# Patient Record
Sex: MALE | Race: OTHER | HISPANIC OR LATINO
[De-identification: names, ages, dates, MRNs, and addresses within clinical notes are randomized per-mention and may not be internally consistent; named-entity substitution may affect disease eponyms.]

---

## 2019-12-05 ENCOUNTER — TRANSCRIPTION ENCOUNTER (OUTPATIENT)
Age: 59
End: 2019-12-05

## 2019-12-16 DIAGNOSIS — Z84.1 FAMILY HISTORY OF DISORDERS OF KIDNEY AND URETER: ICD-10-CM

## 2019-12-16 DIAGNOSIS — Z82.49 FAMILY HISTORY OF ISCHEMIC HEART DISEASE AND OTHER DISEASES OF THE CIRCULATORY SYSTEM: ICD-10-CM

## 2019-12-16 DIAGNOSIS — Z83.3 FAMILY HISTORY OF DIABETES MELLITUS: ICD-10-CM

## 2019-12-24 ENCOUNTER — APPOINTMENT (OUTPATIENT)
Dept: GASTROENTEROLOGY | Facility: CLINIC | Age: 59
End: 2019-12-24
Payer: COMMERCIAL

## 2019-12-24 VITALS
HEIGHT: 65 IN | SYSTOLIC BLOOD PRESSURE: 148 MMHG | BODY MASS INDEX: 31.49 KG/M2 | WEIGHT: 189 LBS | HEART RATE: 71 BPM | DIASTOLIC BLOOD PRESSURE: 76 MMHG

## 2019-12-24 DIAGNOSIS — D50.9 IRON DEFICIENCY ANEMIA, UNSPECIFIED: ICD-10-CM

## 2019-12-24 DIAGNOSIS — I10 ESSENTIAL (PRIMARY) HYPERTENSION: ICD-10-CM

## 2019-12-24 DIAGNOSIS — Z79.4 TYPE 2 DIABETES MELLITUS W/OUT COMPLICATIONS: ICD-10-CM

## 2019-12-24 DIAGNOSIS — E11.9 TYPE 2 DIABETES MELLITUS W/OUT COMPLICATIONS: ICD-10-CM

## 2019-12-24 DIAGNOSIS — R14.3 FLATULENCE: ICD-10-CM

## 2019-12-24 DIAGNOSIS — E03.9 HYPOTHYROIDISM, UNSPECIFIED: ICD-10-CM

## 2019-12-24 DIAGNOSIS — E78.00 PURE HYPERCHOLESTEROLEMIA, UNSPECIFIED: ICD-10-CM

## 2019-12-24 PROCEDURE — 82274 ASSAY TEST FOR BLOOD FECAL: CPT | Mod: QW

## 2019-12-24 PROCEDURE — 99214 OFFICE O/P EST MOD 30 MIN: CPT

## 2019-12-24 NOTE — ASSESSMENT
[FreeTextEntry1] : 1.  Increased gas, bloating, belching and flatulence-rule out secondary to gastroparesis, GERD, gastritis, upper GI neoplasm.\par 2.  Upper endoscopy June 5, 2015 revealed intestinal metaplasia, absence of parietal cells suggesting autoimmune/atrophic chronic gastritis, focal intestinal metaplasia distal esophagus.\par 3.  Insulin-dependent diabetes mellitus with prior history of gastroparesis.\par 4.  Hypertension.\par 5.  Hypercholesterolemia.\par 6.  Hypothyroidism.\par 7.  History of iron deficiency anemia-etiology unclear.\par 8.  Status post abdominal surgery for obstructing food.\par 9.  History of colon polyp-colonoscopy June 5, 2015 revealed a tubular adenoma and internal hemorrhoids.\par \par Plan:\par 1.  The patient was advised to schedule an upper endoscopy.  The procedure, material risks, benefits and alternatives were discussed with the patient at length.  Brochure given.\par 2.  Nuclear gastric emptying study.\par 3.  A colonoscopy will be recommended for 2020, but if the patient would like to have the sooner this could be arranged.\par 4.  Investigation of the small bowel may be necessary as well.

## 2019-12-24 NOTE — REASON FOR VISIT
[Follow-Up: _____] : a [unfilled] follow-up visit [FreeTextEntry1] : Pt c/o gas and bloating x 6 months.  He tried Gas X, but only worked for a couple of days. Pt denies other GI sxs.

## 2019-12-24 NOTE — PHYSICAL EXAM
[General Appearance - Alert] : alert [General Appearance - In No Acute Distress] : in no acute distress [General Appearance - Well Nourished] : well nourished [General Appearance - Well Developed] : well developed [General Appearance - Well-Appearing] : healthy appearing [Extraocular Movements] : extraocular movements were intact [Sclera] : the sclera and conjunctiva were normal [PERRL With Normal Accommodation] : pupils were equal in size, round, and reactive to light [Strabismus] : no strabismus was seen [Outer Ear] : the ears and nose were normal in appearance [Both Tympanic Membranes Were Examined] : both tympanic membranes were normal [Examination Of The Oral Cavity] : the lips and gums were normal [Oropharynx] : the oropharynx was normal [Nasal Cavity] : the nasal mucosa and septum were normal [Neck Appearance] : the appearance of the neck was normal [Jugular Venous Distention Increased] : there was no jugular-venous distention [Neck Cervical Mass (___cm)] : no neck mass was observed [Thyroid Nodule] : there were no palpable thyroid nodules [Thyroid Diffuse Enlargement] : the thyroid was not enlarged [Auscultation Breath Sounds / Voice Sounds] : lungs were clear to auscultation bilaterally [Lungs Percussion] : the lungs were normal to percussion [Heart Rate And Rhythm] : heart rate was normal and rhythm regular [Heart Sounds] : normal S1 and S2 [Heart Sounds Gallop] : no gallops [Murmurs] : no murmurs [Heart Sounds Pericardial Friction Rub] : no pericardial rub [Arterial Pulses Carotid] : carotid pulses were normal with no bruits [Abdominal Aorta] : the abdominal aorta was normal [Full Pulse] : the pedal pulses are present [Veins - Varicosity Changes] : there were no varicosital changes [Edema] : there was no peripheral edema [Bowel Sounds] : normal bowel sounds [Abdomen Soft] : soft [Abdomen Tenderness] : non-tender [Abdomen Mass (___ Cm)] : no abdominal mass palpated [Normal Sphincter Tone] : normal sphincter tone [No Rectal Mass] : no rectal mass [FreeTextEntry1] : Stool brown, negative Hemoccult, negative iFOBT [Occult Blood Positive] : stool was negative for occult blood [Penis Abnormality] : the penis was normal [Testes Swelling] : the testicles were not swollen [Scrotum] : the scrotum was normal [Testes Mass (___cm)] : there were no testicular masses [Prostate Enlargement] : the prostate was not enlarged [Cervical Lymph Nodes Enlarged Posterior Bilaterally] : posterior cervical [Supraclavicular Lymph Nodes Enlarged Bilaterally] : supraclavicular [Cervical Lymph Nodes Enlarged Anterior Bilaterally] : anterior cervical [Axillary Lymph Nodes Enlarged Bilaterally] : axillary [Inguinal Lymph Nodes Enlarged Bilaterally] : inguinal [Femoral Lymph Nodes Enlarged Bilaterally] : femoral [No Spinal Tenderness] : no spinal tenderness [No CVA Tenderness] : no ~M costovertebral angle tenderness [Abnormal Walk] : normal gait [Nail Clubbing] : no clubbing  or cyanosis of the fingernails [Musculoskeletal - Swelling] : no joint swelling seen [Involuntary Movements] : no involuntary movements were seen [Motor Tone] : muscle strength and tone were normal [Skin Color & Pigmentation] : normal skin color and pigmentation [Skin Turgor] : normal skin turgor [] : no rash [Skin Lesions] : no skin lesions [Oriented To Time, Place, And Person] : oriented to person, place, and time [No Focal Deficits] : no focal deficits [Affect] : the affect was normal [Mood] : the mood was normal [Impaired Insight] : insight and judgment were intact

## 2019-12-24 NOTE — HISTORY OF PRESENT ILLNESS
[FreeTextEntry1] : For approximately 6 months, Redd has been complaining of excessive gas, bloating, burping and flatulence.  He takes Gas-X, and this helps him temporarily.  According to him, his diabetes is under control, and his last hemoglobin A1c = 6.8.  His endocrinologist is Dr. Ceja.  He has not been complaining of heartburn, nausea or vomiting.  Occasionally, he has diarrhea, but his bowel habits have not significantly changed, and he denies severe abdominal pain or blood in the stool.  He has not changed his medication or diet.  A year ago, he received iron infusions because of iron deficiency anemia.  According to him, his hemoglobin level is now okay.  In 2014, he had abdominal surgery because a piece of broccoli got stuck in his small intestine.

## 2019-12-24 NOTE — CONSULT LETTER
[Dear  ___] : Dear  [unfilled], [Consult Letter:] : I had the pleasure of evaluating your patient, [unfilled]. [( Thank you for referring [unfilled] for consultation for _____ )] : Thank you for referring [unfilled] for consultation for [unfilled] [Please see my note below.] : Please see my note below. [Consult Closing:] : Thank you very much for allowing me to participate in the care of this patient.  If you have any questions, please do not hesitate to contact me. [Sincerely,] : Sincerely, [FreeTextEntry3] : Hemant Vaughn MD [DrAnna  ___] : Dr. PACHECO

## 2019-12-31 ENCOUNTER — APPOINTMENT (OUTPATIENT)
Dept: GASTROENTEROLOGY | Facility: CLINIC | Age: 59
End: 2019-12-31

## 2020-01-07 ENCOUNTER — APPOINTMENT (OUTPATIENT)
Dept: GASTROENTEROLOGY | Facility: CLINIC | Age: 60
End: 2020-01-07

## 2020-02-26 ENCOUNTER — APPOINTMENT (OUTPATIENT)
Dept: GASTROENTEROLOGY | Facility: CLINIC | Age: 60
End: 2020-02-26
Payer: COMMERCIAL

## 2020-02-26 VITALS
SYSTOLIC BLOOD PRESSURE: 134 MMHG | HEART RATE: 79 BPM | DIASTOLIC BLOOD PRESSURE: 71 MMHG | BODY MASS INDEX: 31.65 KG/M2 | HEIGHT: 65 IN | WEIGHT: 190 LBS

## 2020-02-26 DIAGNOSIS — R14.2 ERUCTATION: ICD-10-CM

## 2020-02-26 PROCEDURE — 99214 OFFICE O/P EST MOD 30 MIN: CPT

## 2020-02-26 NOTE — ASSESSMENT
[FreeTextEntry1] : 1.  Nausea, vomiting, bloating, belching and flatulence.  Differential diagnosis includes gastroparesis, gastric outlet obstruction, intermittent partial bowel obstruction, gastric neoplasm.\par 2.  Autoimmune/atrophic gastritis with intestinal metaplasia seen on EGD in June 2015.\par 3.  Tubular adenoma and hemorrhoids on colonoscopy in June 2015- rule out metachronous lesion.\par 4.  Insulin-dependent diabetes mellitus with prior history of gastroparesis.\par 5.  Hypertension.\par 6.  Hypercholesterolemia.\par 7.  Hypothyroidism.\par 8.  History of iron deficiency anemia-etiology unclear.\par 9.  Status post abdominal surgery for obstructing food.\par \par Plan:\par - Patient was advised to undergo endoscopy and colonoscopy- procedure, risks, benefits, and alternatives were explained. Patient agreeable. Brochure given. Miralax prep.  Patient was advised to speak with prescribing MD about insulin dosing before the procedure.  If patient vomits with the preparation, only EGD will be performed.\par - Patient was advised to remain on liquid diet when symptomatic.\par - NM Gastric emptying study, possible CT enterography pending results of above.

## 2020-02-26 NOTE — PHYSICAL EXAM
[General Appearance - Alert] : alert [General Appearance - In No Acute Distress] : in no acute distress [Sclera] : the sclera and conjunctiva were normal [General Appearance - Well Nourished] : well nourished [PERRL With Normal Accommodation] : pupils were equal in size, round, and reactive to light [Extraocular Movements] : extraocular movements were intact [Outer Ear] : the ears and nose were normal in appearance [Hearing Threshold Finger Rub Not [place]] : hearing was normal [Both Tympanic Membranes Were Examined] : both tympanic membranes were normal [Neck Appearance] : the appearance of the neck was normal [Neck Cervical Mass (___cm)] : no neck mass was observed [Heart Rate And Rhythm] : heart rate was normal and rhythm regular [Auscultation Breath Sounds / Voice Sounds] : lungs were clear to auscultation bilaterally [Edema] : there was no peripheral edema [Heart Sounds] : normal S1 and S2 [Bowel Sounds] : normal bowel sounds [Abdomen Soft] : soft [Abdomen Tenderness] : non-tender [] : no hepato-splenomegaly [Abdomen Hernia] : no hernia was discovered [Abdomen Mass (___ Cm)] : no abdominal mass palpated [FreeTextEntry1] : umbilical scar [Supraclavicular Lymph Nodes Enlarged Bilaterally] : supraclavicular [Cervical Lymph Nodes Enlarged Anterior Bilaterally] : anterior cervical [No CVA Tenderness] : no ~M costovertebral angle tenderness [Abnormal Walk] : normal gait [Skin Color & Pigmentation] : normal skin color and pigmentation [Skin Turgor] : normal skin turgor [No Focal Deficits] : no focal deficits [Oriented To Time, Place, And Person] : oriented to person, place, and time [Impaired Insight] : insight and judgment were intact

## 2020-02-26 NOTE — HISTORY OF PRESENT ILLNESS
[Heartburn] : denies heartburn [Vomiting] : stable vomiting [Nausea] : stable nausea [Diarrhea] : denies diarrhea [Constipation] : denies constipation [Yellow Skin Or Eyes (Jaundice)] : denies jaundice [Rectal Pain] : denies rectal pain [Abdominal Pain] : stable abdominal pain [Abdominal Swelling] : abdominal swelling stable [de-identified] : Redd presents to the office today for follow up with complaints of bloating, nausea, vomiting, and diarrhea.  He was last seen by Dr. Vaughn in December 2019 for similar symptoms.\par \par For almost the past year, he has intermittent episodes of bloating and abdominal distention followed by nausea with emesis of food and nonbloody diarrhea.  The symptoms can last for about 24 hours.  It has occurred intermittently every 3-4 months.  He feels well between episodes.  He denies any dysphagia, significant abdominal pain, change in bowel habits otherwise, or weight loss.\par \par PMH: He has type 1 DM for 30 years which is well controlled.  He has been diagnosed with gastroparesis in the past.  He had intestinal surgery in 2014 for a blockage caused by broccoli.  His last EGD in June 2015 revealed granulation tissue at the pylorus.  Biopsies revealed atrophic gastritis with intestinal metaplasia and granulation tissue in the bulb.  On his colonoscopy in June 2015, a small tubular adenoma was removed from the ascending colon. [_________] : Performed [unfilled] [de-identified] : atrophic gastritis with IM, granulation tissue [de-identified] : tubular adenoma

## 2020-03-02 ENCOUNTER — LABORATORY RESULT (OUTPATIENT)
Age: 60
End: 2020-03-02

## 2020-03-03 ENCOUNTER — APPOINTMENT (OUTPATIENT)
Dept: GASTROENTEROLOGY | Facility: CLINIC | Age: 60
End: 2020-03-03
Payer: COMMERCIAL

## 2020-03-03 PROCEDURE — 43239 EGD BIOPSY SINGLE/MULTIPLE: CPT | Mod: 59

## 2020-03-03 PROCEDURE — 45380 COLONOSCOPY AND BIOPSY: CPT | Mod: 33

## 2020-07-29 ENCOUNTER — EMERGENCY (EMERGENCY)
Facility: HOSPITAL | Age: 60
LOS: 1 days | Discharge: HOME | End: 2020-07-29
Attending: EMERGENCY MEDICINE | Admitting: EMERGENCY MEDICINE
Payer: COMMERCIAL

## 2020-07-29 ENCOUNTER — APPOINTMENT (OUTPATIENT)
Dept: GASTROENTEROLOGY | Facility: CLINIC | Age: 60
End: 2020-07-29
Payer: COMMERCIAL

## 2020-07-29 VITALS
SYSTOLIC BLOOD PRESSURE: 152 MMHG | TEMPERATURE: 98 F | RESPIRATION RATE: 16 BRPM | OXYGEN SATURATION: 100 % | DIASTOLIC BLOOD PRESSURE: 55 MMHG | HEART RATE: 66 BPM

## 2020-07-29 VITALS
HEIGHT: 65 IN | HEART RATE: 66 BPM | SYSTOLIC BLOOD PRESSURE: 132 MMHG | TEMPERATURE: 97.7 F | BODY MASS INDEX: 31.65 KG/M2 | WEIGHT: 190 LBS | DIASTOLIC BLOOD PRESSURE: 71 MMHG

## 2020-07-29 VITALS
RESPIRATION RATE: 18 BRPM | DIASTOLIC BLOOD PRESSURE: 80 MMHG | HEART RATE: 65 BPM | SYSTOLIC BLOOD PRESSURE: 160 MMHG | OXYGEN SATURATION: 100 %

## 2020-07-29 DIAGNOSIS — K31.9 DISEASE OF STOMACH AND DUODENUM, UNSPECIFIED: Chronic | ICD-10-CM

## 2020-07-29 DIAGNOSIS — R11.2 NAUSEA WITH VOMITING, UNSPECIFIED: ICD-10-CM

## 2020-07-29 LAB
ALBUMIN SERPL ELPH-MCNC: 3.8 G/DL — SIGNIFICANT CHANGE UP (ref 3.3–5)
ALBUMIN SERPL ELPH-MCNC: 4.3 G/DL — SIGNIFICANT CHANGE UP (ref 3.3–5)
ALP SERPL-CCNC: 86 U/L — SIGNIFICANT CHANGE UP (ref 40–120)
ALP SERPL-CCNC: 87 U/L — SIGNIFICANT CHANGE UP (ref 40–120)
ALT FLD-CCNC: 28 U/L — SIGNIFICANT CHANGE UP (ref 4–41)
ALT FLD-CCNC: 30 U/L — SIGNIFICANT CHANGE UP (ref 4–41)
ANION GAP SERPL CALC-SCNC: 14 MMO/L — SIGNIFICANT CHANGE UP (ref 7–14)
ANION GAP SERPL CALC-SCNC: 18 MMO/L — HIGH (ref 7–14)
AST SERPL-CCNC: 16 U/L — SIGNIFICANT CHANGE UP (ref 4–40)
AST SERPL-CCNC: 27 U/L — SIGNIFICANT CHANGE UP (ref 4–40)
BASOPHILS # BLD AUTO: 0.03 K/UL — SIGNIFICANT CHANGE UP (ref 0–0.2)
BASOPHILS NFR BLD AUTO: 0.3 % — SIGNIFICANT CHANGE UP (ref 0–2)
BILIRUB SERPL-MCNC: 0.5 MG/DL — SIGNIFICANT CHANGE UP (ref 0.2–1.2)
BILIRUB SERPL-MCNC: 0.6 MG/DL — SIGNIFICANT CHANGE UP (ref 0.2–1.2)
BUN SERPL-MCNC: 25 MG/DL — HIGH (ref 7–23)
BUN SERPL-MCNC: 26 MG/DL — HIGH (ref 7–23)
CALCIUM SERPL-MCNC: 9 MG/DL — SIGNIFICANT CHANGE UP (ref 8.4–10.5)
CALCIUM SERPL-MCNC: 9.2 MG/DL — SIGNIFICANT CHANGE UP (ref 8.4–10.5)
CHLORIDE SERPL-SCNC: 101 MMOL/L — SIGNIFICANT CHANGE UP (ref 98–107)
CHLORIDE SERPL-SCNC: 104 MMOL/L — SIGNIFICANT CHANGE UP (ref 98–107)
CO2 SERPL-SCNC: 14 MMOL/L — LOW (ref 22–31)
CO2 SERPL-SCNC: 22 MMOL/L — SIGNIFICANT CHANGE UP (ref 22–31)
CREAT SERPL-MCNC: 1 MG/DL — SIGNIFICANT CHANGE UP (ref 0.5–1.3)
CREAT SERPL-MCNC: 1.08 MG/DL — SIGNIFICANT CHANGE UP (ref 0.5–1.3)
EOSINOPHIL # BLD AUTO: 0.12 K/UL — SIGNIFICANT CHANGE UP (ref 0–0.5)
EOSINOPHIL NFR BLD AUTO: 1.2 % — SIGNIFICANT CHANGE UP (ref 0–6)
GLUCOSE SERPL-MCNC: 172 MG/DL — HIGH (ref 70–99)
GLUCOSE SERPL-MCNC: 207 MG/DL — HIGH (ref 70–99)
HCT VFR BLD CALC: 46.6 % — SIGNIFICANT CHANGE UP (ref 39–50)
HGB BLD-MCNC: 14.5 G/DL — SIGNIFICANT CHANGE UP (ref 13–17)
IMM GRANULOCYTES NFR BLD AUTO: 0.9 % — SIGNIFICANT CHANGE UP (ref 0–1.5)
LIDOCAIN IGE QN: SIGNIFICANT CHANGE UP U/L (ref 7–60)
LYMPHOCYTES # BLD AUTO: 1.78 K/UL — SIGNIFICANT CHANGE UP (ref 1–3.3)
LYMPHOCYTES # BLD AUTO: 17.9 % — SIGNIFICANT CHANGE UP (ref 13–44)
MCHC RBC-ENTMCNC: 26.9 PG — LOW (ref 27–34)
MCHC RBC-ENTMCNC: 31.1 % — LOW (ref 32–36)
MCV RBC AUTO: 86.3 FL — SIGNIFICANT CHANGE UP (ref 80–100)
MONOCYTES # BLD AUTO: 1.12 K/UL — HIGH (ref 0–0.9)
MONOCYTES NFR BLD AUTO: 11.3 % — SIGNIFICANT CHANGE UP (ref 2–14)
NEUTROPHILS # BLD AUTO: 6.81 K/UL — SIGNIFICANT CHANGE UP (ref 1.8–7.4)
NEUTROPHILS NFR BLD AUTO: 68.4 % — SIGNIFICANT CHANGE UP (ref 43–77)
NRBC # FLD: 0 K/UL — SIGNIFICANT CHANGE UP (ref 0–0)
PLATELET # BLD AUTO: 277 K/UL — SIGNIFICANT CHANGE UP (ref 150–400)
PMV BLD: 10.3 FL — SIGNIFICANT CHANGE UP (ref 7–13)
POTASSIUM SERPL-MCNC: 3.7 MMOL/L — SIGNIFICANT CHANGE UP (ref 3.5–5.3)
POTASSIUM SERPL-MCNC: 5.1 MMOL/L — SIGNIFICANT CHANGE UP (ref 3.5–5.3)
POTASSIUM SERPL-SCNC: 3.7 MMOL/L — SIGNIFICANT CHANGE UP (ref 3.5–5.3)
POTASSIUM SERPL-SCNC: 5.1 MMOL/L — SIGNIFICANT CHANGE UP (ref 3.5–5.3)
PROT SERPL-MCNC: 7.1 G/DL — SIGNIFICANT CHANGE UP (ref 6–8.3)
PROT SERPL-MCNC: 7.2 G/DL — SIGNIFICANT CHANGE UP (ref 6–8.3)
RBC # BLD: 5.4 M/UL — SIGNIFICANT CHANGE UP (ref 4.2–5.8)
RBC # FLD: 13.7 % — SIGNIFICANT CHANGE UP (ref 10.3–14.5)
SODIUM SERPL-SCNC: 136 MMOL/L — SIGNIFICANT CHANGE UP (ref 135–145)
SODIUM SERPL-SCNC: 137 MMOL/L — SIGNIFICANT CHANGE UP (ref 135–145)
WBC # BLD: 9.95 K/UL — SIGNIFICANT CHANGE UP (ref 3.8–10.5)
WBC # FLD AUTO: 9.95 K/UL — SIGNIFICANT CHANGE UP (ref 3.8–10.5)

## 2020-07-29 PROCEDURE — 74177 CT ABD & PELVIS W/CONTRAST: CPT | Mod: 26

## 2020-07-29 PROCEDURE — 99284 EMERGENCY DEPT VISIT MOD MDM: CPT

## 2020-07-29 PROCEDURE — 99214 OFFICE O/P EST MOD 30 MIN: CPT

## 2020-07-29 RX ORDER — METOCLOPRAMIDE HCL 10 MG
10 TABLET ORAL ONCE
Refills: 0 | Status: COMPLETED | OUTPATIENT
Start: 2020-07-29 | End: 2020-07-29

## 2020-07-29 RX ORDER — SODIUM CHLORIDE 9 MG/ML
1000 INJECTION INTRAMUSCULAR; INTRAVENOUS; SUBCUTANEOUS ONCE
Refills: 0 | Status: COMPLETED | OUTPATIENT
Start: 2020-07-29 | End: 2020-07-29

## 2020-07-29 RX ORDER — ONDANSETRON 8 MG/1
4 TABLET, FILM COATED ORAL ONCE
Refills: 0 | Status: COMPLETED | OUTPATIENT
Start: 2020-07-29 | End: 2020-07-29

## 2020-07-29 RX ORDER — MORPHINE SULFATE 50 MG/1
4 CAPSULE, EXTENDED RELEASE ORAL ONCE
Refills: 0 | Status: DISCONTINUED | OUTPATIENT
Start: 2020-07-29 | End: 2020-07-29

## 2020-07-29 RX ADMIN — SODIUM CHLORIDE 1000 MILLILITER(S): 9 INJECTION INTRAMUSCULAR; INTRAVENOUS; SUBCUTANEOUS at 17:00

## 2020-07-29 RX ADMIN — ONDANSETRON 4 MILLIGRAM(S): 8 TABLET, FILM COATED ORAL at 19:20

## 2020-07-29 RX ADMIN — Medication 10 MILLIGRAM(S): at 16:00

## 2020-07-29 RX ADMIN — MORPHINE SULFATE 4 MILLIGRAM(S): 50 CAPSULE, EXTENDED RELEASE ORAL at 17:00

## 2020-07-29 NOTE — ED PROVIDER NOTE - PROGRESS NOTE DETAILS
Carter: Pt feeling much better. Understands need to wait for CT scan for more definitive diagnostics. Carter: Pt signed out to PA as awaiting CT scan and lab results for DC planning Pt feeling better. Abd pain has resolved. Abd soft non tender. Pt tolerating PO. Labs unremarkable. Imaging negative. Advised to follow up with his GI doctor within the next 1-2 days. If any worsening symptoms return to Emergency Department immediately. Pt verbalizes agreement and understanding. VSS.

## 2020-07-29 NOTE — ED ADULT NURSE NOTE - OBJECTIVE STATEMENT
pt with pmh of DM reports 3 days of NVD unable to keep PO intake down. saw his PMD who sent to GI. GI sent to ED for CT to r/o SBO. abd soft with cramping, denies chest pain or SOB. denies blood stool. not currently vomiting

## 2020-07-29 NOTE — REASON FOR VISIT
[Follow-Up: _____] : a [unfilled] follow-up visit [FreeTextEntry1] : Stomach cramps, vomiting, and diarrhea

## 2020-07-29 NOTE — ED PROVIDER NOTE - OBJECTIVE STATEMENT
59 y/o M hx IDDM and HTN with 3 days of abdominal pain as well as non-bloody vomiting, and diarrhea. Was seen by GI today and sent in for concern of obstruction. Denies f/c, CP, SOB, syncope, dysuria, hematuria, difficulty ambulating.     PSH: ?foreign body removal from stomach 59 y/o M hx IDDM and HTN with 3 days of abdominal pain as well as non-bloody vomiting, and diarrhea. Was seen by GI today and sent in for concern of obstruction. Denies f/c, CP, SOB, syncope, dysuria, hematuria, difficulty ambulating.     PSH: ?foreign body removal from stomach    Attendinyo male presents with vomiting and nausea.  also with epigastric pain.  no fever.

## 2020-07-29 NOTE — ED PROVIDER NOTE - NS ED ROS FT
Constitutional: (-) fever (+) vomiting  Eyes/ENT: (-) vision changes, (-) hearing changes  Cardiovascular: (-) chest pain, (-) wheezing  Respiratory: (-) cough, (-) shortness of breath  Gastrointestinal: (+) vomiting, (+) diarrhea, (+) abdominal pain  : (-) dysuria   Musculoskeletal: (-) back pain  Integumentary: (-) rash, (-) edema  Neurological: (-)loc  Allergic/Immunologic: (-) pruritus  Endocrine: No history of thyroid disease
0

## 2020-07-29 NOTE — HISTORY OF PRESENT ILLNESS
[Nausea] : stable nausea [Vomiting] : stable vomiting [Heartburn] : denies heartburn [Constipation] : denies constipation [Diarrhea] : denies diarrhea [Yellow Skin Or Eyes (Jaundice)] : denies jaundice [Abdominal Swelling] : abdominal swelling stable [Rectal Pain] : denies rectal pain [Abdominal Pain] : stable abdominal pain [_________] : Performed [unfilled] [de-identified] : atrophic gastritis with IM, ulcerated hyperplastic polyp [de-identified] : tubular adenoma [de-identified] : Redd presents to the office today for follow up with complaints of bloating, nausea, vomiting, and diarrhea.  He was last seen in the office in March 2020 when he underwent an EGD and colonoscopy.\par \par For the past year, he has intermittent episodes of bloating and abdominal distention followed by nausea with emesis of food and nonbloody diarrhea.  The symptoms seem to be occurring with increasing frequency.  The current episode started with bloating last week.  Two days ago, he started having nausea with persistent vomiting.  His voice has become hoarse from vomiting and he saw specs of blood with the emesis.  He has not been able to keep fluids down.  He went to the [facility] ER 2 days ago and reports that he was discharged on Pepcid and Maalox.  He started having diarrhea today.  He is concerned that his blood sugars may drop if he cannot keep anything down.  On his EGD in March 2020, an ulcerated hyperplastic polyp was seen in the antrum.  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  On his colonoscopy, 2 small tubular adenomas were removed and biopsies were negative for microscopic colitis.\par \par PMH: He has type 1 DM for 30 years which is well controlled.  He has been diagnosed with gastroparesis in the past.  He had intestinal surgery in 2014 for a blockage caused by broccoli.  An EGD in June 2015 revealed atrophic gastritis with intestinal metaplasia and granulation tissue in the bulb.  On his colonoscopy in June 2015, a small tubular adenoma was removed.

## 2020-07-29 NOTE — ED PROVIDER NOTE - PHYSICAL EXAMINATION
Vitals: I have reviewed the patients vital signs  General: well appearing, well nourished, no acute distress  HEENT: atraumatic, normocephalic, airway patent, EOMI and appropriate tracking  Neck: no JVD, no tracheal deviation, no goiter  Chest/Lungs: no trauma, symmetric chest rise, speaking in complete sentences  Heart: Regular rate, regular rhythm, skin well perfused  Abdomen: Obese, Soft, distended, tender in mid epigastrum and right side. No rebound or involuntary guarding. No bruising. No easily palpable masses.  Neuro: A+Ox3,ambulating without difficulty, non dysarthric speech  Eyes: PERRL, EOMI, no conjunctival injection  MSK: all limbs at baseline strength, no wasting or atrophy,   Skin: no bleeding, no cyanosis, no jaundice, no new emergent lesions

## 2020-07-29 NOTE — ED PROVIDER NOTE - NSFOLLOWUPINSTRUCTIONS_ED_ALL_ED_FT
Follow up with your primary care physician in 48-72 hours- bring copies of your results.  Return to the ER for worsening or persistent symptoms, including but not limited to worsening/persistent pain, shortness of breath, fevers, vomiting, lightheadedness, passing out and/or ANY NEW OR CONCERNING SYMPTOMS. If you have issues obtaining follow up, please call: [phone] to obtain a doctor or specialist who takes your insurance in your area.  You may call [phone] to make an appointment with the internal medicine clinic.

## 2020-07-29 NOTE — ASSESSMENT
[FreeTextEntry1] : 1.  Nausea, vomiting, bloating, belching and flatulence.  Likely secondary to gastroparesis.  Intermittent gastric outlet obstruction from hyperplastic antral polyp also possible.\par 2.  Autoimmune/atrophic gastritis with intestinal metaplasia seen on EGD in March 2020 and June 2015.\par 3.  Tubular adenoma and hemorrhoids on colonoscopy in March 2020 and June 2015.\par 4.  Status post abdominal surgery for obstructing food.\par 5.  Insulin-dependent diabetes mellitus with prior history of gastroparesis.\par 6.  Hypertension.\par 7.  Hypercholesterolemia.\par 8.  Hypothyroidism.\par 9.  History of iron deficiency anemia-etiology unclear.\par \par Plan:\par - Prior EGD reviewed.\par - Patient was advised to begin metoclopramide 10 mg ID and pantoprazole 20 mg daily.\par - Clear liquid diet until symptoms improve.\par - Check NM Gastric emptying study in 1-2 months.\par - If gastric emptying study negative, would consider repeat EGD for gastric polypectomy.\par - Patient was advised to go to ER if he is not able to tolerate liquids.  Given concern for hypoglycemia and inability to tolerate PO, patient reported that he will ask his daughter to take him to the [facility] ER after leaving the office.

## 2020-07-29 NOTE — PHYSICAL EXAM
[General Appearance - In No Acute Distress] : in no acute distress [General Appearance - Alert] : alert [General Appearance - Well Nourished] : well nourished [PERRL With Normal Accommodation] : pupils were equal in size, round, and reactive to light [Sclera] : the sclera and conjunctiva were normal [Outer Ear] : the ears and nose were normal in appearance [Hearing Threshold Finger Rub Not [place]] : hearing was normal [Extraocular Movements] : extraocular movements were intact [Both Tympanic Membranes Were Examined] : both tympanic membranes were normal [Neck Appearance] : the appearance of the neck was normal [Neck Cervical Mass (___cm)] : no neck mass was observed [Auscultation Breath Sounds / Voice Sounds] : lungs were clear to auscultation bilaterally [Edema] : there was no peripheral edema [Heart Sounds] : normal S1 and S2 [Heart Rate And Rhythm] : heart rate was normal and rhythm regular [Abdomen Soft] : soft [Bowel Sounds] : normal bowel sounds [Abdomen Tenderness] : non-tender [Abdomen Mass (___ Cm)] : no abdominal mass palpated [] : no hepato-splenomegaly [Abdomen Hernia] : no hernia was discovered [Cervical Lymph Nodes Enlarged Anterior Bilaterally] : anterior cervical [FreeTextEntry1] : umbilical scar [Abnormal Walk] : normal gait [Supraclavicular Lymph Nodes Enlarged Bilaterally] : supraclavicular [No CVA Tenderness] : no ~M costovertebral angle tenderness [No Focal Deficits] : no focal deficits [Skin Turgor] : normal skin turgor [Skin Color & Pigmentation] : normal skin color and pigmentation [Oriented To Time, Place, And Person] : oriented to person, place, and time [Impaired Insight] : insight and judgment were intact

## 2020-07-29 NOTE — ED PROVIDER NOTE - HIV OFFER
Patient ambulates to room D from triage with a chief complaint of left Great toe joint pain on-going for several weeks. Pt states another resident had a foot x-ray \"just like I had\"/states \"they missed the fracture and I think that's why I still have pain\". Patient is in no distress. Stable with ambulation.
Opt out

## 2020-07-29 NOTE — ED PROVIDER NOTE - CLINICAL SUMMARY MEDICAL DECISION MAKING FREE TEXT BOX
Abdominal pain, bloating, vomiting.  will get CT to evaluate for bowel obstruction.  treat symptomatically and reassess.

## 2020-07-29 NOTE — ED PROVIDER NOTE - PATIENT PORTAL LINK FT
You can access the FollowMyHealth Patient Portal offered by [facility] by registering at the following website: [email or web address]. By joining [employer]’s FollowMyHealth portal, you will also be able to view your health information using other applications (apps) compatible with our system.

## 2020-07-29 NOTE — REVIEW OF SYSTEMS
[Abdominal Pain] : abdominal pain [Vomiting] : vomiting [Diarrhea] : diarrhea [Negative] : Heme/Lymph [As Noted in HPI] : as noted in HPI

## 2020-08-06 PROBLEM — I10 ESSENTIAL (PRIMARY) HYPERTENSION: Chronic | Status: ACTIVE | Noted: 2020-07-29

## 2020-08-17 ENCOUNTER — APPOINTMENT (OUTPATIENT)
Dept: NUCLEAR MEDICINE | Facility: HOSPITAL | Age: 60
End: 2020-08-17
Payer: COMMERCIAL

## 2020-08-17 ENCOUNTER — RESULT REVIEW (OUTPATIENT)
Age: 60
End: 2020-08-17

## 2020-08-17 ENCOUNTER — OUTPATIENT (OUTPATIENT)
Dept: OUTPATIENT SERVICES | Facility: HOSPITAL | Age: 60
LOS: 1 days | End: 2020-08-17

## 2020-08-17 DIAGNOSIS — K31.9 DISEASE OF STOMACH AND DUODENUM, UNSPECIFIED: Chronic | ICD-10-CM

## 2020-08-17 DIAGNOSIS — R11.2 NAUSEA WITH VOMITING, UNSPECIFIED: ICD-10-CM

## 2020-08-17 PROCEDURE — 78264 GASTRIC EMPTYING IMG STUDY: CPT | Mod: 26

## 2020-12-08 ENCOUNTER — APPOINTMENT (OUTPATIENT)
Dept: GASTROENTEROLOGY | Facility: CLINIC | Age: 60
End: 2020-12-08
Payer: COMMERCIAL

## 2020-12-08 VITALS
BODY MASS INDEX: 29.16 KG/M2 | WEIGHT: 175 LBS | HEART RATE: 60 BPM | TEMPERATURE: 97.5 F | DIASTOLIC BLOOD PRESSURE: 65 MMHG | SYSTOLIC BLOOD PRESSURE: 149 MMHG | HEIGHT: 65 IN

## 2020-12-08 DIAGNOSIS — K63.89 OTHER SPECIFIED DISEASES OF INTESTINE: ICD-10-CM

## 2020-12-08 PROCEDURE — 99214 OFFICE O/P EST MOD 30 MIN: CPT

## 2020-12-08 PROCEDURE — 99072 ADDL SUPL MATRL&STAF TM PHE: CPT

## 2020-12-08 NOTE — ASSESSMENT
[FreeTextEntry1] : 1.  Episodic nausea, bloating, diarrhea.  CT Abd/Pel in July 2020 unremarkable.  NM Gastric Emptying Study in August 2020 normal.  Differential includes SIBO, diabetic enteropathy, functional GI disorder, intermittent gastric outlet obstruction from hyperplastic antral polyp.\par 2.  Autoimmune/atrophic gastritis with intestinal metaplasia seen on EGD in March 2020 and June 2015.\par 3.  Tubular adenoma and hemorrhoids on colonoscopy in March 2020 and June 2015.\par 4.  Status post abdominal surgery (small bowel) for obstructing food.\par 5.  Insulin-dependent diabetes mellitus with prior history of gastroparesis.\par 6.  Hypertension.\par 7.  Hypercholesterolemia.\par 8.  Hypothyroidism.\par 9.  History of iron deficiency anemia-etiology unclear.\par \par Plan:\par - CT images and NM Gastric emptying study reviewed.\par - Trial of Xifaxin for possible SIBO.\par - Decrease pantoprazole to 20 mg every other day.\par - Consider repeat EGD in the hospital setting late next Spring or summer for surveillance and removal of gastric polyp.

## 2020-12-08 NOTE — PHYSICAL EXAM
[General Appearance - Alert] : alert [General Appearance - In No Acute Distress] : in no acute distress [General Appearance - Well Nourished] : well nourished [Sclera] : the sclera and conjunctiva were normal [PERRL With Normal Accommodation] : pupils were equal in size, round, and reactive to light [Extraocular Movements] : extraocular movements were intact [Outer Ear] : the ears and nose were normal in appearance [Hearing Threshold Finger Rub Not [place]] : hearing was normal [Both Tympanic Membranes Were Examined] : both tympanic membranes were normal [Neck Appearance] : the appearance of the neck was normal [Neck Cervical Mass (___cm)] : no neck mass was observed [Auscultation Breath Sounds / Voice Sounds] : lungs were clear to auscultation bilaterally [Heart Rate And Rhythm] : heart rate was normal and rhythm regular [Heart Sounds] : normal S1 and S2 [Edema] : there was no peripheral edema [Bowel Sounds] : normal bowel sounds [Abdomen Soft] : soft [Abdomen Tenderness] : non-tender [] : no hepato-splenomegaly [Abdomen Mass (___ Cm)] : no abdominal mass palpated [Abdomen Hernia] : no hernia was discovered [Cervical Lymph Nodes Enlarged Anterior Bilaterally] : anterior cervical [Supraclavicular Lymph Nodes Enlarged Bilaterally] : supraclavicular [No CVA Tenderness] : no ~M costovertebral angle tenderness [Abnormal Walk] : normal gait [Skin Color & Pigmentation] : normal skin color and pigmentation [Skin Turgor] : normal skin turgor [No Focal Deficits] : no focal deficits [Oriented To Time, Place, And Person] : oriented to person, place, and time [Impaired Insight] : insight and judgment were intact [FreeTextEntry1] : umbilical scar

## 2020-12-08 NOTE — HISTORY OF PRESENT ILLNESS
[Heartburn] : denies heartburn [Nausea] : stable nausea [Vomiting] : stable vomiting [Diarrhea] : denies diarrhea [Constipation] : denies constipation [Yellow Skin Or Eyes (Jaundice)] : denies jaundice [Abdominal Pain] : stable abdominal pain [Abdominal Swelling] : abdominal swelling stable [Rectal Pain] : denies rectal pain [Test: ___] : [unfilled] [_________] : Performed [unfilled] [de-identified] : Redd presents to the office today for follow up with complaints of bloating and diarrhea.  He was last seen in the office in July 2020 for similar symptoms.\par \par For the past year, he has intermittent episodes of bloating and abdominal distention followed by nausea and nonbloody diarrhea.  Since his last visit, symptoms have occurred about twice a month.  He usually wakes up feeling bloated, takes Gas-X and starts to feel better by around 1 PM.  He currently feels well.  On the day of his last visit he went to the [facility] ER where he underwent a CT A/P which showed no acute pathology.  He then underwent a NM Gastric Emptying study in August 2020 which was normal.\par \par PMH: He has type 1 DM for 30 years which is well controlled.  He has been diagnosed with gastroparesis in the past.  He had intestinal surgery in 2014 for a blockage caused by broccoli.  An EGD in June 2015 revealed atrophic gastritis with intestinal metaplasia and granulation tissue in the bulb.  On his colonoscopy in June 2015, a small tubular adenoma was removed.  On his EGD in March 2020, an ulcerated hyperplastic polyp was seen in the antrum.  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  On his colonoscopy, 2 small tubular adenomas were removed and biopsies were negative for microscopic colitis. [de-identified] : no acute pathology [de-identified] : atrophic gastritis with IM, ulcerated hyperplastic polyp [de-identified] : tubular adenoma [de-identified] : normal

## 2020-12-31 PROBLEM — K63.89 SMALL INTESTINAL BACTERIAL OVERGROWTH: Status: ACTIVE | Noted: 2020-12-08

## 2021-02-22 ENCOUNTER — EMERGENCY (EMERGENCY)
Facility: HOSPITAL | Age: 61
LOS: 1 days | Discharge: HOME | End: 2021-02-22
Attending: EMERGENCY MEDICINE
Payer: COMMERCIAL

## 2021-02-22 ENCOUNTER — TRANSCRIPTION ENCOUNTER (OUTPATIENT)
Age: 61
End: 2021-02-22

## 2021-02-22 VITALS
OXYGEN SATURATION: 99 % | SYSTOLIC BLOOD PRESSURE: 183 MMHG | TEMPERATURE: 98 F | WEIGHT: 179.9 LBS | DIASTOLIC BLOOD PRESSURE: 78 MMHG | HEART RATE: 76 BPM | HEIGHT: 65 IN | RESPIRATION RATE: 16 BRPM

## 2021-02-22 DIAGNOSIS — K31.9 DISEASE OF STOMACH AND DUODENUM, UNSPECIFIED: Chronic | ICD-10-CM

## 2021-02-22 LAB
ALBUMIN SERPL ELPH-MCNC: 3.8 G/DL — SIGNIFICANT CHANGE UP (ref 3.3–5.2)
ALBUMIN SERPL ELPH-MCNC: 3.9 G/DL — SIGNIFICANT CHANGE UP (ref 3.3–5.2)
ALP SERPL-CCNC: 84 U/L — SIGNIFICANT CHANGE UP (ref 40–120)
ALP SERPL-CCNC: 88 U/L — SIGNIFICANT CHANGE UP (ref 40–120)
ALT FLD-CCNC: 23 U/L — SIGNIFICANT CHANGE UP
ALT FLD-CCNC: 23 U/L — SIGNIFICANT CHANGE UP
ANION GAP SERPL CALC-SCNC: 11 MMOL/L — SIGNIFICANT CHANGE UP (ref 5–17)
ANION GAP SERPL CALC-SCNC: 9 MMOL/L — SIGNIFICANT CHANGE UP (ref 5–17)
APTT BLD: 29.7 SEC — SIGNIFICANT CHANGE UP (ref 27.5–35.5)
AST SERPL-CCNC: 21 U/L — SIGNIFICANT CHANGE UP
AST SERPL-CCNC: 23 U/L — SIGNIFICANT CHANGE UP
BASOPHILS # BLD AUTO: 0.04 K/UL — SIGNIFICANT CHANGE UP (ref 0–0.2)
BASOPHILS NFR BLD AUTO: 0.5 % — SIGNIFICANT CHANGE UP (ref 0–2)
BILIRUB SERPL-MCNC: 0.2 MG/DL — LOW (ref 0.4–2)
BILIRUB SERPL-MCNC: <0.2 MG/DL — LOW (ref 0.4–2)
BUN SERPL-MCNC: 13 MG/DL — SIGNIFICANT CHANGE UP (ref 8–20)
BUN SERPL-MCNC: 14 MG/DL — SIGNIFICANT CHANGE UP (ref 8–20)
CALCIUM SERPL-MCNC: 9.1 MG/DL — SIGNIFICANT CHANGE UP (ref 8.6–10.2)
CALCIUM SERPL-MCNC: 9.1 MG/DL — SIGNIFICANT CHANGE UP (ref 8.6–10.2)
CHLORIDE SERPL-SCNC: 103 MMOL/L — SIGNIFICANT CHANGE UP (ref 98–107)
CHLORIDE SERPL-SCNC: 103 MMOL/L — SIGNIFICANT CHANGE UP (ref 98–107)
CO2 SERPL-SCNC: 26 MMOL/L — SIGNIFICANT CHANGE UP (ref 22–29)
CO2 SERPL-SCNC: 27 MMOL/L — SIGNIFICANT CHANGE UP (ref 22–29)
CREAT SERPL-MCNC: 0.89 MG/DL — SIGNIFICANT CHANGE UP (ref 0.5–1.3)
CREAT SERPL-MCNC: 1.11 MG/DL — SIGNIFICANT CHANGE UP (ref 0.5–1.3)
EOSINOPHIL # BLD AUTO: 0.11 K/UL — SIGNIFICANT CHANGE UP (ref 0–0.5)
EOSINOPHIL NFR BLD AUTO: 1.3 % — SIGNIFICANT CHANGE UP (ref 0–6)
GLUCOSE BLDC GLUCOMTR-MCNC: 100 MG/DL — HIGH (ref 70–99)
GLUCOSE SERPL-MCNC: 109 MG/DL — HIGH (ref 70–99)
GLUCOSE SERPL-MCNC: 111 MG/DL — HIGH (ref 70–99)
HCT VFR BLD CALC: 44.5 % — SIGNIFICANT CHANGE UP (ref 39–50)
HGB BLD-MCNC: 14.4 G/DL — SIGNIFICANT CHANGE UP (ref 13–17)
IMM GRANULOCYTES NFR BLD AUTO: 0.2 % — SIGNIFICANT CHANGE UP (ref 0–1.5)
INR BLD: 0.98 RATIO — SIGNIFICANT CHANGE UP (ref 0.88–1.16)
LYMPHOCYTES # BLD AUTO: 1.76 K/UL — SIGNIFICANT CHANGE UP (ref 1–3.3)
LYMPHOCYTES # BLD AUTO: 21.6 % — SIGNIFICANT CHANGE UP (ref 13–44)
MCHC RBC-ENTMCNC: 27.1 PG — SIGNIFICANT CHANGE UP (ref 27–34)
MCHC RBC-ENTMCNC: 32.4 GM/DL — SIGNIFICANT CHANGE UP (ref 32–36)
MCV RBC AUTO: 83.6 FL — SIGNIFICANT CHANGE UP (ref 80–100)
MONOCYTES # BLD AUTO: 0.91 K/UL — HIGH (ref 0–0.9)
MONOCYTES NFR BLD AUTO: 11.2 % — SIGNIFICANT CHANGE UP (ref 2–14)
NEUTROPHILS # BLD AUTO: 5.32 K/UL — SIGNIFICANT CHANGE UP (ref 1.8–7.4)
NEUTROPHILS NFR BLD AUTO: 65.2 % — SIGNIFICANT CHANGE UP (ref 43–77)
NT-PROBNP SERPL-SCNC: 52 PG/ML — SIGNIFICANT CHANGE UP (ref 0–300)
PLATELET # BLD AUTO: 263 K/UL — SIGNIFICANT CHANGE UP (ref 150–400)
POTASSIUM SERPL-MCNC: 3.6 MMOL/L — SIGNIFICANT CHANGE UP (ref 3.5–5.3)
POTASSIUM SERPL-MCNC: 4.1 MMOL/L — SIGNIFICANT CHANGE UP (ref 3.5–5.3)
POTASSIUM SERPL-SCNC: 3.6 MMOL/L — SIGNIFICANT CHANGE UP (ref 3.5–5.3)
POTASSIUM SERPL-SCNC: 4.1 MMOL/L — SIGNIFICANT CHANGE UP (ref 3.5–5.3)
PROT SERPL-MCNC: 6.8 G/DL — SIGNIFICANT CHANGE UP (ref 6.6–8.7)
PROT SERPL-MCNC: 7.2 G/DL — SIGNIFICANT CHANGE UP (ref 6.6–8.7)
PROTHROM AB SERPL-ACNC: 11.4 SEC — SIGNIFICANT CHANGE UP (ref 10.6–13.6)
RBC # BLD: 5.32 M/UL — SIGNIFICANT CHANGE UP (ref 4.2–5.8)
RBC # FLD: 13.7 % — SIGNIFICANT CHANGE UP (ref 10.3–14.5)
SODIUM SERPL-SCNC: 139 MMOL/L — SIGNIFICANT CHANGE UP (ref 135–145)
SODIUM SERPL-SCNC: 139 MMOL/L — SIGNIFICANT CHANGE UP (ref 135–145)
TROPONIN T SERPL-MCNC: <0.01 NG/ML — SIGNIFICANT CHANGE UP (ref 0–0.06)
TROPONIN T SERPL-MCNC: <0.01 NG/ML — SIGNIFICANT CHANGE UP (ref 0–0.06)
WBC # BLD: 8.16 K/UL — SIGNIFICANT CHANGE UP (ref 3.8–10.5)
WBC # FLD AUTO: 8.16 K/UL — SIGNIFICANT CHANGE UP (ref 3.8–10.5)

## 2021-02-22 PROCEDURE — 93010 ELECTROCARDIOGRAM REPORT: CPT

## 2021-02-22 PROCEDURE — 71046 X-RAY EXAM CHEST 2 VIEWS: CPT | Mod: 26

## 2021-02-22 PROCEDURE — 73030 X-RAY EXAM OF SHOULDER: CPT | Mod: 26,LT

## 2021-02-22 PROCEDURE — 99220: CPT

## 2021-02-22 RX ORDER — HYDROCHLOROTHIAZIDE 25 MG
25 TABLET ORAL DAILY
Refills: 0 | Status: DISCONTINUED | OUTPATIENT
Start: 2021-02-22 | End: 2021-02-27

## 2021-02-22 RX ORDER — DEXTROSE 50 % IN WATER 50 %
25 SYRINGE (ML) INTRAVENOUS ONCE
Refills: 0 | Status: DISCONTINUED | OUTPATIENT
Start: 2021-02-22 | End: 2021-02-27

## 2021-02-22 RX ORDER — ASPIRIN/CALCIUM CARB/MAGNESIUM 324 MG
81 TABLET ORAL DAILY
Refills: 0 | Status: DISCONTINUED | OUTPATIENT
Start: 2021-02-22 | End: 2021-02-27

## 2021-02-22 RX ORDER — INSULIN LISPRO 100/ML
10 VIAL (ML) SUBCUTANEOUS
Refills: 0 | Status: DISCONTINUED | OUTPATIENT
Start: 2021-02-22 | End: 2021-02-27

## 2021-02-22 RX ORDER — ASPIRIN/CALCIUM CARB/MAGNESIUM 324 MG
243 TABLET ORAL DAILY
Refills: 0 | Status: DISCONTINUED | OUTPATIENT
Start: 2021-02-22 | End: 2021-02-27

## 2021-02-22 RX ORDER — INSULIN GLARGINE 100 [IU]/ML
20 INJECTION, SOLUTION SUBCUTANEOUS AT BEDTIME
Refills: 0 | Status: DISCONTINUED | OUTPATIENT
Start: 2021-02-22 | End: 2021-02-27

## 2021-02-22 RX ORDER — GLUCAGON INJECTION, SOLUTION 0.5 MG/.1ML
1 INJECTION, SOLUTION SUBCUTANEOUS ONCE
Refills: 0 | Status: DISCONTINUED | OUTPATIENT
Start: 2021-02-22 | End: 2021-02-27

## 2021-02-22 RX ORDER — SIMVASTATIN 20 MG/1
40 TABLET, FILM COATED ORAL AT BEDTIME
Refills: 0 | Status: DISCONTINUED | OUTPATIENT
Start: 2021-02-22 | End: 2021-02-27

## 2021-02-22 RX ORDER — SODIUM CHLORIDE 9 MG/ML
1000 INJECTION, SOLUTION INTRAVENOUS
Refills: 0 | Status: DISCONTINUED | OUTPATIENT
Start: 2021-02-22 | End: 2021-02-27

## 2021-02-22 RX ORDER — KETOROLAC TROMETHAMINE 30 MG/ML
30 SYRINGE (ML) INJECTION ONCE
Refills: 0 | Status: DISCONTINUED | OUTPATIENT
Start: 2021-02-22 | End: 2021-02-22

## 2021-02-22 RX ORDER — ACETAMINOPHEN 500 MG
975 TABLET ORAL ONCE
Refills: 0 | Status: COMPLETED | OUTPATIENT
Start: 2021-02-22 | End: 2021-02-22

## 2021-02-22 RX ORDER — OXYCODONE HYDROCHLORIDE 5 MG/1
5 TABLET ORAL ONCE
Refills: 0 | Status: DISCONTINUED | OUTPATIENT
Start: 2021-02-22 | End: 2021-02-22

## 2021-02-22 RX ORDER — DEXTROSE 50 % IN WATER 50 %
15 SYRINGE (ML) INTRAVENOUS ONCE
Refills: 0 | Status: DISCONTINUED | OUTPATIENT
Start: 2021-02-22 | End: 2021-02-27

## 2021-02-22 RX ORDER — DEXTROSE 50 % IN WATER 50 %
12.5 SYRINGE (ML) INTRAVENOUS ONCE
Refills: 0 | Status: DISCONTINUED | OUTPATIENT
Start: 2021-02-22 | End: 2021-02-27

## 2021-02-22 RX ORDER — LEVOTHYROXINE SODIUM 125 MCG
25 TABLET ORAL DAILY
Refills: 0 | Status: DISCONTINUED | OUTPATIENT
Start: 2021-02-22 | End: 2021-02-27

## 2021-02-22 RX ORDER — LOSARTAN POTASSIUM 100 MG/1
100 TABLET, FILM COATED ORAL DAILY
Refills: 0 | Status: DISCONTINUED | OUTPATIENT
Start: 2021-02-22 | End: 2021-02-27

## 2021-02-22 RX ADMIN — Medication 975 MILLIGRAM(S): at 18:53

## 2021-02-22 RX ADMIN — OXYCODONE HYDROCHLORIDE 5 MILLIGRAM(S): 5 TABLET ORAL at 19:42

## 2021-02-22 RX ADMIN — Medication 30 MILLIGRAM(S): at 19:43

## 2021-02-22 RX ADMIN — Medication 243 MILLIGRAM(S): at 21:05

## 2021-02-22 NOTE — ED PROVIDER NOTE - ATTENDING CONTRIBUTION TO CARE
seen with resident: well appearing adult male with syncopal episode while eating; on exam, NAD, well appearing; normal conjunctiva; normal heart and lung sounds; abd soft nt nd; no pedal edema; labs ecg and imaging reviewed; plan for obs overnight for echo, tele, serial enzymes

## 2021-02-22 NOTE — ED ADULT NURSE REASSESSMENT NOTE - NS ED NURSE REASSESS COMMENT FT1
Received pt from Danya TYSON. PT c/o pain to left shoulder radiating down left arm. PT to be medicated per orders. CTM

## 2021-02-22 NOTE — ED ADULT TRIAGE NOTE - CHIEF COMPLAINT QUOTE
Pt was eating at restaurant with friend when he states the next thing he knows, he was on the floor waking up with left arm/left shoulder pain. Pt states no history of syncope and takes high BP meds.

## 2021-02-22 NOTE — ED ADULT NURSE NOTE - CAS TRG GENERAL NORM CIRC DET
Strong peripheral pulses Non-Graft Cartilage Fenestration Text: The cartilage was fenestrated with a 2mm punch biopsy to help facilitate healing.

## 2021-02-22 NOTE — ED PROVIDER NOTE - PROGRESS NOTE DETAILS
Reviewed all results with pt as well as plans for observation admission. Pt is comfortable with plan for obs stay. Questions answered. - Moreno Doe, PGY-2

## 2021-02-22 NOTE — ED CDU PROVIDER INITIAL DAY NOTE - OBJECTIVE STATEMENT
PT with SPMHX of DM, HTN, HLD presents to the ED with complaint of near syncopal episode with evening. Pt states that he went out to eat sat down started to feel light headed and fell off his chair. Pt states that he remembers just before, during and after the incident, Pt states that he never hit his head. Pt states that he has recently started to take a muscle relaxer to Tx Lt sided shoulder pain that occurred from shoveling snow 4 dyas ago. Pt states that he feels well at this time. Pt dines fever, chill, SOB, diff breathing, HA, dizziness, change in vision, confession, neck pain, back pain.

## 2021-02-22 NOTE — ED PROVIDER NOTE - PMH
DM (diabetes mellitus)    HLD (hyperlipidemia)    HTN (hypertension)    HTN (hypertension)    Hypothyroidism    IDDM (insulin dependent diabetes mellitus)

## 2021-02-22 NOTE — ED CDU PROVIDER INITIAL DAY NOTE - MEDICAL DECISION MAKING DETAILS
PT with near syncopal that was either cardiogenic vs medication related placed in obs for Diagnostic uncertainty. PT seen BY OBS PA found to be appropriate CDU PT care transferred to PA.

## 2021-02-22 NOTE — ED PROVIDER NOTE - PHYSICAL EXAMINATION
Gen: no acute distress  Head: normocephalic, atraumatic  Lung: CTAB, no respiratory distress, no wheezing, rales, rhonchi  CV: normal s1/s2, rrr,   Abd: soft, non-tender, non-distended  MSK: No edema, no visible deformities, L shoulder TTP, limited ROM to L shoulder 2/2 pain,  full range of motion in all other extremities  Neuro: No focal neurologic deficits  Skin: No rash   Psych: normal affect

## 2021-02-22 NOTE — ED PROVIDER NOTE - CLINICAL SUMMARY MEDICAL DECISION MAKING FREE TEXT BOX
Pt with hx of htn, hld, dm, presenting after syncopal episode today. Currently c/o L shoulder pain that is very reproducible. WIll check labs, ekg, trop, cxr, XR L shoulder, reeval.

## 2021-02-22 NOTE — ED CDU PROVIDER INITIAL DAY NOTE - ATTENDING CONTRIBUTION TO CARE
Pt. admitted to observation unit for near-syncope work up. Pt. stable appearing. I, Dr. Hermosillo, performed a face to face bedside interview with this patient regarding history of present illness, review of symptoms and relevant past medical, social and family history.  I completed an independent physical examination.  I have also reviewed the ACP's note(s) and discussed the plan with the ACP.

## 2021-02-23 VITALS
OXYGEN SATURATION: 100 % | HEART RATE: 63 BPM | SYSTOLIC BLOOD PRESSURE: 163 MMHG | RESPIRATION RATE: 18 BRPM | DIASTOLIC BLOOD PRESSURE: 67 MMHG

## 2021-02-23 LAB
GLUCOSE BLDC GLUCOMTR-MCNC: 140 MG/DL — HIGH (ref 70–99)
GLUCOSE BLDC GLUCOMTR-MCNC: 146 MG/DL — HIGH (ref 70–99)
GLUCOSE BLDC GLUCOMTR-MCNC: 230 MG/DL — HIGH (ref 70–99)
GLUCOSE BLDC GLUCOMTR-MCNC: 88 MG/DL — SIGNIFICANT CHANGE UP (ref 70–99)
T3 SERPL-MCNC: 103 NG/DL — SIGNIFICANT CHANGE UP (ref 80–200)
T4 AB SER-ACNC: 6.8 UG/DL — SIGNIFICANT CHANGE UP (ref 4.5–12)
TROPONIN T SERPL-MCNC: <0.01 NG/ML — SIGNIFICANT CHANGE UP (ref 0–0.06)
TROPONIN T SERPL-MCNC: <0.01 NG/ML — SIGNIFICANT CHANGE UP (ref 0–0.06)
TSH SERPL-MCNC: 3.77 UIU/ML — SIGNIFICANT CHANGE UP (ref 0.27–4.2)

## 2021-02-23 PROCEDURE — 73030 X-RAY EXAM OF SHOULDER: CPT

## 2021-02-23 PROCEDURE — 80053 COMPREHEN METABOLIC PANEL: CPT

## 2021-02-23 PROCEDURE — G1004: CPT

## 2021-02-23 PROCEDURE — 93306 TTE W/DOPPLER COMPLETE: CPT | Mod: 26

## 2021-02-23 PROCEDURE — 85730 THROMBOPLASTIN TIME PARTIAL: CPT

## 2021-02-23 PROCEDURE — 82962 GLUCOSE BLOOD TEST: CPT

## 2021-02-23 PROCEDURE — 84484 ASSAY OF TROPONIN QUANT: CPT

## 2021-02-23 PROCEDURE — G0378: CPT

## 2021-02-23 PROCEDURE — 93005 ELECTROCARDIOGRAM TRACING: CPT

## 2021-02-23 PROCEDURE — 83880 ASSAY OF NATRIURETIC PEPTIDE: CPT

## 2021-02-23 PROCEDURE — 84480 ASSAY TRIIODOTHYRONINE (T3): CPT

## 2021-02-23 PROCEDURE — 99217: CPT

## 2021-02-23 PROCEDURE — 85025 COMPLETE CBC W/AUTO DIFF WBC: CPT

## 2021-02-23 PROCEDURE — 99284 EMERGENCY DEPT VISIT MOD MDM: CPT | Mod: 25

## 2021-02-23 PROCEDURE — 96376 TX/PRO/DX INJ SAME DRUG ADON: CPT

## 2021-02-23 PROCEDURE — 71046 X-RAY EXAM CHEST 2 VIEWS: CPT

## 2021-02-23 PROCEDURE — 70450 CT HEAD/BRAIN W/O DYE: CPT | Mod: 26,MD

## 2021-02-23 PROCEDURE — 36415 COLL VENOUS BLD VENIPUNCTURE: CPT

## 2021-02-23 PROCEDURE — 84436 ASSAY OF TOTAL THYROXINE: CPT

## 2021-02-23 PROCEDURE — 85610 PROTHROMBIN TIME: CPT

## 2021-02-23 PROCEDURE — 84443 ASSAY THYROID STIM HORMONE: CPT

## 2021-02-23 PROCEDURE — 93306 TTE W/DOPPLER COMPLETE: CPT

## 2021-02-23 PROCEDURE — 96374 THER/PROPH/DIAG INJ IV PUSH: CPT | Mod: XU

## 2021-02-23 PROCEDURE — 70450 CT HEAD/BRAIN W/O DYE: CPT

## 2021-02-23 PROCEDURE — 70551 MRI BRAIN STEM W/O DYE: CPT

## 2021-02-23 PROCEDURE — 70551 MRI BRAIN STEM W/O DYE: CPT | Mod: 26,MG

## 2021-02-23 RX ORDER — KETOROLAC TROMETHAMINE 30 MG/ML
30 SYRINGE (ML) INJECTION ONCE
Refills: 0 | Status: DISCONTINUED | OUTPATIENT
Start: 2021-02-23 | End: 2021-02-23

## 2021-02-23 RX ORDER — SODIUM CHLORIDE 9 MG/ML
1000 INJECTION, SOLUTION INTRAVENOUS ONCE
Refills: 0 | Status: COMPLETED | OUTPATIENT
Start: 2021-02-23 | End: 2021-02-23

## 2021-02-23 RX ORDER — LIDOCAINE 4 G/100G
1 CREAM TOPICAL
Qty: 10 | Refills: 0
Start: 2021-02-23 | End: 2021-03-04

## 2021-02-23 RX ORDER — ONDANSETRON 8 MG/1
4 TABLET, FILM COATED ORAL ONCE
Refills: 0 | Status: COMPLETED | OUTPATIENT
Start: 2021-02-23 | End: 2021-02-23

## 2021-02-23 RX ORDER — IBUPROFEN 200 MG
1 TABLET ORAL
Qty: 28 | Refills: 0
Start: 2021-02-23 | End: 2021-03-01

## 2021-02-23 RX ORDER — MECLIZINE HCL 12.5 MG
50 TABLET ORAL EVERY 6 HOURS
Refills: 0 | Status: DISCONTINUED | OUTPATIENT
Start: 2021-02-23 | End: 2021-02-27

## 2021-02-23 RX ADMIN — SODIUM CHLORIDE 1000 MILLILITER(S): 9 INJECTION, SOLUTION INTRAVENOUS at 12:01

## 2021-02-23 RX ADMIN — Medication 50 MILLIGRAM(S): at 12:10

## 2021-02-23 RX ADMIN — Medication 30 MILLIGRAM(S): at 09:30

## 2021-02-23 RX ADMIN — LOSARTAN POTASSIUM 100 MILLIGRAM(S): 100 TABLET, FILM COATED ORAL at 05:34

## 2021-02-23 RX ADMIN — Medication 10 UNIT(S): at 12:09

## 2021-02-23 RX ADMIN — Medication 10 UNIT(S): at 08:36

## 2021-02-23 RX ADMIN — Medication 50 MILLIGRAM(S): at 05:34

## 2021-02-23 RX ADMIN — Medication 243 MILLIGRAM(S): at 12:09

## 2021-02-23 RX ADMIN — Medication 81 MILLIGRAM(S): at 12:09

## 2021-02-23 RX ADMIN — Medication 25 MILLIGRAM(S): at 05:34

## 2021-02-23 RX ADMIN — Medication 20 MILLIGRAM(S): at 12:00

## 2021-02-23 RX ADMIN — ONDANSETRON 4 MILLIGRAM(S): 8 TABLET, FILM COATED ORAL at 01:22

## 2021-02-23 RX ADMIN — Medication 25 MICROGRAM(S): at 05:34

## 2021-02-23 NOTE — CONSULT NOTE ADULT - ASSESSMENT
61yo male w/PMH HTN, HLD, IDDMII, hypothyroid. Patient presents to ED last night after a near syncopal episode. Patient states that 1/2 way through dinner he had a gradual progression of lightheadedness, followed by cold sweats, and lethargy, denies LOC/syncope, afterwards felt lingering weakness. States that for the past 2 days has had left muscular shoulder and arm pain that is reproducible w/movement and palpation that he has been taking muscle relaxers for. He denies that the pain was severe during event. While in ED last night had similar episode w/some lightheadedness followed by n/v, no arrhythmia noted on CM at that time. This morning after finishing breakfast pt had EXACT episode as PTA, on CM noted to have gradually worsening Sinus bradycardia to 33bpm w/?junctional rhythm and sinus arrest and hypotension and gradual increase back to SR and lingering weakness.  Patient follows closely w/cardiologist Dr. José Manuel Kwok ([phone]) and has routine yearly nml TTE and NSTs.  Denies exertional chest pain/pressure, palpitations, irregular and/or rapid heart beat, SOB, MUIR, orthopnea, PND, cough, edema, f/c, hematuria, or hematochezia.     Evaluated w/EP / Renan      Near Syncope  -EKG SR 73, IRBBB  -Tele noted to have gradually worsening Sinus bradycardia to 33bpm w/?junctional rhythm and sinus arrest w/associated hypotension and gradual increase back to SR  -TSH nml  -TTE EF 60-65%. GR I DD. Trace MVR  -appears to be vasovagal events.   -advised pt to stay well hydrated, make sure urine is clear yellow, currently concentrated. IVF x1 now  -educated pt that if prodrome occurs, if possible to sit down or lay down and elevate legs, if unable to he should cross legs and  hands tightening all muscles until symptoms resolve  -start Paxil 20mg PO QD, 1st dose now to prevent recurrent events  -f/u outpatient EP Dr. Urrutia in 1-2 weeks      Discussed w/Dr. Mayes. Patient verbalized understanding    Thank you for allowing me to participate in care of your patient.   Please call as needed.

## 2021-02-23 NOTE — ED ADULT NURSE REASSESSMENT NOTE - NS ED NURSE REASSESS COMMENT FT1
Pt became nauseous and vomited.  PT appears lethargic and stating hes dizzy and is going to have diarrhea. Glucose 143.  /92 HR 67.  LORA Barr at bedside to assess pt. PO Zofran given per orders and pt placed on bedpan.

## 2021-02-23 NOTE — ED CDU PROVIDER SUBSEQUENT DAY NOTE - TEMPLATE, MLM
Nutrition Assessment for:   Follow up    Assessment: Pt is 56 yo male who presented with weakness, facial drooping and slurred speech. He has history of CVA, DM type 2, HTN, pancreatitis, GERD and a smoker. Dietetic intern visited with patient. He continues with speech barriers but was able to repond to questions. He reports that he is eating >75% of meals and >75% Glucerna. He says that he does not always finish the Glucerna because he has other beverages with meals. Denies any concerns or additional needs to facilitate intake. Results for Beka Wiley (MRN [id number]) as of 1/20/2020 12:12   Ref. Range 1/17/2020 21:01 1/18/2020 06:59 1/18/2020 10:22 1/19/2020 07:33 1/20/2020 07:56   GLUCOSE,FAST - POC Latest Ref Range: 65 - 100 mg/dL 83 115 (H) 97 83 125 (H)     DIET NUTRITIONAL SUPPLEMENTS All Meals; Glucerna Shake  DIET DIABETIC CONSISTENT CARB Mechanical Soft      Anthropometrics:  Height: 5' 6\" (167.6 cm), Weight: 95.3 kg (210 lb), source not specified, Body mass index is 33.89 kg/m². BMI class of obese. Macronutrient needs: MSJ (using CBW (Current body weight) unknown 95.3 kg)  EER: 2130 kcal/day (22 kcals/kg )  EPR: 106 g/day (1.1 g/kg ) (20%)    Intake/Comparative Standards: Per patient, consuming >75% most meals. This meets % of kcal needs and % of protein needs. Nutrition Intervention:  Meals and snacks: Continue current diet. Progressions per SLP. Medical food supplement therapy: Continue Glucerna TID (Kelly Cruz). Blood glucose remains appropriate for continued use. Discharge Nutrition Plan: With improving PO intake, frequency of Glucerna may be reduced or discontinued at discharge.     Jos Osorio, Dietetic Intern Abdominal Pain, N/V/D

## 2021-02-23 NOTE — ED CDU PROVIDER SUBSEQUENT DAY NOTE - HISTORY
PT placed in OBS, has had no acute incidents or complaints while in CDU PT is stable. PT Placed in OBS for evaluation of recurrent near syncope events were pt becomes light headed nausea, Pt awaiting MR, ECHO, continued Antivert, revaluations.

## 2021-02-23 NOTE — ED CDU PROVIDER DISPOSITION NOTE - CLINICAL COURSE
60 year old male with PMH HTn, HLD, NIDDM presents with syncope. Pt states that he was eating dinner, when he had a prodrome of dizziness, lightheadedness and then proceeded to syncopize. Pt was placed on Obs. Overnight he had an episode of dizziness, and an MRi was ordered. However, the pt did not have any true vertiginous dizziness, and was more a prodromal lightheadedness. MRI negative. While here, again while eating, the pt started to feel lightheaded, became sinus missael to 30s, diaphoretic, bop initially dropped to 80s/50s, then quickly resolved and he is asymptomatic currently. Cardio michelle pinoueve this to be vagal in nature. Advised starting Paxil, aggressively hydrating and f/u with Dr. Urrutia in the office.

## 2021-02-23 NOTE — ED ADULT NURSE REASSESSMENT NOTE - NS ED NURSE REASSESS COMMENT FT1
pt. returns from MRI. pt. a&ox3. pt. returns from MRI. pt. a&ox3. pt. denies pain or discomfort at this time. pt. in no apparent distress at this time, breathing even and unlabored. pt. informed on plan of care.

## 2021-02-23 NOTE — CONSULT NOTE ADULT - SUBJECTIVE AND OBJECTIVE BOX
[facility]                                                               Office:  [address] [address]                                                              Telephone: [phone]. Fax:[phone]                                                                        CARDIOLOGY CONSULTATION NOTE                                                                                             Consult requested by:  Dr. Mayes  Reason for Consultation: near syncope, symptomatic bradycardia  History obtained by: Patient and medical record   obtained: No    Chief complaint:    Patient is a 60y old  Male who presents with a chief complaint of lightheadedness, weakness, diaphoresis.    HPI:  61yo male w/PMH HTN, HLD, IDDMII, hypothyroid. Patient presents to ED last night after a near syncopal episode. Patient states that 1/2 way through dinner he had a gradual progression of lightheadedness, followed by cold sweats, and lethargy, denies LOC/syncope, afterwards felt lingering weakness. States that for the past 2 days has had left muscular shoulder and arm pain that is reproducible w/movement and palpation that he has been taking muscle relaxers for. He denies that the pain was severe during event. While in ED last night had similar episode w/some lightheadedness followed by n/v, no arrhythmia noted on CM at that time. This morning after finishing breakfast pt had EXACT episode as PTA, on CM noted to have gradually worsening Sinus bradycardia to 33bpm w/?junctional rhythm and sinus arrest and hypotension and gradual increase back to SR and lingering weakness.  Patient follows closely w/cardiologist Dr. José Manuel Kwok ([phone]) and has routine yearly nml TTE and NSTs.  Denies exertional chest pain/pressure, palpitations, irregular and/or rapid heart beat, SOB, MUIR, orthopnea, PND, cough, edema, f/c, hematuria, or hematochezia.     REVIEW OF SYMPTOMS:     CONSTITUTIONAL: No fever, weight loss, or fatigue  ENMT:  No difficulty hearing, tinnitus, vertigo; No sinus or throat pain  NECK: No pain or stiffness  CARDIOVASCULAR: as per HPI  RESPIRATORY: No Dyspnea on exertion, Shortness of breath, cough, wheezing  : No dysuria, no hematuria   GI: No dark color stool, no melena, no diarrhea, no constipation, no abdominal pain   NEURO: as per HPI  MUSCULOSKELETAL: as per HPI  PSYCH: No agitation, no anxiety.    ALL OTHER REVIEW OF SYSTEMS ARE NEGATIVE.      PREVIOUS DIAGNOSTIC TESTING  no prior documented        ALLERGIES: Allergies    No Known Allergies    Intolerances          PAST MEDICAL HISTORY  HLD (hyperlipidemia)    HTN (hypertension)    Hypothyroidism    DM (diabetes mellitus)        PAST SURGICAL HISTORY  No significant past surgical history        FAMILY HISTORY:  No pertinent family history in first degree relatives        SOCIAL HISTORY:    CIGARETTES: Denies  ALCOHOL: Denies  DRUGS: Denies      CURRENT MEDICATIONS:  hydrochlorothiazide 25 milliGRAM(s) Oral daily  losartan 100 milliGRAM(s) Oral daily     ketorolac   Injectable  meclizine  aspirin  chewable  aspirin enteric coated  dextrose 40% Gel  dextrose 5%.  dextrose 5%.  dextrose 50% Injectable  dextrose 50% Injectable  dextrose 50% Injectable  glucagon  Injectable  insulin glargine Injectable (LANTUS)  insulin lispro Injectable (ADMELOG)  levothyroxine  simvastatin        HOME MEDICATIONS:  Losartan  HCTZ  Zetia  Insulin  Synthroid    Vital Signs Last 24 Hrs  T(C): 36.7 (23 Feb 2021 10:27), Max: 36.8 (23 Feb 2021 08:01)  T(F): 98 (23 Feb 2021 10:27), Max: 98.2 (23 Feb 2021 08:01)  HR: 64 (23 Feb 2021 10:27) (44 - 76)  BP: 153/67 (23 Feb 2021 10:27) (83/47 - 183/78)  BP(mean): --  RR: 16 (23 Feb 2021 10:27) (16 - 18)  SpO2: 100% (23 Feb 2021 10:27) (97% - 100%)      PHYSICAL EXAM:  Appearance: NAD, well nourished	  Neurologic: A&Ox3, PERRL, EOMI, Grossly non-focal with full strength in all four extremities  HEENT:   Normal oral mucosa, sclera non-icteric	  Lymphatic: No cervical lymphadenopathy  Cardiovascular: Normal S1 S2, No JVD, No murmur, No carotid bruits  Respiratory: Lungs clear to auscultation	  Psychiatry: Mood & affect appropriate  Gastrointestinal:  Soft, Non-tender, + BS, no bruits	  Extremities: Tenderness and limited ROM to Left shoulder. No clubbing, cyanosis or edema  Vascular: Peripheral pulses palpable 2+ bilaterally  Skin: No Erythema, No ecchymoses, No cyanosis, No rashes  Lines and Tubes: n/a    Intake and output:     LABS:                        14.4   8.16  )-----------( 263      ( 22 Feb 2021 18:20 )             44.5     02-22    139  |  103  |  13.0  ----------------------------<  111<H>  4.1   |  27.0  |  1.11    Ca    9.1      22 Feb 2021 21:37    TPro  6.8  /  Alb  3.9  /  TBili  <0.2<L>  /  DBili  x   /  AST  21  /  ALT  23  /  AlkPhos  84  02-22    CARDIAC MARKERS ( 23 Feb 2021 09:50 )  x     / <0.01 ng/mL / x     / x     / x      CARDIAC MARKERS ( 23 Feb 2021 03:44 )  x     / <0.01 ng/mL / x     / x     / x      CARDIAC MARKERS ( 22 Feb 2021 21:37 )  x     / <0.01 ng/mL / x     / x     / x      CARDIAC MARKERS ( 22 Feb 2021 18:20 )  x     / <0.01 ng/mL / x     / x     / x        ;p-BNP=Serum Pro-Brain Natriuretic Peptide: 52 pg/mL (02-22 @ 21:37)  Serum Pro-Brain Natriuretic Peptide: 62 pg/mL (02-22 @ 18:20)    PT/INR - ( 22 Feb 2021 18:20 )   PT: 11.4 sec;   INR: 0.98 ratio         PTT - ( 22 Feb 2021 18:20 )  PTT:29.7 sec      INTERPRETATION OF TELEMETRY: Reviewed by me.   ECG: Reviewed by me.     RADIOLOGY & ADDITIONAL STUDIES:    X-ray:    < from: Xray Chest 2 Views PA/Lat (02.22.21 @ 18:41) >  IMPRESSION:    No acute radiographic cardiopulmonary pathology.    < end of copied text >      CT scan:   < from: CT Head No Cont (02.23.21 @ 02:04) >  IMPRESSION:  No acute intracranial hemorrhage, territorial infarct or mass effect.    < end of copied text >    MRI:   < from: MR Head No Cont (02.23.21 @ 07:30) >  IMPRESSION: No acute infarction.    < end of copied text >      TTE:  < from: TTE Echo Complete w/o Contrast w/ Doppler (02.23.21 @ 08:14) >  Summary:   1. Technically good study.   2. Normal global left ventricular systolic function.   3. Left ventricular ejection fraction, by visual estimation, is 60 to 65%.   4. Spectral Doppler shows impaired relaxation pattern of left ventricular myocardial filling (Grade I diastolic dysfunction).   5. Trace mitral valve regurgitation.   6. There is no evidence of pericardial effusion.    MD Karen Electronically signed on 2/23/2021 at 9:07:32 AM    < end of copied text >

## 2021-02-23 NOTE — ED CDU PROVIDER SUBSEQUENT DAY NOTE - ATTENDING CONTRIBUTION TO CARE
I, Rivera Mayes, performed a face to face bedside interview with this patient regarding history of present illness, review of symptoms and relevant past medical, social and family history.  I completed an independent physical examination. I have communicated the patient’s plan of care and disposition with the ACP.  Pt placed on Obs for syncopal episode with associated dizziness. place don Obs for echo, mri. pt had 1 episode of dizziness overnight, no other acute events  Gen: NAD, well appearing  CV: RRR  Pul: CTA b/l  Abd: Soft, non-distended, non-tender  Neuro: no focal deficits

## 2021-02-23 NOTE — ED ADULT NURSE REASSESSMENT NOTE - NS ED NURSE REASSESS COMMENT FT1
monitor tech called to notify RN that patient was bradying to the 40's. pt. lethargic, slumped over. pt. states he feels dizzy and sweaty. MD. Mayes called to bedside. EKG obtained. BS. obtained.

## 2021-02-23 NOTE — ED CDU PROVIDER DISPOSITION NOTE - CARE PROVIDER_API CALL
Malik Urrutia)  Cardiology; Internal Medicine  [address] [place] [address], [place] [address]  Phone: [phone]  Fax: [phone]  Follow Up Time:

## 2021-02-23 NOTE — CONSULT NOTE ADULT - ATTENDING COMMENTS
I have personally seen, examined, and participated in the care of this patient. I have reviewed all pertinent clinical information, including history, physical exam, plan, and the PA/NP's note and agree except as noted below.    60 year old male with HTN, DM and hypothyroidism who has had 3 episodes of near syncope, one of which was caught on telemetry associated with gradual slowing of sinus rate followed by sinus arrest, junctional escape, and then slow resumption of sinus activity. These episodes occur with a prodrome of cold sweats, lightheadedness and sometimes associated with nausea. Following the episode he continues to feel "off" after recovery of sinus arrest. These episodes are most consistent with a vasovagal event, albeit with a significant cardioinhibitory component. He did have one episode while on telemetry where there was no change in sinus rates. I recommended conservative management to start with increased hydration, laying down when he feels the prodrome, as well as counterpressure maneuvers to break episodes. We will also start him on Paroxetine 20mg daily which can help with events. If he continues to have episodes without improvement, then we can consider pacing. He will follow up with me in the office for follow up.    Malik Urrutia MD  Clinical Cardiac Electrophysiology

## 2021-02-23 NOTE — ED CDU PROVIDER DISPOSITION NOTE - PATIENT PORTAL LINK FT
You can access the FollowMyHealth Patient Portal offered by [facility] by registering at the following website: [email or web address]. By joining [employer]’s FollowMyHealth portal, you will also be able to view your health information using other applications (apps) compatible with our system.

## 2021-02-23 NOTE — ED ADULT NURSE REASSESSMENT NOTE - NS ED NURSE REASSESS COMMENT FT1
pt. c/o left shoulder pain. PA. advised. awaiting pain meds. pt. c/o left shoulder pain. pt. states the pain medications he took yesterday helped him. PA. advised. awaiting pain meds.

## 2021-02-24 PROBLEM — I10 ESSENTIAL (PRIMARY) HYPERTENSION: Chronic | Status: ACTIVE | Noted: 2021-02-22

## 2021-02-24 PROBLEM — E11.9 TYPE 2 DIABETES MELLITUS WITHOUT COMPLICATIONS: Chronic | Status: ACTIVE | Noted: 2021-02-22

## 2021-02-24 PROBLEM — E78.5 HYPERLIPIDEMIA, UNSPECIFIED: Chronic | Status: ACTIVE | Noted: 2021-02-22

## 2021-02-24 PROBLEM — E03.9 HYPOTHYROIDISM, UNSPECIFIED: Chronic | Status: ACTIVE | Noted: 2021-02-22

## 2021-02-26 ENCOUNTER — APPOINTMENT (OUTPATIENT)
Dept: ORTHOPEDIC SURGERY | Facility: CLINIC | Age: 61
End: 2021-02-26
Payer: COMMERCIAL

## 2021-02-26 VITALS — WEIGHT: 176 LBS | BODY MASS INDEX: 30.05 KG/M2 | HEIGHT: 64 IN

## 2021-02-26 VITALS — BODY MASS INDEX: 29.32 KG/M2 | HEIGHT: 65 IN | WEIGHT: 176 LBS

## 2021-02-26 DIAGNOSIS — Z86.39 PERSONAL HISTORY OF OTHER ENDOCRINE, NUTRITIONAL AND METABOLIC DISEASE: ICD-10-CM

## 2021-02-26 DIAGNOSIS — Z86.79 PERSONAL HISTORY OF OTHER DISEASES OF THE CIRCULATORY SYSTEM: ICD-10-CM

## 2021-02-26 DIAGNOSIS — Z60.2 PROBLEMS RELATED TO LIVING ALONE: ICD-10-CM

## 2021-02-26 PROCEDURE — 73030 X-RAY EXAM OF SHOULDER: CPT | Mod: LT

## 2021-02-26 PROCEDURE — 20610 DRAIN/INJ JOINT/BURSA W/O US: CPT | Mod: LT

## 2021-02-26 PROCEDURE — 99072 ADDL SUPL MATRL&STAF TM PHE: CPT

## 2021-02-26 PROCEDURE — 99204 OFFICE O/P NEW MOD 45 MIN: CPT | Mod: 25

## 2021-02-26 SDOH — SOCIAL STABILITY - SOCIAL INSECURITY: PROBLEMS RELATED TO LIVING ALONE: Z60.2

## 2021-02-26 NOTE — PHYSICAL EXAM
[de-identified] : Constitutional: Well-nourished, well-developed, No acute distress\par Respiratory:  Good respiratory effort, no SOB\par Lymphatic: No regional lymphadenopathy, no lymphedema\par Psychiatric: Pleasant and normal affect, alert and oriented x3\par Skin: Clean dry and intact B/L UE/LE\par Musculoskeletal: normal except where as noted in regional exam\par \par \par Right Shoulder:\par APPEARANCE: no marked deformities, no swelling or malalignment\par POSITIVE TENDERNESS: none\par NONTENDER: supraspinatus, infraspinatus, teres minor, LH biceps, anterior and posterior capsule, AC joint\par ROM: full & painless, no scapular winging or dyskinesia present\par RESISTIVE TESTING: painless 5/5 resisted flex/ext, empty can/ER/IR, horizontal abd/add \par SPECIAL TESTS: neg Drop Arm, neg Empty Can, neg Baker/Neers, neg Lyn's, neg Speeds, neg Apprehension, neg cross arm adduction, neg apley's scratch test\par Vasc: 2+ radial pulse\par Neuro: AIN, PIN, Ulnar nerve intact to motor, DTRs 2+/4 biceps, triceps, brachioradialis\par Sensation: Intact to light touch throughout\par B/L Elbows:  No asymmetry, malalignment, or swelling, Full ROM, 5/5 strength in flexion/ext, pronation/supination, Joints stable\par B/L Wrist and Hand:  No asymmetry, malalignment, or swelling, Full ROM, 5/5 strength in wrist and long finger flexion/ext, radial/ulnar deviation, Joints stable\par \par Left Shoulder:\par APPEARANCE: no marked deformities, no swelling or malalignment\par POSITIVE TENDERNESS: supraspinatus, long head biceps tendon\par NONTENDER:  infraspinatus, teres minor. biceps. anterior and posterior capsule. AC joint. \par ROM: full with mild painful arc past 60 degrees, no scapular winging or dyskinesia present\par RESISTIVE TESTING: MMT 4+/5 ER, Flexion and Empty can, 5/5 IR. painless 5/5 resisted ext, horizontal abd/add \par SPECIAL TESTS: + Baker and Neers, mildly + cross arm adduction, + Speeds, neg Lyn's, neg Drop Arm, neg Apprehension. neg apley's scratch test\par Vasc: 2+ radial pulse\par Neuro: AIN, PIN, Ulnar nerve intact to motor, DTRs 2+/4 biceps, triceps, brachioradialis\par Sensation: Intact to light touch throughout\par  [de-identified] : The following radiographs were ordered and read by me during this patient's visit. I reviewed each radiograph in detail with the patient and discussed the findings as highlighted below. \par \par 3 views of the left shoulder were obtained today that show no fracture, or dislocation. There are no degenerative changes seen. There is no malalignment. No obvious osseous abnormality. Otherwise unremarkable.\par \par

## 2021-02-26 NOTE — HISTORY OF PRESENT ILLNESS
[de-identified] : LHD Patient is here for left shoulder pain that began about a week ago after shoveling. He does not recall any inciting injury. He states that he feels pain raiate down his arm. He has used Ibuprofen and pain patches for treatment. Denies N/T/Prior injury. This is his first time seeking medical evaluation for this issue. \par \par The patient's past medical history, past surgical history, medications and allergies were reviewed by me today and documented accordingly. In addition, the patient's family and social history, which were noncontributory to this visit, were reviewed also. Intake form was reviewed. The patient has no family history of arthritis.

## 2021-02-26 NOTE — DISCUSSION/SUMMARY
[de-identified] : Discussed findings of today's exam and possible causes of patient's pain.  Educated patient on their most probable diagnosis of left shoulder impingement.  Reviewed possible courses of treatment, and we collaboratively decided best course of treatment at this time will include conservative management.  We discussed various treatment options as well as associated risk/benefits/alternatives and patient elected to proceed with cortisone injection today (see procedure note).  Informed the patient that the numbing medicine in today's injection will last for about 4-6 hours. The steroid that was injected will start to work in 1 to 2 days, peak at 1-2 weeks, and may last up to 1-2 months.  Patient is an insulin-dependent diabetic, I advised that the cortisone in today's injection may elevate blood glucose levels for the next 48-72 hours.  Patient should modify their diet accordingly and continue to check blood glucose levels and adjust insulin as needed.  Patient started on a course of oral NSAIDs, prescription given for Mobic (We discussed all possible side effects of this medication).  Patient will be started on a course of physical therapy to restore normal range of motion and strength as tolerated.  Follow up as needed.  Patient appreciates and agrees with current plan.\par \par This note was generated using dragon medical dictation software.  A reasonable effort has been made for proofreading its contents, but typos may still remain.  If there are any questions or points of clarification needed please notify my office.

## 2021-02-26 NOTE — PROCEDURE
[de-identified] : Injection: Left  Shoulder Subacromial Space.\par Indication: Impingement.\par \par A discussion was had with the patient regarding this procedure and all questions were answered. All risks, benefits and alternatives were discussed. These included but were not limited to bleeding, infection, and allergic reaction.  A timeout was done to ensure correct side and pt agreed to the procedure.   Alcohol was used to clean the skin, and betadine was used to sterilize and prep the area in the posterior aspect of the shoulder. Ethyl chloride spray was then used as a topical anesthetic. A 22-gauge 1.5" needle was used to inject 2cc of 1% lidocaine without epinephrine and 1cc of 40mg/ml methylprednisolone into the subacromial space. A sterile bandage was then applied. The patient tolerated the procedure well and there were no complications.\par

## 2021-07-15 ENCOUNTER — APPOINTMENT (OUTPATIENT)
Dept: GASTROENTEROLOGY | Facility: CLINIC | Age: 61
End: 2021-07-15
Payer: COMMERCIAL

## 2021-07-15 VITALS
HEART RATE: 69 BPM | DIASTOLIC BLOOD PRESSURE: 69 MMHG | HEIGHT: 65 IN | BODY MASS INDEX: 31.49 KG/M2 | WEIGHT: 189 LBS | SYSTOLIC BLOOD PRESSURE: 152 MMHG

## 2021-07-15 DIAGNOSIS — K29.40 CHRONIC ATROPHIC GASTRITIS W/OUT BLEEDING: ICD-10-CM

## 2021-07-15 DIAGNOSIS — Z86.010 PERSONAL HISTORY OF COLONIC POLYPS: ICD-10-CM

## 2021-07-15 DIAGNOSIS — R14.0 ABDOMINAL DISTENSION (GASEOUS): ICD-10-CM

## 2021-07-15 DIAGNOSIS — R11.2 NAUSEA WITH VOMITING, UNSPECIFIED: ICD-10-CM

## 2021-07-15 PROCEDURE — 99072 ADDL SUPL MATRL&STAF TM PHE: CPT

## 2021-07-15 PROCEDURE — 99214 OFFICE O/P EST MOD 30 MIN: CPT

## 2021-07-15 RX ORDER — PANTOPRAZOLE 20 MG/1
20 TABLET, DELAYED RELEASE ORAL DAILY
Qty: 30 | Refills: 5 | Status: DISCONTINUED | COMMUNITY
Start: 2020-07-29 | End: 2021-07-15

## 2021-07-15 RX ORDER — METOCLOPRAMIDE 10 MG/1
10 TABLET ORAL 3 TIMES DAILY
Qty: 42 | Refills: 0 | Status: DISCONTINUED | COMMUNITY
Start: 2020-07-29 | End: 2021-07-15

## 2021-07-15 RX ORDER — RIFAXIMIN 550 MG/1
550 TABLET ORAL
Qty: 30 | Refills: 0 | Status: DISCONTINUED | COMMUNITY
Start: 2020-12-08 | End: 2021-07-15

## 2021-07-15 RX ORDER — MELOXICAM 7.5 MG/1
7.5 TABLET ORAL
Qty: 30 | Refills: 0 | Status: DISCONTINUED | COMMUNITY
Start: 2021-02-26 | End: 2021-07-15

## 2021-07-15 NOTE — HISTORY OF PRESENT ILLNESS
[Heartburn] : denies heartburn [Nausea] : stable nausea [Vomiting] : stable vomiting [Diarrhea] : denies diarrhea [Constipation] : denies constipation [Yellow Skin Or Eyes (Jaundice)] : denies jaundice [Abdominal Pain] : stable abdominal pain [Abdominal Swelling] : abdominal swelling stable [Rectal Pain] : denies rectal pain [Test: ___] : [unfilled] [_________] : Performed [unfilled] [de-identified] : Redd presents to the office today for follow up with complaints of bloating.  He had received a letter from the office stating he was due for a procedure.  He was last seen in the office in December 2020.\par \par Since his last visit, he has been stable.  He continues to have bloating after eating and uses Gas-X.  He has felt better since he stopped drinking carbonated beverages.  He denies any obstructive symptoms, recent vomiting, or GI bleeding.\par \par PMH: He has type 1 DM for 30 years which is well controlled.  He had been diagnosed with gastroparesis in the past.  He had intestinal surgery in 2014 for a blockage caused by broccoli.  An EGD in June 2015 revealed atrophic gastritis with intestinal metaplasia and granulation tissue in the bulb.  On his colonoscopy in June 2015, a small tubular adenoma was removed.  On his EGD in March 2020, an ulcerated hyperplastic polyp was seen in the antrum, close to the pylorus.  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  On his colonoscopy, 2 small tubular adenomas were removed and biopsies were negative for microscopic colitis.  In July 2020, he went to the ER for nausea/vomiting and diarrhea and he underwent a CT A/P which showed no acute pathology.  He then underwent a NM Gastric Emptying study in August 2020 which was normal. [de-identified] : no acute pathology [de-identified] : atrophic gastritis with IM, ulcerated hyperplastic polyp [de-identified] : tubular adenoma [de-identified] : normal

## 2021-07-15 NOTE — PHYSICAL EXAM
[General Appearance - Alert] : alert [General Appearance - In No Acute Distress] : in no acute distress [General Appearance - Well Nourished] : well nourished [Sclera] : the sclera and conjunctiva were normal [PERRL With Normal Accommodation] : pupils were equal in size, round, and reactive to light [Extraocular Movements] : extraocular movements were intact [Outer Ear] : the ears and nose were normal in appearance [Hearing Threshold Finger Rub Not Duval] : hearing was normal [Both Tympanic Membranes Were Examined] : both tympanic membranes were normal [Neck Appearance] : the appearance of the neck was normal [Neck Cervical Mass (___cm)] : no neck mass was observed [Auscultation Breath Sounds / Voice Sounds] : lungs were clear to auscultation bilaterally [Heart Rate And Rhythm] : heart rate was normal and rhythm regular [Heart Sounds] : normal S1 and S2 [Edema] : there was no peripheral edema [Bowel Sounds] : normal bowel sounds [Abdomen Soft] : soft [Abdomen Tenderness] : non-tender [] : no hepato-splenomegaly [Abdomen Mass (___ Cm)] : no abdominal mass palpated [Abdomen Hernia] : no hernia was discovered [FreeTextEntry1] : umbilical scar [Cervical Lymph Nodes Enlarged Anterior Bilaterally] : anterior cervical [Supraclavicular Lymph Nodes Enlarged Bilaterally] : supraclavicular [No CVA Tenderness] : no ~M costovertebral angle tenderness [Abnormal Walk] : normal gait [Skin Color & Pigmentation] : normal skin color and pigmentation [Skin Turgor] : normal skin turgor [No Focal Deficits] : no focal deficits [Oriented To Time, Place, And Person] : oriented to person, place, and time [Impaired Insight] : insight and judgment were intact

## 2021-07-15 NOTE — ASSESSMENT
[FreeTextEntry1] : 1.  Episodic nausea, bloating, diarrhea.  CT Abd/Pel in July 2020 unremarkable.  NM Gastric Emptying Study in August 2020 normal.  Differential includes SIBO, diabetic enteropathy, partial SBO, functional GI disorder, intermittent gastric outlet obstruction from hyperplastic antral polyp.\par 2.  Autoimmune/atrophic gastritis with intestinal metaplasia seen on EGD in March 2020 and June 2015.\par 3.  Tubular adenoma and hemorrhoids on colonoscopy in March 2020 and June 2015.\par 4.  Status post abdominal surgery (small bowel) for obstructing food.\par 5.  Insulin-dependent diabetes mellitus with prior history of gastroparesis.\par 6.  Hypertension.\par 7.  Hypercholesterolemia.\par 8.  Hypothyroidism.\par 9.  History of iron deficiency anemia, secondary to atrophic gastritis.\par \par Plan:\par - Prior records reviewed.\par - Check B12, folate, iron levels, antibodies for autoimmune gastritis.\par - Patient was advised on dietary modifications with atrophic gastritis and possible implications for vitamin deficiencies.\par - Continue simethicone PRN.\par - If recurrent episodes of vomiting consider EGD in the hospital setting for removal of antral polyp.  If stable, consider surveillance EGD in 2023 given history of atrophic gastritis with neuroendocrine cell hyperplasia.\par - Repeat colonsocopy in 2025.

## 2021-11-15 NOTE — ED ADULT TRIAGE NOTE - PATIENT ON (OXYGEN DELIVERY METHOD)
What Type Of Note Output Would You Prefer (Optional)?: Bullet Format Is The Patient Presenting As Previously Scheduled?: Yes How Severe Is Your Rash?: moderate Is This A New Presentation, Or A Follow-Up?: Rash Additional History: Patient reports irritation after applying hair removal cream, states skin becomes red and dry. Patient uses Cetaphil cleansers and moisturizers. room air

## 2021-11-23 ENCOUNTER — APPOINTMENT (OUTPATIENT)
Dept: ORTHOPEDIC SURGERY | Facility: CLINIC | Age: 61
End: 2021-11-23

## 2021-12-03 ENCOUNTER — APPOINTMENT (OUTPATIENT)
Dept: ORTHOPEDIC SURGERY | Facility: CLINIC | Age: 61
End: 2021-12-03
Payer: COMMERCIAL

## 2021-12-03 VITALS
BODY MASS INDEX: 31.65 KG/M2 | DIASTOLIC BLOOD PRESSURE: 77 MMHG | WEIGHT: 190 LBS | SYSTOLIC BLOOD PRESSURE: 164 MMHG | HEART RATE: 92 BPM | HEIGHT: 65 IN

## 2021-12-03 DIAGNOSIS — M54.12 RADICULOPATHY, CERVICAL REGION: ICD-10-CM

## 2021-12-03 DIAGNOSIS — R20.2 ANESTHESIA OF SKIN: ICD-10-CM

## 2021-12-03 DIAGNOSIS — M75.42 IMPINGEMENT SYNDROME OF LEFT SHOULDER: ICD-10-CM

## 2021-12-03 DIAGNOSIS — R20.0 ANESTHESIA OF SKIN: ICD-10-CM

## 2021-12-03 PROCEDURE — 72040 X-RAY EXAM NECK SPINE 2-3 VW: CPT

## 2021-12-03 PROCEDURE — 20610 DRAIN/INJ JOINT/BURSA W/O US: CPT | Mod: LT

## 2021-12-03 PROCEDURE — 99214 OFFICE O/P EST MOD 30 MIN: CPT | Mod: 25

## 2021-12-03 NOTE — PROCEDURE
[de-identified] : Injection: Left  Shoulder Subacromial Space.\par Indication: Impingement.\par \par A discussion was had with the patient regarding this procedure and all questions were answered. All risks, benefits and alternatives were discussed. These included but were not limited to bleeding, infection, and allergic reaction.  A timeout was done to ensure correct side and pt agreed to the procedure.   Alcohol was used to clean the skin, and betadine was used to sterilize and prep the area in the posterior aspect of the shoulder. Ethyl chloride spray was then used as a topical anesthetic. A 22-gauge 1.5" needle was used to inject 2cc of 1% lidocaine without epinephrine and 1cc of 40mg/ml methylprednisolone into the subacromial space. A sterile bandage was then applied. The patient tolerated the procedure well and there were no complications.\par

## 2021-12-03 NOTE — HISTORY OF PRESENT ILLNESS
[de-identified] : Patient is here for left shoulder pain. Patient reports having worsening pain with tingling radiating down his left arm for about a week. There was no recent injury. He attended PT after his last appointment but completed his course. \par \par The patient's past medical history, past surgical history, medications and allergies were reviewed by me today and documented accordingly. In addition, the patient's family and social history, which were noncontributory to this visit, were reviewed also. Intake form was reviewed. The patient has no family history of arthritis.

## 2021-12-03 NOTE — PHYSICAL EXAM
[de-identified] : Constitutional: Well-nourished, well-developed, No acute distress\par Respiratory:  Good respiratory effort, no SOB\par Lymphatic: No regional lymphadenopathy, no lymphedema\par Psychiatric: Pleasant and normal affect, alert and oriented x3\par Skin: Clean dry and intact B/L UE/LE\par Musculoskeletal: normal except where as noted in regional exam\par \par Cervical Spine Exam\par APPEARANCE: no marked deformities or malalignment, normal curvature, good posture\par POSITIVE TENDERNESS: + Left upper trapezius, levator scapula, rhomboid major, and rhomboid minor, + spasm noted in the same muscles.\par NONTENDER: no bony midline tenderness.\par ROM: limited in all planes, most notably in flexion and sidebending due to pain\par RESISTIVE TESTING: painful 4/5 resisted ext, bilateral sidebending, rotation and shoulder shrug , 5/5 flexion \par SPECIAL TESTS: neg Spurling's b/l\par Vasc: 2+ radial pulse b/l\par Neuro: C5 - T1 intact to motor, DTRs 2+/4 biceps, triceps, brachioradialis\par Sensation: Intact to light touch throughout b/l UE\par \par Thoracic Spine Exam:\par \par normal curvature and normal alignment. good posture. no midline bony tenderness, + marked spasm and associated tenderness of left paraspinal and periscapular muscles.  ROM mildly limited in sidebending and rotation due to noted spasm\par \par Left Shoulder:\par APPEARANCE: no marked deformities, no swelling or malalignment\par POSITIVE TENDERNESS: supraspinatus, long head biceps tendon\par NONTENDER:  infraspinatus, teres minor. biceps. anterior and posterior capsule. AC joint. \par ROM: full with mild painful arc past 60 degrees, no scapular winging or dyskinesia present\par RESISTIVE TESTING: MMT 4+/5 ER, Flexion and Empty can, 5/5 IR. painless 5/5 resisted ext, horizontal abd/add \par SPECIAL TESTS: + Baker and Neers, mildly + cross arm adduction, + Speeds, neg Lyn's, neg Drop Arm, neg Apprehension. neg apley's scratch test\par  [de-identified] : The following radiographs were ordered and read by me during this patient's visit. I reviewed each radiograph in detail with the patient and discussed the findings as highlighted below. \par \par 2 views of the cervical spine were obtained today that show degenerative changes and evidence of moderate osteoarthritis in the C5-C6 and C6-C7 levels.  No fracture, or dislocation seen at this time. There is no malalignment. No other obvious osseous abnormality. Otherwise unremarkable.

## 2021-12-03 NOTE — DISCUSSION/SUMMARY
[de-identified] : Patient was seen today for reevaluation of left shoulder pain secondary to subacromial impingement and rotator cuff pathology.  He has significant relief of pain after cortisone injection provided last visit, however he has had recurrence of pain despite conservative measures with physical therapy.  We discussed various treatment options as well as associated risk/benefits/alternatives and patient elected to proceed with repeat cortisone injection today (see procedure note).  Informed the patient that the numbing medicine in today's injection will last for about 4-6 hours. The steroid that was injected will start to work in 1 to 2 days, peak at 1-2 weeks, and may last up to 1-2 months.  If patient has persisting pain despite these conservative measures would consider MRI as next step in treatment to evaluate for the extent of rotator cuff pathology.\par Patient is also developed some subsequent left-sided cervicothoracic and periscapular back pain and intermittent tingling of the left upper extremity.  Patient is advised this is likely due to compensatory muscle activation due to his poor rotator cuff function and subsequent exacerbation of underlying moderate osteoarthritis.  Patient started on a course of oral NSAIDs, prescription given for Mobic (We discussed all possible side effects of this medication).  Hopefully reduction of shoulder pain with today's cortisone injection and continued physical therapy will help alleviate this issue.\par Follow up as needed.  Patient appreciates and agrees with current plan.\par \par \par This note was generated using dragon medical dictation software.  A reasonable effort has been made for proofreading its contents, but typos may still remain.  If there are any questions or points of clarification needed please notify my office.\par

## 2022-12-23 ENCOUNTER — NON-APPOINTMENT (OUTPATIENT)
Age: 62
End: 2022-12-23

## 2023-06-25 ENCOUNTER — EMERGENCY (EMERGENCY)
Facility: HOSPITAL | Age: 63
LOS: 1 days | Discharge: HOME | End: 2023-06-25
Attending: STUDENT IN AN ORGANIZED HEALTH CARE EDUCATION/TRAINING PROGRAM
Payer: COMMERCIAL

## 2023-06-25 VITALS
SYSTOLIC BLOOD PRESSURE: 92 MMHG | OXYGEN SATURATION: 99 % | HEIGHT: 65 IN | DIASTOLIC BLOOD PRESSURE: 59 MMHG | RESPIRATION RATE: 18 BRPM | TEMPERATURE: 99 F | HEART RATE: 96 BPM | WEIGHT: 175.05 LBS

## 2023-06-25 DIAGNOSIS — K31.9 DISEASE OF STOMACH AND DUODENUM, UNSPECIFIED: Chronic | ICD-10-CM

## 2023-06-25 LAB
ALBUMIN SERPL ELPH-MCNC: 3.8 G/DL — SIGNIFICANT CHANGE UP (ref 3.3–5.2)
ALP SERPL-CCNC: 112 U/L — SIGNIFICANT CHANGE UP (ref 40–120)
ALT FLD-CCNC: 25 U/L — SIGNIFICANT CHANGE UP
ANION GAP SERPL CALC-SCNC: 14 MMOL/L — SIGNIFICANT CHANGE UP (ref 5–17)
APPEARANCE UR: CLEAR — SIGNIFICANT CHANGE UP
APTT BLD: 26.6 SEC — LOW (ref 27.5–35.5)
AST SERPL-CCNC: 25 U/L — SIGNIFICANT CHANGE UP
BASE EXCESS BLDV CALC-SCNC: -1.9 MMOL/L — SIGNIFICANT CHANGE UP (ref -2–3)
BASOPHILS # BLD AUTO: 0.08 K/UL — SIGNIFICANT CHANGE UP (ref 0–0.2)
BASOPHILS NFR BLD AUTO: 1.4 % — SIGNIFICANT CHANGE UP (ref 0–2)
BILIRUB SERPL-MCNC: 0.8 MG/DL — SIGNIFICANT CHANGE UP (ref 0.4–2)
BILIRUB UR-MCNC: NEGATIVE — SIGNIFICANT CHANGE UP
BUN SERPL-MCNC: 14.3 MG/DL — SIGNIFICANT CHANGE UP (ref 8–20)
CA-I SERPL-SCNC: 1.13 MMOL/L — LOW (ref 1.15–1.33)
CALCIUM SERPL-MCNC: 8.9 MG/DL — SIGNIFICANT CHANGE UP (ref 8.4–10.5)
CHLORIDE BLDV-SCNC: 95 MMOL/L — LOW (ref 96–108)
CHLORIDE SERPL-SCNC: 92 MMOL/L — LOW (ref 96–108)
CO2 SERPL-SCNC: 23 MMOL/L — SIGNIFICANT CHANGE UP (ref 22–29)
COLOR SPEC: YELLOW — SIGNIFICANT CHANGE UP
CREAT SERPL-MCNC: 1.1 MG/DL — SIGNIFICANT CHANGE UP (ref 0.5–1.3)
DIFF PNL FLD: NEGATIVE — SIGNIFICANT CHANGE UP
EGFR: 75 ML/MIN/1.73M2 — SIGNIFICANT CHANGE UP
EOSINOPHIL # BLD AUTO: 0.03 K/UL — SIGNIFICANT CHANGE UP (ref 0–0.5)
EOSINOPHIL NFR BLD AUTO: 0.5 % — SIGNIFICANT CHANGE UP (ref 0–6)
GAS PNL BLDV: 126 MMOL/L — LOW (ref 136–145)
GAS PNL BLDV: SIGNIFICANT CHANGE UP
GLUCOSE BLDV-MCNC: 268 MG/DL — HIGH (ref 70–99)
GLUCOSE SERPL-MCNC: 253 MG/DL — HIGH (ref 70–99)
GLUCOSE UR QL: 1000 MG/DL
HCO3 BLDV-SCNC: 24 MMOL/L — SIGNIFICANT CHANGE UP (ref 22–29)
HCT VFR BLD CALC: 38.2 % — LOW (ref 39–50)
HCT VFR BLDA CALC: 40 % — SIGNIFICANT CHANGE UP
HGB BLD CALC-MCNC: 13.3 G/DL — SIGNIFICANT CHANGE UP (ref 12.6–17.4)
HGB BLD-MCNC: 12.7 G/DL — LOW (ref 13–17)
IMM GRANULOCYTES NFR BLD AUTO: 1.4 % — HIGH (ref 0–0.9)
INR BLD: 1.11 RATIO — SIGNIFICANT CHANGE UP (ref 0.88–1.16)
KETONES UR-MCNC: ABNORMAL
LACTATE BLDV-MCNC: 2 MMOL/L — SIGNIFICANT CHANGE UP (ref 0.5–2)
LEUKOCYTE ESTERASE UR-ACNC: NEGATIVE — SIGNIFICANT CHANGE UP
LYMPHOCYTES # BLD AUTO: 0.57 K/UL — LOW (ref 1–3.3)
LYMPHOCYTES # BLD AUTO: 9.9 % — LOW (ref 13–44)
MAGNESIUM SERPL-MCNC: 1.8 MG/DL — SIGNIFICANT CHANGE UP (ref 1.6–2.6)
MCHC RBC-ENTMCNC: 27 PG — SIGNIFICANT CHANGE UP (ref 27–34)
MCHC RBC-ENTMCNC: 33.2 GM/DL — SIGNIFICANT CHANGE UP (ref 32–36)
MCV RBC AUTO: 81.1 FL — SIGNIFICANT CHANGE UP (ref 80–100)
MONOCYTES # BLD AUTO: 0.15 K/UL — SIGNIFICANT CHANGE UP (ref 0–0.9)
MONOCYTES NFR BLD AUTO: 2.6 % — SIGNIFICANT CHANGE UP (ref 2–14)
NEUTROPHILS # BLD AUTO: 4.87 K/UL — SIGNIFICANT CHANGE UP (ref 1.8–7.4)
NEUTROPHILS NFR BLD AUTO: 84.2 % — HIGH (ref 43–77)
NITRITE UR-MCNC: NEGATIVE — SIGNIFICANT CHANGE UP
PCO2 BLDV: 47 MMHG — SIGNIFICANT CHANGE UP (ref 42–55)
PH BLDV: 7.32 — SIGNIFICANT CHANGE UP (ref 7.32–7.43)
PH UR: 6.5 — SIGNIFICANT CHANGE UP (ref 5–8)
PHOSPHATE SERPL-MCNC: 2.6 MG/DL — SIGNIFICANT CHANGE UP (ref 2.4–4.7)
PLATELET # BLD AUTO: 220 K/UL — SIGNIFICANT CHANGE UP (ref 150–400)
PO2 BLDV: 51 MMHG — HIGH (ref 25–45)
POTASSIUM BLDV-SCNC: 4.1 MMOL/L — SIGNIFICANT CHANGE UP (ref 3.5–5.1)
POTASSIUM SERPL-MCNC: 4.2 MMOL/L — SIGNIFICANT CHANGE UP (ref 3.5–5.3)
POTASSIUM SERPL-SCNC: 4.2 MMOL/L — SIGNIFICANT CHANGE UP (ref 3.5–5.3)
PROT SERPL-MCNC: 7 G/DL — SIGNIFICANT CHANGE UP (ref 6.6–8.7)
PROT UR-MCNC: NEGATIVE — SIGNIFICANT CHANGE UP
PROTHROM AB SERPL-ACNC: 12.9 SEC — SIGNIFICANT CHANGE UP (ref 10.5–13.4)
RAPID RVP RESULT: SIGNIFICANT CHANGE UP
RBC # BLD: 4.71 M/UL — SIGNIFICANT CHANGE UP (ref 4.2–5.8)
RBC # FLD: 14.6 % — HIGH (ref 10.3–14.5)
RBC CASTS # UR COMP ASSIST: NEGATIVE /HPF — SIGNIFICANT CHANGE UP (ref 0–4)
SAO2 % BLDV: 80.4 % — SIGNIFICANT CHANGE UP
SARS-COV-2 RNA SPEC QL NAA+PROBE: SIGNIFICANT CHANGE UP
SODIUM SERPL-SCNC: 129 MMOL/L — LOW (ref 135–145)
SP GR SPEC: 1.01 — SIGNIFICANT CHANGE UP (ref 1.01–1.02)
UROBILINOGEN FLD QL: NEGATIVE MG/DL — SIGNIFICANT CHANGE UP
WBC # BLD: 5.78 K/UL — SIGNIFICANT CHANGE UP (ref 3.8–10.5)
WBC # FLD AUTO: 5.78 K/UL — SIGNIFICANT CHANGE UP (ref 3.8–10.5)
WBC UR QL: SIGNIFICANT CHANGE UP /HPF (ref 0–5)

## 2023-06-25 PROCEDURE — 99285 EMERGENCY DEPT VISIT HI MDM: CPT

## 2023-06-25 PROCEDURE — 93010 ELECTROCARDIOGRAM REPORT: CPT

## 2023-06-25 PROCEDURE — 71045 X-RAY EXAM CHEST 1 VIEW: CPT | Mod: 26

## 2023-06-25 RX ORDER — INSULIN LISPRO 100/ML
VIAL (ML) SUBCUTANEOUS
Refills: 0 | Status: DISCONTINUED | OUTPATIENT
Start: 2023-06-25 | End: 2023-07-03

## 2023-06-25 RX ORDER — DEXTROSE 50 % IN WATER 50 %
25 SYRINGE (ML) INTRAVENOUS ONCE
Refills: 0 | Status: DISCONTINUED | OUTPATIENT
Start: 2023-06-25 | End: 2023-07-03

## 2023-06-25 RX ORDER — CEFEPIME 1 G/1
1000 INJECTION, POWDER, FOR SOLUTION INTRAMUSCULAR; INTRAVENOUS ONCE
Refills: 0 | Status: COMPLETED | OUTPATIENT
Start: 2023-06-25 | End: 2023-06-25

## 2023-06-25 RX ORDER — METOCLOPRAMIDE HCL 10 MG
10 TABLET ORAL ONCE
Refills: 0 | Status: COMPLETED | OUTPATIENT
Start: 2023-06-25 | End: 2023-06-25

## 2023-06-25 RX ORDER — SODIUM CHLORIDE 9 MG/ML
1000 INJECTION, SOLUTION INTRAVENOUS
Refills: 0 | Status: DISCONTINUED | OUTPATIENT
Start: 2023-06-25 | End: 2023-07-03

## 2023-06-25 RX ORDER — CEFEPIME 1 G/1
1000 INJECTION, POWDER, FOR SOLUTION INTRAMUSCULAR; INTRAVENOUS ONCE
Refills: 0 | Status: DISCONTINUED | OUTPATIENT
Start: 2023-06-25 | End: 2023-06-25

## 2023-06-25 RX ORDER — DEXTROSE 50 % IN WATER 50 %
15 SYRINGE (ML) INTRAVENOUS ONCE
Refills: 0 | Status: DISCONTINUED | OUTPATIENT
Start: 2023-06-25 | End: 2023-07-03

## 2023-06-25 RX ORDER — FAMOTIDINE 10 MG/ML
20 INJECTION INTRAVENOUS ONCE
Refills: 0 | Status: COMPLETED | OUTPATIENT
Start: 2023-06-25 | End: 2023-06-25

## 2023-06-25 RX ORDER — SODIUM CHLORIDE 9 MG/ML
400 INJECTION INTRAMUSCULAR; INTRAVENOUS; SUBCUTANEOUS ONCE
Refills: 0 | Status: COMPLETED | OUTPATIENT
Start: 2023-06-25 | End: 2023-06-25

## 2023-06-25 RX ORDER — ONDANSETRON 8 MG/1
4 TABLET, FILM COATED ORAL ONCE
Refills: 0 | Status: COMPLETED | OUTPATIENT
Start: 2023-06-25 | End: 2023-06-25

## 2023-06-25 RX ORDER — VANCOMYCIN HCL 1 G
1000 VIAL (EA) INTRAVENOUS ONCE
Refills: 0 | Status: COMPLETED | OUTPATIENT
Start: 2023-06-25 | End: 2023-06-25

## 2023-06-25 RX ORDER — GLUCAGON INJECTION, SOLUTION 0.5 MG/.1ML
1 INJECTION, SOLUTION SUBCUTANEOUS ONCE
Refills: 0 | Status: DISCONTINUED | OUTPATIENT
Start: 2023-06-25 | End: 2023-07-03

## 2023-06-25 RX ORDER — DEXTROSE 50 % IN WATER 50 %
12.5 SYRINGE (ML) INTRAVENOUS ONCE
Refills: 0 | Status: DISCONTINUED | OUTPATIENT
Start: 2023-06-25 | End: 2023-07-03

## 2023-06-25 RX ORDER — SODIUM CHLORIDE 9 MG/ML
2000 INJECTION INTRAMUSCULAR; INTRAVENOUS; SUBCUTANEOUS ONCE
Refills: 0 | Status: COMPLETED | OUTPATIENT
Start: 2023-06-25 | End: 2023-06-25

## 2023-06-25 RX ADMIN — Medication 12: at 22:59

## 2023-06-25 RX ADMIN — Medication 250 MILLIGRAM(S): at 17:01

## 2023-06-25 RX ADMIN — Medication 0.5 MILLIGRAM(S): at 17:56

## 2023-06-25 RX ADMIN — Medication 10 MILLIGRAM(S): at 17:56

## 2023-06-25 RX ADMIN — ONDANSETRON 4 MILLIGRAM(S): 8 TABLET, FILM COATED ORAL at 16:23

## 2023-06-25 RX ADMIN — ONDANSETRON 4 MILLIGRAM(S): 8 TABLET, FILM COATED ORAL at 17:30

## 2023-06-25 RX ADMIN — CEFEPIME 1000 MILLIGRAM(S): 1 INJECTION, POWDER, FOR SOLUTION INTRAMUSCULAR; INTRAVENOUS at 16:40

## 2023-06-25 RX ADMIN — SODIUM CHLORIDE 400 MILLILITER(S): 9 INJECTION INTRAMUSCULAR; INTRAVENOUS; SUBCUTANEOUS at 17:02

## 2023-06-25 RX ADMIN — SODIUM CHLORIDE 2000 MILLILITER(S): 9 INJECTION INTRAMUSCULAR; INTRAVENOUS; SUBCUTANEOUS at 16:21

## 2023-06-25 RX ADMIN — FAMOTIDINE 20 MILLIGRAM(S): 10 INJECTION INTRAVENOUS at 17:56

## 2023-06-25 NOTE — ED ADULT TRIAGE NOTE - CHIEF COMPLAINT QUOTE
Pt A&Ox4, NAD. Pt BIBEMS with complaints of N/V, generalized weakness. Hx stomach CA. Last chemo Tues through right chest wall port. Breathing even and unlabored.

## 2023-06-25 NOTE — ED PROVIDER NOTE - OBJECTIVE STATEMENT
This 63-year-old male with past medical history of insulin-dependent diabetes, gastric adenocarcinoma, hypothyroidism, hyperlipidemia, hypertension presenting with nausea, vomiting, and weakness.  Patient states that he was diagnosed with stomach cancer 1 month ago, and is status post 3 treatments with chemotherapy.  His last treatment was on June 19, he received docetaxel, 5-FU, leucovorin, and oxaliplatin.  He was seen 2 days ago   At [facility] in  [place] for diarrhea and weakness, received 1 L of normal saline and was discharged home.  today, patient started to experience nausea, associated with 2 episodes of vomiting, generalized weakness.  He says that his fingersticks have been in the 200s.  He injects insulin at home.  The patient has a port through which he receives his treatments.

## 2023-06-25 NOTE — ED PROVIDER NOTE - NSFOLLOWUPCLINICS_GEN_ALL_ED_FT
[facility]  Hematology/Oncology  [address] [place] [address]  Phone: [phone]  Fax:

## 2023-06-25 NOTE — ED PROVIDER NOTE - CARE PLAN
Principal Discharge DX:	Nausea & vomiting  Secondary Diagnosis:	Nausea & vomiting   1 Principal Discharge DX:	Nausea & vomiting  Secondary Diagnosis:	Syncope  Secondary Diagnosis:	Hyperglycemia

## 2023-06-25 NOTE — ED PROVIDER NOTE - NSFOLLOWUPINSTRUCTIONS_ED_ALL_ED_FT
-Follow up with your oncologist   -Return with any new/worse/concerning symptoms     Nausea / Vomiting    Nausea is the feeling that you have to vomit. As nausea gets worse, it can lead to vomiting. Vomiting puts you at an increased risk for dehydration. Older adults and people with other diseases or a weak immune system are at higher risk for dehydration. Drink clear fluids in small but frequent amounts as tolerated. Eat bland, easy-to-digest foods in small amounts as tolerated.    SEEK IMMEDIATE MEDICAL CARE IF YOU HAVE ANY OF THE FOLLOWING SYMPTOMS: fever, inability to keep sufficient fluids down, black or bloody vomitus, black or bloody stools, lightheadedness/dizziness, chest pain, severe headache, rash, shortness of breath, cold or clammy skin, confusion, pain with urination, or any signs of dehydration.

## 2023-06-25 NOTE — ED PROVIDER NOTE - WR ORDER ID 1
Message  Patient called and cancelled his appointment for today with Dr Kurtis Jamison and indicated that he was getting surgery shortly and there was no reason for him to keep his appointment  I tired to reschedule the appointment but he indicated he didn't want to do that  I send a task to Joycelyn Smith to indicated such since patient is on treatment and she was going to talk to Dr Kurtis Jamison and see what steps to take  Signatures   Electronically signed by :  Gladis Wang, ; Oct 21 2016 11:15AM EST                       (Author)
[id number]

## 2023-06-25 NOTE — ED PROVIDER NOTE - PATIENT PORTAL LINK FT
You can access the FollowMyHealth Patient Portal offered by [facility] by registering at the following website: [email or web address]. By joining [facility]’s FollowMyHealth portal, you will also be able to view your health information using other applications (apps) compatible with our system.

## 2023-06-25 NOTE — ED PROVIDER NOTE - PHYSICAL EXAMINATION
Gen: NAD, AOx3  Head: NCAT  HEENT: oral mucosa dry, normal conjunctiva, oropharynx clear without exudate or erythema  Lung: CTAB, no respiratory distress, no wheezing, rales, rhonchi  CV: normal s1/s2, rrr, no murmurs, Normal perfusion, pulses 2+ throughout  Abd: soft, NTND  MSK: No edema, no visible deformities, full range of motion in all 4 extremities  Neuro: CN II-XII grossly intact, No focal neurologic deficits  Skin: No rash   Psych: normal affect

## 2023-06-25 NOTE — ED ADULT NURSE REASSESSMENT NOTE - NS ED NURSE REASSESS COMMENT FT1
received report from CC RN, pt moved from critical to B6L.  pt is aox4, reports improvement of symptoms.  IV intact.  no s/s acute distress noted.

## 2023-06-25 NOTE — ED PROVIDER NOTE - NSICDXPASTMEDICALHX_GEN_ALL_CORE_FT
PAST MEDICAL HISTORY:  DM (diabetes mellitus)     HLD (hyperlipidemia)     HTN (hypertension)     HTN (hypertension)     Hypothyroidism     IDDM (insulin dependent diabetes mellitus)

## 2023-06-25 NOTE — ED PROVIDER NOTE - CLINICAL SUMMARY MEDICAL DECISION MAKING FREE TEXT BOX
this is a 63-year-old male with past medical history of gastric adenocarcinoma presenting with nausea vomiting and weakness  approximately 1 week status post his last chemotherapy session.  His vitals were reviewed, his blood pressure is soft, and he is borderline tachycardic, concerning for hypovolemia.  On exam, he has dry oral mucosa, his abdomen is soft nontender nondistended.  I suspect that this patient is dehydrated, however the patient also has a history of diabetes and could possibly be in diabetic ketoacidosis.  Plan to obtain labs, give  IV fluids, nausea control, and reassess.   I spoke with the patient liaison at [facility], Mary ([phone]) who provided additional history regarding the patient's cancer treatment.  He follows with [place] [facility], his oncologist is Dr. Velez. this is a 63-year-old male with past medical history of gastric adenocarcinoma presenting with nausea vomiting and weakness  approximately 1 week status post his last chemotherapy session.  His vitals were reviewed, his blood pressure is soft, and he is borderline tachycardic, concerning for hypovolemia.  On exam, he has dry oral mucosa, his abdomen is soft nontender nondistended.  I suspect that this patient is dehydrated, however the patient also has a history of diabetes and could possibly be in diabetic ketoacidosis.  Plan to obtain labs, give  IV fluids, nausea control, and reassess.   I spoke with the patient liaison at [facility], Mary ([phone]) who provided additional history regarding the patient's cancer treatment.  He follows with [place] [facility], his oncologist is Dr. Velez.    Tanya Roman [place]  @[id number]: After an attempt to access the patient's port, he became diaphoretic, nauseous, and developed seizure-like activity versus syncope.  A fingerstick was obtained and was in the 270s, he had an EKG which showed normal sinus rhythm without signs of ischemia.  His blood pressure was transiently low, 80s over 40s, with improvement after IV hydration.  His heart rate at the time was 49.   With these vital signs, I suspect that the patient may have had a vasovagal syncopal episode.  a rectal temperature was obtained and was 100.1.  Cultures were sent, the patient was empirically treated with antibiotics.   The patient is now complaining of diffuse abdominal pain associated with nausea.  He was given Zofran x2, Reglan, Ativan, and morphine.  He is pending a CT of his head, chest, and abdomen and pelvis.

## 2023-06-25 NOTE — ED PROVIDER NOTE - PROGRESS NOTE DETAILS
Greg: rechecked the patient. he is feeling much better. updated him that CT results are pending. He is not feeling nauseous right now. HR 110s. Greg: rechecked the patient. he is feeling much better. updated him that CT results are pending. He is not feeling nauseous right now. Greg: pt tolerating PO (ice water) and feels better. will dc Navjot: Pt received in signout from Dr. Owens. No acute findings on imaging, no signs of infection. Pt feeling much better and tolerating PO. Says he wants to go home. Hyperglycemia improved with SQ insulin. Pt advised of need for close f/u with his doctors and compliance with home meds. Pt agreeable to plan and verbalizes understanding. Return precautions discussed. Stable for discharge.

## 2023-06-26 VITALS
SYSTOLIC BLOOD PRESSURE: 121 MMHG | HEART RATE: 87 BPM | DIASTOLIC BLOOD PRESSURE: 71 MMHG | OXYGEN SATURATION: 97 % | RESPIRATION RATE: 18 BRPM | TEMPERATURE: 99 F

## 2023-06-26 LAB
CULTURE RESULTS: NO GROWTH — SIGNIFICANT CHANGE UP
SPECIMEN SOURCE: SIGNIFICANT CHANGE UP

## 2023-06-26 PROCEDURE — 85025 COMPLETE CBC W/AUTO DIFF WBC: CPT

## 2023-06-26 PROCEDURE — 96374 THER/PROPH/DIAG INJ IV PUSH: CPT | Mod: XU

## 2023-06-26 PROCEDURE — 83605 ASSAY OF LACTIC ACID: CPT

## 2023-06-26 PROCEDURE — 83735 ASSAY OF MAGNESIUM: CPT

## 2023-06-26 PROCEDURE — 71275 CT ANGIOGRAPHY CHEST: CPT | Mod: 26,MG

## 2023-06-26 PROCEDURE — 84100 ASSAY OF PHOSPHORUS: CPT

## 2023-06-26 PROCEDURE — 96376 TX/PRO/DX INJ SAME DRUG ADON: CPT

## 2023-06-26 PROCEDURE — 71045 X-RAY EXAM CHEST 1 VIEW: CPT

## 2023-06-26 PROCEDURE — 71275 CT ANGIOGRAPHY CHEST: CPT | Mod: MG

## 2023-06-26 PROCEDURE — 82435 ASSAY OF BLOOD CHLORIDE: CPT

## 2023-06-26 PROCEDURE — 85014 HEMATOCRIT: CPT

## 2023-06-26 PROCEDURE — 99285 EMERGENCY DEPT VISIT HI MDM: CPT | Mod: 25

## 2023-06-26 PROCEDURE — 84484 ASSAY OF TROPONIN QUANT: CPT

## 2023-06-26 PROCEDURE — 74177 CT ABD & PELVIS W/CONTRAST: CPT | Mod: 26,MG

## 2023-06-26 PROCEDURE — 0225U NFCT DS DNA&RNA 21 SARSCOV2: CPT

## 2023-06-26 PROCEDURE — 85610 PROTHROMBIN TIME: CPT

## 2023-06-26 PROCEDURE — 84295 ASSAY OF SERUM SODIUM: CPT

## 2023-06-26 PROCEDURE — G1004: CPT

## 2023-06-26 PROCEDURE — 87086 URINE CULTURE/COLONY COUNT: CPT

## 2023-06-26 PROCEDURE — 87040 BLOOD CULTURE FOR BACTERIA: CPT

## 2023-06-26 PROCEDURE — 36415 COLL VENOUS BLD VENIPUNCTURE: CPT

## 2023-06-26 PROCEDURE — 85730 THROMBOPLASTIN TIME PARTIAL: CPT

## 2023-06-26 PROCEDURE — 70450 CT HEAD/BRAIN W/O DYE: CPT | Mod: 26,MG

## 2023-06-26 PROCEDURE — 82947 ASSAY GLUCOSE BLOOD QUANT: CPT

## 2023-06-26 PROCEDURE — 93005 ELECTROCARDIOGRAM TRACING: CPT

## 2023-06-26 PROCEDURE — 74177 CT ABD & PELVIS W/CONTRAST: CPT | Mod: MG

## 2023-06-26 PROCEDURE — 82803 BLOOD GASES ANY COMBINATION: CPT

## 2023-06-26 PROCEDURE — 82962 GLUCOSE BLOOD TEST: CPT

## 2023-06-26 PROCEDURE — 85018 HEMOGLOBIN: CPT

## 2023-06-26 PROCEDURE — 82330 ASSAY OF CALCIUM: CPT

## 2023-06-26 PROCEDURE — 96375 TX/PRO/DX INJ NEW DRUG ADDON: CPT

## 2023-06-26 PROCEDURE — 80053 COMPREHEN METABOLIC PANEL: CPT

## 2023-06-26 PROCEDURE — 84132 ASSAY OF SERUM POTASSIUM: CPT

## 2023-06-26 PROCEDURE — 81001 URINALYSIS AUTO W/SCOPE: CPT

## 2023-06-26 PROCEDURE — 83880 ASSAY OF NATRIURETIC PEPTIDE: CPT

## 2023-06-26 PROCEDURE — 70450 CT HEAD/BRAIN W/O DYE: CPT | Mod: MG

## 2023-06-27 ENCOUNTER — INPATIENT (INPATIENT)
Facility: HOSPITAL | Age: 63
LOS: 2 days | Discharge: HOME | DRG: 640 | End: 2023-06-30
Attending: HOSPITALIST | Admitting: STUDENT IN AN ORGANIZED HEALTH CARE EDUCATION/TRAINING PROGRAM
Payer: COMMERCIAL

## 2023-06-27 VITALS
HEIGHT: 65 IN | SYSTOLIC BLOOD PRESSURE: 96 MMHG | DIASTOLIC BLOOD PRESSURE: 60 MMHG | TEMPERATURE: 99 F | OXYGEN SATURATION: 95 % | HEART RATE: 76 BPM | RESPIRATION RATE: 14 BRPM

## 2023-06-27 DIAGNOSIS — R55 SYNCOPE AND COLLAPSE: ICD-10-CM

## 2023-06-27 DIAGNOSIS — K31.9 DISEASE OF STOMACH AND DUODENUM, UNSPECIFIED: Chronic | ICD-10-CM

## 2023-06-27 LAB
ALBUMIN SERPL ELPH-MCNC: 3.3 G/DL — SIGNIFICANT CHANGE UP (ref 3.3–5.2)
ALP SERPL-CCNC: 90 U/L — SIGNIFICANT CHANGE UP (ref 40–120)
ALT FLD-CCNC: 30 U/L — SIGNIFICANT CHANGE UP
ANION GAP SERPL CALC-SCNC: 13 MMOL/L — SIGNIFICANT CHANGE UP (ref 5–17)
ANISOCYTOSIS BLD QL: SLIGHT — SIGNIFICANT CHANGE UP
APPEARANCE UR: CLEAR — SIGNIFICANT CHANGE UP
AST SERPL-CCNC: 39 U/L — SIGNIFICANT CHANGE UP
BACTERIA # UR AUTO: ABNORMAL
BASOPHILS # BLD AUTO: 0 K/UL — SIGNIFICANT CHANGE UP (ref 0–0.2)
BASOPHILS NFR BLD AUTO: 0 % — SIGNIFICANT CHANGE UP (ref 0–2)
BILIRUB SERPL-MCNC: 0.5 MG/DL — SIGNIFICANT CHANGE UP (ref 0.4–2)
BILIRUB UR-MCNC: NEGATIVE — SIGNIFICANT CHANGE UP
BUN SERPL-MCNC: 11.5 MG/DL — SIGNIFICANT CHANGE UP (ref 8–20)
BURR CELLS BLD QL SMEAR: PRESENT — SIGNIFICANT CHANGE UP
CALCIUM SERPL-MCNC: 8.7 MG/DL — SIGNIFICANT CHANGE UP (ref 8.4–10.5)
CHLORIDE SERPL-SCNC: 96 MMOL/L — SIGNIFICANT CHANGE UP (ref 96–108)
CO2 SERPL-SCNC: 22 MMOL/L — SIGNIFICANT CHANGE UP (ref 22–29)
COLOR SPEC: YELLOW — SIGNIFICANT CHANGE UP
CREAT SERPL-MCNC: 0.9 MG/DL — SIGNIFICANT CHANGE UP (ref 0.5–1.3)
DIFF PNL FLD: ABNORMAL
EGFR: 96 ML/MIN/1.73M2 — SIGNIFICANT CHANGE UP
ELLIPTOCYTES BLD QL SMEAR: SLIGHT — SIGNIFICANT CHANGE UP
EOSINOPHIL # BLD AUTO: 0 K/UL — SIGNIFICANT CHANGE UP (ref 0–0.5)
EOSINOPHIL NFR BLD AUTO: 0 % — SIGNIFICANT CHANGE UP (ref 0–6)
EPI CELLS # UR: SIGNIFICANT CHANGE UP
GIANT PLATELETS BLD QL SMEAR: PRESENT — SIGNIFICANT CHANGE UP
GLUCOSE SERPL-MCNC: 136 MG/DL — HIGH (ref 70–99)
GLUCOSE UR QL: 50 MG/DL
HCT VFR BLD CALC: 33 % — LOW (ref 39–50)
HGB BLD-MCNC: 11.5 G/DL — LOW (ref 13–17)
KETONES UR-MCNC: ABNORMAL
LACTATE BLDV-MCNC: 1.4 MMOL/L — SIGNIFICANT CHANGE UP (ref 0.5–2)
LEUKOCYTE ESTERASE UR-ACNC: NEGATIVE — SIGNIFICANT CHANGE UP
LIDOCAIN IGE QN: 40 U/L — SIGNIFICANT CHANGE UP (ref 22–51)
LYMPHOCYTES # BLD AUTO: 0.29 K/UL — LOW (ref 1–3.3)
LYMPHOCYTES # BLD AUTO: 12.2 % — LOW (ref 13–44)
MANUAL SMEAR VERIFICATION: SIGNIFICANT CHANGE UP
MCHC RBC-ENTMCNC: 27.2 PG — SIGNIFICANT CHANGE UP (ref 27–34)
MCHC RBC-ENTMCNC: 34.8 GM/DL — SIGNIFICANT CHANGE UP (ref 32–36)
MCV RBC AUTO: 78 FL — LOW (ref 80–100)
MONOCYTES # BLD AUTO: 0.23 K/UL — SIGNIFICANT CHANGE UP (ref 0–0.9)
MONOCYTES NFR BLD AUTO: 9.5 % — SIGNIFICANT CHANGE UP (ref 2–14)
NEUTROPHILS # BLD AUTO: 1.82 K/UL — SIGNIFICANT CHANGE UP (ref 1.8–7.4)
NEUTROPHILS NFR BLD AUTO: 72.2 % — SIGNIFICANT CHANGE UP (ref 43–77)
NEUTS BAND # BLD: 3.5 % — SIGNIFICANT CHANGE UP (ref 0–8)
NITRITE UR-MCNC: NEGATIVE — SIGNIFICANT CHANGE UP
NRBC # BLD: 1 /100 — HIGH (ref 0–0)
OVALOCYTES BLD QL SMEAR: SLIGHT — SIGNIFICANT CHANGE UP
PH UR: 7 — SIGNIFICANT CHANGE UP (ref 5–8)
PLAT MORPH BLD: NORMAL — SIGNIFICANT CHANGE UP
PLATELET # BLD AUTO: 161 K/UL — SIGNIFICANT CHANGE UP (ref 150–400)
POIKILOCYTOSIS BLD QL AUTO: SLIGHT — SIGNIFICANT CHANGE UP
POLYCHROMASIA BLD QL SMEAR: SLIGHT — SIGNIFICANT CHANGE UP
POTASSIUM SERPL-MCNC: 3.7 MMOL/L — SIGNIFICANT CHANGE UP (ref 3.5–5.3)
POTASSIUM SERPL-SCNC: 3.7 MMOL/L — SIGNIFICANT CHANGE UP (ref 3.5–5.3)
PROT SERPL-MCNC: 6 G/DL — LOW (ref 6.6–8.7)
PROT UR-MCNC: 15
RAPID RVP RESULT: SIGNIFICANT CHANGE UP
RBC # BLD: 4.23 M/UL — SIGNIFICANT CHANGE UP (ref 4.2–5.8)
RBC # FLD: 14.7 % — HIGH (ref 10.3–14.5)
RBC BLD AUTO: ABNORMAL
RBC CASTS # UR COMP ASSIST: SIGNIFICANT CHANGE UP /HPF (ref 0–4)
SARS-COV-2 RNA SPEC QL NAA+PROBE: SIGNIFICANT CHANGE UP
SODIUM SERPL-SCNC: 130 MMOL/L — LOW (ref 135–145)
SP GR SPEC: 1 — LOW (ref 1.01–1.02)
UROBILINOGEN FLD QL: NEGATIVE MG/DL — SIGNIFICANT CHANGE UP
VARIANT LYMPHS # BLD: 2.6 % — SIGNIFICANT CHANGE UP (ref 0–6)
WBC # BLD: 2.4 K/UL — LOW (ref 3.8–10.5)
WBC # FLD AUTO: 2.4 K/UL — LOW (ref 3.8–10.5)
WBC UR QL: SIGNIFICANT CHANGE UP /HPF (ref 0–5)

## 2023-06-27 PROCEDURE — 99222 1ST HOSP IP/OBS MODERATE 55: CPT

## 2023-06-27 PROCEDURE — 99285 EMERGENCY DEPT VISIT HI MDM: CPT

## 2023-06-27 PROCEDURE — 93010 ELECTROCARDIOGRAM REPORT: CPT

## 2023-06-27 RX ORDER — ONDANSETRON 8 MG/1
4 TABLET, FILM COATED ORAL EVERY 8 HOURS
Refills: 0 | Status: DISCONTINUED | OUTPATIENT
Start: 2023-06-27 | End: 2023-06-30

## 2023-06-27 RX ORDER — ACETAMINOPHEN 500 MG
650 TABLET ORAL EVERY 6 HOURS
Refills: 0 | Status: DISCONTINUED | OUTPATIENT
Start: 2023-06-27 | End: 2023-06-30

## 2023-06-27 RX ORDER — ONDANSETRON 8 MG/1
4 TABLET, FILM COATED ORAL ONCE
Refills: 0 | Status: COMPLETED | OUTPATIENT
Start: 2023-06-27 | End: 2023-06-27

## 2023-06-27 RX ORDER — VANCOMYCIN HCL 1 G
1000 VIAL (EA) INTRAVENOUS ONCE
Refills: 0 | Status: COMPLETED | OUTPATIENT
Start: 2023-06-27 | End: 2023-06-27

## 2023-06-27 RX ORDER — CEFEPIME 1 G/1
2000 INJECTION, POWDER, FOR SOLUTION INTRAMUSCULAR; INTRAVENOUS ONCE
Refills: 0 | Status: COMPLETED | OUTPATIENT
Start: 2023-06-27 | End: 2023-06-27

## 2023-06-27 RX ORDER — CEFEPIME 1 G/1
1000 INJECTION, POWDER, FOR SOLUTION INTRAMUSCULAR; INTRAVENOUS EVERY 12 HOURS
Refills: 0 | Status: DISCONTINUED | OUTPATIENT
Start: 2023-06-28 | End: 2023-06-30

## 2023-06-27 RX ORDER — SODIUM CHLORIDE 9 MG/ML
1000 INJECTION INTRAMUSCULAR; INTRAVENOUS; SUBCUTANEOUS ONCE
Refills: 0 | Status: COMPLETED | OUTPATIENT
Start: 2023-06-27 | End: 2023-06-27

## 2023-06-27 RX ORDER — LANOLIN ALCOHOL/MO/W.PET/CERES
3 CREAM (GRAM) TOPICAL AT BEDTIME
Refills: 0 | Status: DISCONTINUED | OUTPATIENT
Start: 2023-06-27 | End: 2023-06-30

## 2023-06-27 RX ADMIN — CEFEPIME 2000 MILLIGRAM(S): 1 INJECTION, POWDER, FOR SOLUTION INTRAMUSCULAR; INTRAVENOUS at 20:22

## 2023-06-27 RX ADMIN — ONDANSETRON 4 MILLIGRAM(S): 8 TABLET, FILM COATED ORAL at 18:20

## 2023-06-27 RX ADMIN — SODIUM CHLORIDE 1000 MILLILITER(S): 9 INJECTION INTRAMUSCULAR; INTRAVENOUS; SUBCUTANEOUS at 21:24

## 2023-06-27 RX ADMIN — Medication 250 MILLIGRAM(S): at 20:23

## 2023-06-27 RX ADMIN — ONDANSETRON 4 MILLIGRAM(S): 8 TABLET, FILM COATED ORAL at 20:45

## 2023-06-27 RX ADMIN — SODIUM CHLORIDE 1000 MILLILITER(S): 9 INJECTION INTRAMUSCULAR; INTRAVENOUS; SUBCUTANEOUS at 17:35

## 2023-06-27 NOTE — H&P ADULT - NSHPPHYSICALEXAM_GEN_ALL_CORE
Vital Signs Last 24 Hrs  T(C): 38.6 (27 Jun 2023 19:56), Max: 38.6 (27 Jun 2023 19:56)  T(F): 101.5 (27 Jun 2023 19:56), Max: 101.5 (27 Jun 2023 19:56)  HR: 87 (27 Jun 2023 19:29) (76 - 87)  BP: 133/64 (27 Jun 2023 19:29) (96/60 - 166/76)  BP(mean): --  RR: 18 (27 Jun 2023 19:29) (14 - 18)  SpO2: 98% (27 Jun 2023 19:29) (95% - 99%)    Parameters below as of 27 Jun 2023 19:29  Patient On (Oxygen Delivery Method): room air

## 2023-06-27 NOTE — H&P ADULT - HISTORY OF PRESENT ILLNESS
62 y/o male with PMH of DM-1, gastric adenocarcinoma on chemo, hypothyroidism, HTN, HLD came to the ED s/p syncope. Patient went to [facility] [place] [place] for IVF because he was dehydrated; while there he felt lightheaded and passed out.  He reported nausea and vomiting, minimal PO intake. Of note, he was seen in the ED yesterday for similar presentation. CT head, CT C/A/P done and reviewed. He said no head trauma. He has no chest pain, shortness of breath, palpitation, abdominal pain, change in bowel/urinary habit, cough, fever, chills.

## 2023-06-27 NOTE — ED ADULT NURSE NOTE - CHIEF COMPLAINT QUOTE
Pt BIBA s/p syncopal episode at [facility] lasting approximately 1 minute.  Pt had additional syncopal episode on arrival to ED lasting approximately 20 seconds.  Pt arrives lethargic c/o lightheaded and dizziness. Pt recently at [facility] 2 days ago for same complaints.  Last chemo 6/19

## 2023-06-27 NOTE — H&P ADULT - NSICDXFAMILYHX_GEN_ALL_CORE_FT
FAMILY HISTORY:  No pertinent family history in first degree relatives     FAMILY HISTORY:  Mother  Still living? Unknown  Family history of diabetes mellitus (DM), Age at diagnosis: Age Unknown

## 2023-06-27 NOTE — H&P ADULT - ASSESSMENT
62 y/o male with PMH of DM-1, gastric adenocarcinoma on chemo, hypothyroidism, HTN, HLD came to the ED s/p syncope. Patient went to [facility] for IVF because he was dehydrated; while there he felt lightheaded and passed out.  He reported nausea and vomiting, minimal PO intake. Of note, he was seen in the ED yesterday for similar presentation. CT head, CT C/A/P done and reviewed. He said no head trauma.   In the ED, he was leukopenic (WBC: 2.40), febrile 101.5. Antibiotic given.     SIRS   Admit to telemetry   WBC: 2.40  T-max: 101.5  UA done and result noted   Blood and urine culture sent, follow up   Will continue antibiotic empirically given immunocompromised   Tylenol PRN for fever     Syncope   Likely due to dehydration   Patient with nausea and vomiting   Fall precaution     Hyponatremia   Na: 130  Likely due to dehydration   IVF given in the ED  Monitor BMP     DM-1  On Insulin Glulisine 10units with meal   Toujeo 15 units HS   Insulin sliding scale     HTN/HLD   Atorvastatin 40mg   Will hold HCTZ 25mg in the setting of dehydration, restart as needed   Losartan 100mg     Hypothyroidism   Synthroid 50mcg     Gastric ca   On chemo therapy (last treatment was 06/19/23)  Follows with [facility]     Supportive   DVT prophylaxis: Lovenox   Diet: regular     Plan of care discussed with patient and nurse at bed side

## 2023-06-27 NOTE — ED ADULT NURSE NOTE - ED STAT RN HANDOFF DETAILS
handoff care to RN Candy. handoff care at this time. pt placed on telebox. pt in NAD. RR even and unlabored.

## 2023-06-27 NOTE — ED PROVIDER NOTE - CLINICAL SUMMARY MEDICAL DECISION MAKING FREE TEXT BOX
63-year-old male with past medical history of insulin-dependent diabetes, gastric adenocarcinoma, hypothyroidism, hyperlipidemia, hypertension presenting with syncope.  patient reports he was going to [facility] for ivf 2/2 dehydration. while at [facility], patient felt lightheadedness, nausea, diaphoresis and syncopized for few seconds.   found to be febrile to 101, hypotensive 90/50, tachycardic.  liklely sepsis. will do cultures and provide abx.

## 2023-06-27 NOTE — ED ADULT NURSE NOTE - OBJECTIVE STATEMENT
pt care assumed at 1740, no apparent distress noted at this time. pt received A&Ox4 resting in bed comfortably. pt c/o near syncopable episode while at [facility] today. pt states he feels lethargic at this time. wife states pt was sent for [place] for workup. wife states pt was at [facility] yesterday for the same reason. pt denies pain at this time. pt is NSR on cardiac monitor. lung sounds are clear b/l. pt has right chest wall port for chemo. pt awaiting blood work.

## 2023-06-27 NOTE — ED ADULT NURSE REASSESSMENT NOTE - NS ED NURSE REASSESS COMMENT FT1
at approx 2035 pt was lying in stretcher, pt vomited x 2. pt states he feels like his glucose is high. . pt reports feeling nauseous and having abdominal pain. pt then stood up and became weak. pt placed on chair and pt became unresponsive while in chair. pt transferred to bed. pt in ST on CM, + pulse noted. MD Castro at bedside. pt then became responsive. pt now A+O x4. RR even and unlabored. pt remains in ST on CM. pt states when he stood he felt diaphoretic and now returned to baseline.
Assumed care of pt at 19:15 as stated in report from MARBELLA Radford. Charting as noted. Patient A&O x4. denies pain/discomfort, denies CP/SOB. RR even and unlabored. Updated on the plan of care. Call bell within reach, bed locked in lowest position. IV site flushed w/ NS. No redness, swelling or pain noted to site. No signs of acute distress noted, safety maintained. Pt remains on CM in NSR.

## 2023-06-27 NOTE — ED PROVIDER NOTE - NS ED ROS FT
Constitutional: (-) fever  (-)chills  (-)sweats  Eyes/ENT: (-)   Cardiovascular: (-) chest pain, (-) palpitations (-) edema   Respiratory: (-) cough, (-) shortness of breath   Gastrointestinal: (+)nausea  (+)vomiting, (-) diarrhea  (-) abdominal pain   :  (-)dysuria, (-)frequency, (-)urgency, (-)hematuria  Musculoskeletal: (-) neck pain, (-) back pain, (-) joint pain  Integumentary: (-) rash, (-) edema  Neurological: (-) headache, (-) altered mental status  (+)LOC

## 2023-06-27 NOTE — ED PROVIDER NOTE - OBJECTIVE STATEMENT
63-year-old male with past medical history of insulin-dependent diabetes, gastric adenocarcinoma, hypothyroidism, hyperlipidemia, hypertension presenting with syncope.  patient reports he was going to [facility] [place] [facility] for ivf 2/2 dehydration. while at [facility] [place], patient felt lightheadedness, nausea, diaphoresis and syncopized for few seconds.  denies cp/sob/palp. denies headache.  reports having nausea and vomiting with minimal PO intake over the past week.  c/o chills/rigors. denies fever.  denies sick contacts. was in ED yesterday for similar symptoms and received ct head and cta to eval for pe--both were negative.  PMH: DM, Gastric Ca, Hypothyroidism,  HLD, HTN  SOCIAL: denies smoking

## 2023-06-27 NOTE — ED ADULT TRIAGE NOTE - CHIEF COMPLAINT QUOTE
Pt BIBA s/p syncopal episode at [facility] lasting approximately 1 minute.  Pt had additional syncopal episode on arrival to ED lasting approximately 20 seconds.  Pt arrives lethargic c/o lightheaded and dizziness. Pt recently at [facility] 2 days ago for same complaints.  Last chemo 6/19

## 2023-06-27 NOTE — ED PROVIDER NOTE - PHYSICAL EXAMINATION
Gen: Alert, NAD  Head: NC, AT, PERRL, EOMI, normal lids/conjunctiva  Neck: +supple, no tenderness/meningismus/JVD, +Trachea midline  Pulm: Bilateral BS, normal resp effort, no wheeze/stridor/retractions  CV: RRR, no M/R/G, 2+dist pulses  Abd: soft, NT/ND, +BS, no hepatosplenomegaly  Mskel: ROM intact x4 extremities.  no edema/erythema/cyanosis  Neuro: AAOx3, no sensory/motor deficits, CN 2-12 intact., NIHSS=0

## 2023-06-28 LAB
A1C WITH ESTIMATED AVERAGE GLUCOSE RESULT: 7.4 % — HIGH (ref 4–5.6)
ACETONE SERPL-MCNC: ABNORMAL
ALBUMIN SERPL ELPH-MCNC: 3.2 G/DL — LOW (ref 3.3–5.2)
ALP SERPL-CCNC: 88 U/L — SIGNIFICANT CHANGE UP (ref 40–120)
ALT FLD-CCNC: 31 U/L — SIGNIFICANT CHANGE UP
ANION GAP SERPL CALC-SCNC: 11 MMOL/L — SIGNIFICANT CHANGE UP (ref 5–17)
ANION GAP SERPL CALC-SCNC: 22 MMOL/L — HIGH (ref 5–17)
AST SERPL-CCNC: 34 U/L — SIGNIFICANT CHANGE UP
BILIRUB SERPL-MCNC: 0.3 MG/DL — LOW (ref 0.4–2)
BUN SERPL-MCNC: 14.4 MG/DL — SIGNIFICANT CHANGE UP (ref 8–20)
BUN SERPL-MCNC: 16.1 MG/DL — SIGNIFICANT CHANGE UP (ref 8–20)
CALCIUM SERPL-MCNC: 8 MG/DL — LOW (ref 8.4–10.5)
CALCIUM SERPL-MCNC: 8.3 MG/DL — LOW (ref 8.4–10.5)
CHLORIDE SERPL-SCNC: 100 MMOL/L — SIGNIFICANT CHANGE UP (ref 96–108)
CHLORIDE SERPL-SCNC: 92 MMOL/L — LOW (ref 96–108)
CO2 SERPL-SCNC: 13 MMOL/L — LOW (ref 22–29)
CO2 SERPL-SCNC: 19 MMOL/L — LOW (ref 22–29)
CREAT SERPL-MCNC: 0.91 MG/DL — SIGNIFICANT CHANGE UP (ref 0.5–1.3)
CREAT SERPL-MCNC: 1.06 MG/DL — SIGNIFICANT CHANGE UP (ref 0.5–1.3)
CULTURE RESULTS: SIGNIFICANT CHANGE UP
EGFR: 79 ML/MIN/1.73M2 — SIGNIFICANT CHANGE UP
EGFR: 95 ML/MIN/1.73M2 — SIGNIFICANT CHANGE UP
ESTIMATED AVERAGE GLUCOSE: 166 MG/DL — HIGH (ref 68–114)
GLUCOSE BLDC GLUCOMTR-MCNC: 198 MG/DL — HIGH (ref 70–99)
GLUCOSE BLDC GLUCOMTR-MCNC: 215 MG/DL — HIGH (ref 70–99)
GLUCOSE BLDC GLUCOMTR-MCNC: 319 MG/DL — HIGH (ref 70–99)
GLUCOSE BLDC GLUCOMTR-MCNC: 320 MG/DL — HIGH (ref 70–99)
GLUCOSE BLDC GLUCOMTR-MCNC: 484 MG/DL — CRITICAL HIGH (ref 70–99)
GLUCOSE SERPL-MCNC: 185 MG/DL — HIGH (ref 70–99)
GLUCOSE SERPL-MCNC: 470 MG/DL — CRITICAL HIGH (ref 70–99)
HCT VFR BLD CALC: 31.5 % — LOW (ref 39–50)
HCV AB S/CO SERPL IA: 0.09 S/CO — SIGNIFICANT CHANGE UP (ref 0–0.99)
HCV AB SERPL-IMP: SIGNIFICANT CHANGE UP
HGB BLD-MCNC: 10.6 G/DL — LOW (ref 13–17)
MCHC RBC-ENTMCNC: 27.1 PG — SIGNIFICANT CHANGE UP (ref 27–34)
MCHC RBC-ENTMCNC: 33.7 GM/DL — SIGNIFICANT CHANGE UP (ref 32–36)
MCV RBC AUTO: 80.6 FL — SIGNIFICANT CHANGE UP (ref 80–100)
OSMOLALITY SERPL: 296 MOSMOL/KG — SIGNIFICANT CHANGE UP (ref 280–301)
PLATELET # BLD AUTO: 140 K/UL — LOW (ref 150–400)
POTASSIUM SERPL-MCNC: 3.3 MMOL/L — LOW (ref 3.5–5.3)
POTASSIUM SERPL-MCNC: 4.7 MMOL/L — SIGNIFICANT CHANGE UP (ref 3.5–5.3)
POTASSIUM SERPL-SCNC: 3.3 MMOL/L — LOW (ref 3.5–5.3)
POTASSIUM SERPL-SCNC: 4.7 MMOL/L — SIGNIFICANT CHANGE UP (ref 3.5–5.3)
PROT SERPL-MCNC: 5.8 G/DL — LOW (ref 6.6–8.7)
RBC # BLD: 3.91 M/UL — LOW (ref 4.2–5.8)
RBC # FLD: 15.1 % — HIGH (ref 10.3–14.5)
SODIUM SERPL-SCNC: 127 MMOL/L — LOW (ref 135–145)
SODIUM SERPL-SCNC: 130 MMOL/L — LOW (ref 135–145)
SODIUM SERPL-SCNC: 130 MMOL/L — LOW (ref 135–145)
SPECIMEN SOURCE: SIGNIFICANT CHANGE UP
TROPONIN T SERPL-MCNC: <0.01 NG/ML — SIGNIFICANT CHANGE UP (ref 0–0.06)
WBC # BLD: 1.67 K/UL — LOW (ref 3.8–10.5)
WBC # FLD AUTO: 1.67 K/UL — LOW (ref 3.8–10.5)

## 2023-06-28 PROCEDURE — 99233 SBSQ HOSP IP/OBS HIGH 50: CPT

## 2023-06-28 RX ORDER — ATORVASTATIN CALCIUM 80 MG/1
40 TABLET, FILM COATED ORAL AT BEDTIME
Refills: 0 | Status: DISCONTINUED | OUTPATIENT
Start: 2023-06-28 | End: 2023-06-30

## 2023-06-28 RX ORDER — PANTOPRAZOLE SODIUM 20 MG/1
40 TABLET, DELAYED RELEASE ORAL
Refills: 0 | Status: DISCONTINUED | OUTPATIENT
Start: 2023-06-28 | End: 2023-06-30

## 2023-06-28 RX ORDER — INSULIN LISPRO 100/ML
10 VIAL (ML) SUBCUTANEOUS ONCE
Refills: 0 | Status: COMPLETED | OUTPATIENT
Start: 2023-06-28 | End: 2023-06-28

## 2023-06-28 RX ORDER — SODIUM CHLORIDE 9 MG/ML
1000 INJECTION, SOLUTION INTRAVENOUS
Refills: 0 | Status: DISCONTINUED | OUTPATIENT
Start: 2023-06-28 | End: 2023-06-29

## 2023-06-28 RX ORDER — CHLORHEXIDINE GLUCONATE 213 G/1000ML
1 SOLUTION TOPICAL
Refills: 0 | Status: DISCONTINUED | OUTPATIENT
Start: 2023-06-28 | End: 2023-06-30

## 2023-06-28 RX ORDER — INSULIN LISPRO 100/ML
VIAL (ML) SUBCUTANEOUS
Refills: 0 | Status: DISCONTINUED | OUTPATIENT
Start: 2023-06-28 | End: 2023-06-30

## 2023-06-28 RX ORDER — LOSARTAN POTASSIUM 100 MG/1
100 TABLET, FILM COATED ORAL DAILY
Refills: 0 | Status: DISCONTINUED | OUTPATIENT
Start: 2023-06-28 | End: 2023-06-30

## 2023-06-28 RX ORDER — ENOXAPARIN SODIUM 100 MG/ML
40 INJECTION SUBCUTANEOUS EVERY 24 HOURS
Refills: 0 | Status: DISCONTINUED | OUTPATIENT
Start: 2023-06-28 | End: 2023-06-30

## 2023-06-28 RX ORDER — DEXTROSE 50 % IN WATER 50 %
15 SYRINGE (ML) INTRAVENOUS ONCE
Refills: 0 | Status: DISCONTINUED | OUTPATIENT
Start: 2023-06-28 | End: 2023-06-30

## 2023-06-28 RX ORDER — POTASSIUM CHLORIDE 20 MEQ
40 PACKET (EA) ORAL EVERY 4 HOURS
Refills: 0 | Status: COMPLETED | OUTPATIENT
Start: 2023-06-28 | End: 2023-06-28

## 2023-06-28 RX ORDER — DEXTROSE 50 % IN WATER 50 %
25 SYRINGE (ML) INTRAVENOUS ONCE
Refills: 0 | Status: DISCONTINUED | OUTPATIENT
Start: 2023-06-28 | End: 2023-06-30

## 2023-06-28 RX ORDER — GLUCAGON INJECTION, SOLUTION 0.5 MG/.1ML
1 INJECTION, SOLUTION SUBCUTANEOUS ONCE
Refills: 0 | Status: DISCONTINUED | OUTPATIENT
Start: 2023-06-28 | End: 2023-06-30

## 2023-06-28 RX ORDER — DEXTROSE 50 % IN WATER 50 %
12.5 SYRINGE (ML) INTRAVENOUS ONCE
Refills: 0 | Status: DISCONTINUED | OUTPATIENT
Start: 2023-06-28 | End: 2023-06-30

## 2023-06-28 RX ORDER — INSULIN LISPRO 100/ML
VIAL (ML) SUBCUTANEOUS AT BEDTIME
Refills: 0 | Status: DISCONTINUED | OUTPATIENT
Start: 2023-06-28 | End: 2023-06-30

## 2023-06-28 RX ORDER — SODIUM CHLORIDE 9 MG/ML
1000 INJECTION, SOLUTION INTRAVENOUS
Refills: 0 | Status: DISCONTINUED | OUTPATIENT
Start: 2023-06-28 | End: 2023-06-30

## 2023-06-28 RX ORDER — LEVOTHYROXINE SODIUM 125 MCG
50 TABLET ORAL DAILY
Refills: 0 | Status: DISCONTINUED | OUTPATIENT
Start: 2023-06-28 | End: 2023-06-30

## 2023-06-28 RX ORDER — INSULIN GLARGINE 100 [IU]/ML
15 INJECTION, SOLUTION SUBCUTANEOUS AT BEDTIME
Refills: 0 | Status: DISCONTINUED | OUTPATIENT
Start: 2023-06-28 | End: 2023-06-30

## 2023-06-28 RX ORDER — INSULIN LISPRO 100/ML
10 VIAL (ML) SUBCUTANEOUS
Refills: 0 | Status: DISCONTINUED | OUTPATIENT
Start: 2023-06-28 | End: 2023-06-30

## 2023-06-28 RX ADMIN — Medication 1: at 17:06

## 2023-06-28 RX ADMIN — LOSARTAN POTASSIUM 100 MILLIGRAM(S): 100 TABLET, FILM COATED ORAL at 05:36

## 2023-06-28 RX ADMIN — Medication 10 UNIT(S): at 11:33

## 2023-06-28 RX ADMIN — Medication 50 MICROGRAM(S): at 05:36

## 2023-06-28 RX ADMIN — Medication 10 UNIT(S): at 04:53

## 2023-06-28 RX ADMIN — CEFEPIME 1000 MILLIGRAM(S): 1 INJECTION, POWDER, FOR SOLUTION INTRAMUSCULAR; INTRAVENOUS at 08:09

## 2023-06-28 RX ADMIN — CEFEPIME 1000 MILLIGRAM(S): 1 INJECTION, POWDER, FOR SOLUTION INTRAMUSCULAR; INTRAVENOUS at 20:09

## 2023-06-28 RX ADMIN — ATORVASTATIN CALCIUM 40 MILLIGRAM(S): 80 TABLET, FILM COATED ORAL at 23:01

## 2023-06-28 RX ADMIN — SODIUM CHLORIDE 100 MILLILITER(S): 9 INJECTION, SOLUTION INTRAVENOUS at 20:13

## 2023-06-28 RX ADMIN — Medication 4: at 11:33

## 2023-06-28 RX ADMIN — PANTOPRAZOLE SODIUM 40 MILLIGRAM(S): 20 TABLET, DELAYED RELEASE ORAL at 05:36

## 2023-06-28 RX ADMIN — Medication 3 MILLIGRAM(S): at 00:16

## 2023-06-28 RX ADMIN — Medication 10 UNIT(S): at 17:06

## 2023-06-28 RX ADMIN — CHLORHEXIDINE GLUCONATE 1 APPLICATION(S): 213 SOLUTION TOPICAL at 11:37

## 2023-06-28 RX ADMIN — Medication 10 UNIT(S): at 08:09

## 2023-06-28 RX ADMIN — Medication 30 MILLILITER(S): at 23:00

## 2023-06-28 RX ADMIN — INSULIN GLARGINE 15 UNIT(S): 100 INJECTION, SOLUTION SUBCUTANEOUS at 23:13

## 2023-06-28 RX ADMIN — Medication 4: at 08:09

## 2023-06-28 RX ADMIN — Medication 40 MILLIEQUIVALENT(S): at 17:08

## 2023-06-28 RX ADMIN — SODIUM CHLORIDE 100 MILLILITER(S): 9 INJECTION, SOLUTION INTRAVENOUS at 11:33

## 2023-06-28 RX ADMIN — ENOXAPARIN SODIUM 40 MILLIGRAM(S): 100 INJECTION SUBCUTANEOUS at 05:36

## 2023-06-28 NOTE — PROGRESS NOTE ADULT - SUBJECTIVE AND OBJECTIVE BOX
CAROLANN GALEANO    [id number]    63y      Male    INTERVAL HPI/OVERNIGHT EVENTS: patient being seen for syncope in setting of active chemotherapy. Patient seen at bedside and still complains of weakness.       REVIEW OF SYSTEMS:    CONSTITUTIONAL: weakness  RESPIRATORY: No cough, wheezing, hemoptysis; No shortness of breath  CARDIOVASCULAR: No chest pain, palpitations  GASTROINTESTINAL: Nausea  NEUROLOGICAL: No headaches, memory loss, loss of strength.  MISCELLANEOUS:      Vital Signs Last 24 Hrs  T(C): 36.6 (28 Jun 2023 09:01), Max: 38.6 (27 Jun 2023 19:56)  T(F): 97.9 (28 Jun 2023 09:01), Max: 101.5 (27 Jun 2023 19:56)  HR: 78 (28 Jun 2023 09:01) (76 - 120)  BP: 127/69 (28 Jun 2023 09:01) (96/60 - 166/76)  BP(mean): --  RR: 18 (28 Jun 2023 09:01) (14 - 18)  SpO2: 100% (28 Jun 2023 09:01) (95% - 100%)    Parameters below as of 28 Jun 2023 09:01  Patient On (Oxygen Delivery Method): room air        PHYSICAL EXAM:  · Constitutional	well-groomed; no distress  · Eyes	PERRL; EOMI; conjunctiva clear  · Respiratory	clear to auscultation bilaterally; no wheezes; no respiratory distress; respirations non-labored  · Cardiovascular	regular rate and rhythm; S1 S2 present; no pedal edema  · Gastrointestinal	soft; nontender; nondistended; normal active bowel sounds  · Neurological	sensation intact; responds to verbal commands  · Motor	moving all extremities, no focal deficit  · Skin	warm and dry; color normal  · Skin Comments	chemo port on the right upper chest  · Musculoskeletal	ROM intact; no joint swelling; no joint erythema; no joint warmth; no calf tenderness  · Psychiatric	normal affect; alert and oriented x3; normal behavior        LABS:                        10.6   1.67  )-----------( 140      ( 28 Jun 2023 02:30 )             31.5     06-28    127<L>  |  92<L>  |  14.4  ----------------------------<  470<HH>  4.7   |  13.0<L>  |  1.06    Ca    8.0<L>      28 Jun 2023 02:30    TPro  6.0<L>  /  Alb  3.3  /  TBili  0.5  /  DBili  x   /  AST  39  /  ALT  30  /  AlkPhos  90  06-27      Urinalysis Basic - ( 28 Jun 2023 02:30 )    Color: x / Appearance: x / SG: x / pH: x  Gluc: 470 mg/dL / Ketone: x  / Bili: x / Urobili: x   Blood: x / Protein: x / Nitrite: x   Leuk Esterase: x / RBC: x / WBC x   Sq Epi: x / Non Sq Epi: x / Bacteria: x          MEDICATIONS  (STANDING):  atorvastatin 40 milliGRAM(s) Oral at bedtime  cefepime  Injectable. 1000 milliGRAM(s) IV Push every 12 hours  chlorhexidine 2% Cloths 1 Application(s) Topical <User Schedule>  dextrose 5%. 1000 milliLiter(s) (50 mL/Hr) IV Continuous <Continuous>  dextrose 5%. 1000 milliLiter(s) (100 mL/Hr) IV Continuous <Continuous>  dextrose 50% Injectable 25 Gram(s) IV Push once  dextrose 50% Injectable 12.5 Gram(s) IV Push once  dextrose 50% Injectable 25 Gram(s) IV Push once  enoxaparin Injectable 40 milliGRAM(s) SubCutaneous every 24 hours  glucagon  Injectable 1 milliGRAM(s) IntraMuscular once  insulin glargine Injectable (LANTUS) 15 Unit(s) SubCutaneous at bedtime  insulin lispro (ADMELOG) corrective regimen sliding scale   SubCutaneous three times a day before meals  insulin lispro (ADMELOG) corrective regimen sliding scale   SubCutaneous at bedtime  insulin lispro Injectable (ADMELOG) 10 Unit(s) SubCutaneous three times a day before meals  levothyroxine 50 MICROGram(s) Oral daily  losartan 100 milliGRAM(s) Oral daily  pantoprazole    Tablet 40 milliGRAM(s) Oral before breakfast  sodium chloride 0.9% 1000 milliLiter(s) (100 mL/Hr) IV Continuous <Continuous>    MEDICATIONS  (PRN):  acetaminophen     Tablet .. 650 milliGRAM(s) Oral every 6 hours PRN Temp greater or equal to 38C (100.4F), Mild Pain (1 - 3)  aluminum hydroxide/magnesium hydroxide/simethicone Suspension 30 milliLiter(s) Oral every 4 hours PRN Dyspepsia  dextrose Oral Gel 15 Gram(s) Oral once PRN Blood Glucose LESS THAN 70 milliGRAM(s)/deciliter  melatonin 3 milliGRAM(s) Oral at bedtime PRN Insomnia  ondansetron Injectable 4 milliGRAM(s) IV Push every 8 hours PRN Nausea and/or Vomiting      RADIOLOGY & ADDITIONAL TESTS:

## 2023-06-28 NOTE — CONSULT NOTE ADULT - SUBJECTIVE AND OBJECTIVE BOX
HPI: The pt is a 62 y/o male with PMH of DM, HTN + gastric adenocarcinoma (receiving chemotherapy) who presented s/p syncope. Patient noted poor PO intake, N/V and went to [facility] for IVF where he had syncopal episode.          (27 Jun 2023 22:14)      PAST MEDICAL & SURGICAL HISTORY:  IDDM (insulin dependent diabetes mellitus)      HTN (hypertension)      DM (diabetes mellitus)      Hypothyroidism      HTN (hypertension)      HLD (hyperlipidemia)      Lesion of stomach      No significant past surgical history          FAMILY HISTORY:  Family history of diabetes mellitus (DM) (Mother)        Social History:  No current tobacco; no etoh nor drugs      MEDICATIONS  (STANDING):  atorvastatin 40 milliGRAM(s) Oral at bedtime  cefepime  Injectable. 1000 milliGRAM(s) IV Push every 12 hours  chlorhexidine 2% Cloths 1 Application(s) Topical <User Schedule>  dextrose 5%. 1000 milliLiter(s) (50 mL/Hr) IV Continuous <Continuous>  dextrose 5%. 1000 milliLiter(s) (100 mL/Hr) IV Continuous <Continuous>  dextrose 50% Injectable 25 Gram(s) IV Push once  dextrose 50% Injectable 12.5 Gram(s) IV Push once  dextrose 50% Injectable 25 Gram(s) IV Push once  enoxaparin Injectable 40 milliGRAM(s) SubCutaneous every 24 hours  glucagon  Injectable 1 milliGRAM(s) IntraMuscular once  insulin glargine Injectable (LANTUS) 15 Unit(s) SubCutaneous at bedtime  insulin lispro (ADMELOG) corrective regimen sliding scale   SubCutaneous three times a day before meals  insulin lispro (ADMELOG) corrective regimen sliding scale   SubCutaneous at bedtime  insulin lispro Injectable (ADMELOG) 10 Unit(s) SubCutaneous three times a day before meals  levothyroxine 50 MICROGram(s) Oral daily  losartan 100 milliGRAM(s) Oral daily  pantoprazole    Tablet 40 milliGRAM(s) Oral before breakfast  sodium chloride 0.9% 1000 milliLiter(s) (100 mL/Hr) IV Continuous <Continuous>    MEDICATIONS  (PRN):  acetaminophen     Tablet .. 650 milliGRAM(s) Oral every 6 hours PRN Temp greater or equal to 38C (100.4F), Mild Pain (1 - 3)  aluminum hydroxide/magnesium hydroxide/simethicone Suspension 30 milliLiter(s) Oral every 4 hours PRN Dyspepsia  dextrose Oral Gel 15 Gram(s) Oral once PRN Blood Glucose LESS THAN 70 milliGRAM(s)/deciliter  melatonin 3 milliGRAM(s) Oral at bedtime PRN Insomnia  ondansetron Injectable 4 milliGRAM(s) IV Push every 8 hours PRN Nausea and/or Vomiting      Allergies    Seafood (Hives; Rash)  Altace (Hives)    Intolerances        REVIEW OF SYSTEMS:    CONSTITUTIONAL: No fever, weight loss, + fatigue  EYES: No eye pain, visual disturbances, or discharge  ENMT:  No difficulty hearing, tinnitus, vertigo; No sinus or throat pain  NECK: No pain or stiffness  BREASTS: No pain, masses, or nipple discharge  RESPIRATORY: No cough, wheezing, chills or hemoptysis; No shortness of breath  CARDIOVASCULAR: No chest pain, palpitations, dizziness, or leg swelling  GASTROINTESTINAL: No abdominal or epigastric pain. + nausea, vomitings; No diarrhea or constipation. No melena or hematochezia.  GENITOURINARY: No dysuria, frequency, hematuria, or incontinence  NEUROLOGICAL: No headaches, memory loss, loss of strength, numbness, or tremors  SKIN: No itching, burning, rashes, or lesions   LYMPH NODES: No enlarged glands  MUSCULOSKELETAL: No joint pain or swelling; No muscle, back, or extremity pain      Vital Signs Last 24 Hrs  T(C): 36.7 (28 Jun 2023 11:52), Max: 38.6 (27 Jun 2023 19:56)  T(F): 98 (28 Jun 2023 11:52), Max: 101.5 (27 Jun 2023 19:56)  HR: 84 (28 Jun 2023 11:52) (76 - 120)  BP: 123/73 (28 Jun 2023 11:52) (96/60 - 166/76)  BP(mean): --  RR: 18 (28 Jun 2023 11:52) (14 - 18)  SpO2: 100% (28 Jun 2023 11:52) (95% - 100%)    Parameters below as of 28 Jun 2023 11:52  Patient On (Oxygen Delivery Method): room air        PHYSICAL EXAM:  GENERAL: Debilitated  HEAD:  Atraumatic, Normocephalic  EYES: Conjunctiva and sclera clear  ENMT: Dry mucous membranes  NECK: Supple, No JVD  NERVOUS SYSTEM:  Alert & Oriented X3, intact and symmetric  CHEST/LUNG: Clear bilaterally  HEART: Regular rate and rhythm; No rub  ABDOMEN: Soft, Nontender, +BS  EXTREMITIES:  2+ Peripheral Pulses, No edema  SKIN: No rashes or lesions      LABS:                        10.6   1.67  )-----------( 140      ( 28 Jun 2023 02:30 )             31.5     06-28    127<L>  |  92<L>  |  14.4  ----------------------------<  470<HH>  4.7   |  13.0<L>  |  1.06        Ca    8.0<L>      28 Jun 2023 02:30    TPro  6.0<L>  /  Alb  3.3  /  TBili  0.5  /  DBili  x   /  AST  39  /  ALT  30  /  AlkPhos  90  06-27      Urinalysis Basic - ( 28 Jun 2023 02:30 )    Color: x / Appearance: x / SG: x / pH: x  Gluc: 470 mg/dL / Ketone: x  / Bili: x / Urobili: x   Blood: x / Protein: x / Nitrite: x   Leuk Esterase: x / RBC: x / WBC x   Sq Epi: x / Non Sq Epi: x / Bacteria: x          RADIOLOGY & ADDITIONAL TESTS:  < from: CT Angio Chest PE Protocol w/ IV Cont (06.26.23 @ 00:36) >    ACC: [id number] EXAM:  CT ABDOMEN AND PELVIS IC   ORDERED BY: JAMES TRAVIS     ACC: [id number] EXAM:  CT ANGIO CHEST PULM [facility]   ORDERED BY: JAMES TRAVIS     PROCEDURE DATE:  06/26/2023          INTERPRETATION:  CLINICAL INFORMATION: Syncope, hypoxia. Abdominal pain.   Vomiting. History of gastric adenocarcinoma.    COMPARISON: CT abdomen pelvis 7/29/2020.    CONTRAST/COMPLICATIONS:  IV Contrast: Omnipaque 350 (accession [id number]), IV contrast documented   in unlinked concurrent exam (accession [id number])  90 cc administered   10   cc discarded  Oral Contrast: NONE  Complications: None reported at time of study completion    PROCEDURE:  CT Angiography of the Chest was performed followed by portal venous phase   imaging of the Abdomen and Pelvis.  Sagittal and coronal reformats were performed as well as 3D (MIP)   reconstructions.    FINDINGS:  CHEST:  LUNGS AND LARGE AIRWAYS: Patent central airways. A few small scattered   thin-walled pulmonary cysts.  2 mm rightupper lobe pulmonary nodule (5:43). The lungs are otherwise   clear.  PLEURA: No pleural effusion.  VESSELS: No pulmonary embolism.  HEART: Heart size is normal. No pericardial effusion.  MEDIASTINUM AND DWAYNE: No lymphadenopathy.  CHEST WALL AND LOWER NECK: Within normal limits.    ABDOMEN AND PELVIS:  LIVER: 8 mm hypodensity in the hepatic caudate lobe (3:36) previously   measured 6 mm.  BILE DUCTS: Normal caliber.  GALLBLADDER: Within normal limits.  SPLEEN: Within normal limits.  PANCREAS: Within normal limits.  ADRENALS: Within normal limits.  KIDNEYS/URETERS: Within normal limits.    BLADDER: Within normal limits.  REPRODUCTIVE ORGANS: Prostate is mildly enlarged.    BOWEL: Intact right lower quadrant small bowel anastomosis. No bowel   obstruction. Appendix is normal.  PERITONEUM: No ascites.  VESSELS: Within normal limits.  RETROPERITONEUM/LYMPH NODES: A portal caval lymph node (5:53) measures   3.5 x 2.0 cm, previously 2.3 x 1.3 cm. Another periportal lymph node   (3:39) measures 2.0 x 1.6 cm, previously 1.7 x 1.1 cm.  ABDOMINAL WALL: Midline postsurgical changes.  BONES: Within normal limits.    IMPRESSION:    No pulmonary embolism.    A few small scattered thin-walled pulmonary cysts.  2 mm right upper lobe pulmonary nodule (5:43). The lungs are otherwise   clear. Recommend follow-up noncontrast chest CT in 6 months.    8 mm hypodensity in the hepatic caudate lobe (3:36) previously measured 6   mm. contrast-enhanced MRI abdomen can be obtained.    A portal caval lymph node (5:53) measures 3.5 x 2.0 cm, previously 2.3 x   1.3 cm. Another periportal lymph node (3:39) measures 2.0 x 1.6 cm,   previously 1.7 x 1.1 cm. Given the history of gastric cancer these might   be better evaluated with PET/CT.    < end of copied text >  
                [facility]                                   [address] [place] [address]                                                      PHONE: [phone]                                                         FAX: [phone]  [email or web address]  ---------------------------------------------------------------------------------------------------------------------------------    Reason for Consult: syncope     HPI:  CAROLANN GALEANO is an 63y Male with history of HLD HTN type DM, gastric adenocarcinoma on chemo, hypothyroidism, who presents to [facility] due syncope dizziness positional in nature. Patient went to [facility] for IVF because he was dehydrated; while there he felt lightheaded and passed out.  He reported nausea and vomiting, minimal PO intake. In the ED, CT head, CT C/A/P done and reviewed. He said no head trauma. He has no chest pain, shortness of breath, palpitation, abdominal pain, change in bowel/urinary habit, cough, fever, chills.     PAST MEDICAL & SURGICAL HISTORY:  IDDM (insulin dependent diabetes mellitus)      HTN (hypertension)      DM (diabetes mellitus)      Hypothyroidism      HTN (hypertension)      HLD (hyperlipidemia)      Lesion of stomach      No significant past surgical history          Seafood (Hives; Rash)  Altace (Hives)      MEDICATIONS  (STANDING):  atorvastatin 40 milliGRAM(s) Oral at bedtime  cefepime  Injectable. 1000 milliGRAM(s) IV Push every 12 hours  chlorhexidine 2% Cloths 1 Application(s) Topical <User Schedule>  dextrose 5%. 1000 milliLiter(s) (50 mL/Hr) IV Continuous <Continuous>  dextrose 5%. 1000 milliLiter(s) (100 mL/Hr) IV Continuous <Continuous>  dextrose 50% Injectable 25 Gram(s) IV Push once  dextrose 50% Injectable 12.5 Gram(s) IV Push once  dextrose 50% Injectable 25 Gram(s) IV Push once  enoxaparin Injectable 40 milliGRAM(s) SubCutaneous every 24 hours  glucagon  Injectable 1 milliGRAM(s) IntraMuscular once  insulin glargine Injectable (LANTUS) 15 Unit(s) SubCutaneous at bedtime  insulin lispro (ADMELOG) corrective regimen sliding scale   SubCutaneous three times a day before meals  insulin lispro (ADMELOG) corrective regimen sliding scale   SubCutaneous at bedtime  insulin lispro Injectable (ADMELOG) 10 Unit(s) SubCutaneous three times a day before meals  levothyroxine 50 MICROGram(s) Oral daily  losartan 100 milliGRAM(s) Oral daily  pantoprazole    Tablet 40 milliGRAM(s) Oral before breakfast  sodium chloride 0.9% 1000 milliLiter(s) (100 mL/Hr) IV Continuous <Continuous>    MEDICATIONS  (PRN):  acetaminophen     Tablet .. 650 milliGRAM(s) Oral every 6 hours PRN Temp greater or equal to 38C (100.4F), Mild Pain (1 - 3)  aluminum hydroxide/magnesium hydroxide/simethicone Suspension 30 milliLiter(s) Oral every 4 hours PRN Dyspepsia  dextrose Oral Gel 15 Gram(s) Oral once PRN Blood Glucose LESS THAN 70 milliGRAM(s)/deciliter  melatonin 3 milliGRAM(s) Oral at bedtime PRN Insomnia  ondansetron Injectable 4 milliGRAM(s) IV Push every 8 hours PRN Nausea and/or Vomiting      Social History:  Cigarettes:       none             Alchohol:        none         Illicit Drug Abuse:  none    FH negative for CAD     ROS: Negative other than as mentioned in HPI.    Vital Signs Last 24 Hrs  T(C): 36.7 (28 Jun 2023 11:52), Max: 38.6 (27 Jun 2023 19:56)  T(F): 98 (28 Jun 2023 11:52), Max: 101.5 (27 Jun 2023 19:56)  HR: 84 (28 Jun 2023 11:52) (76 - 120)  BP: 123/73 (28 Jun 2023 11:52) (96/60 - 166/76)  BP(mean): --  RR: 18 (28 Jun 2023 11:52) (14 - 18)  SpO2: 100% (28 Jun 2023 11:52) (95% - 100%)    Parameters below as of 28 Jun 2023 11:52  Patient On (Oxygen Delivery Method): room air      ICU Vital Signs Last 24 Hrs  CAROLANN GALEANO  I&O's Detail    I&O's Summary    Drug Dosing Weight  CAROLANN GALEANO      PHYSICAL EXAM:  General: Appears well developed, alert and cooperative.  HEENT: Head; normocephalic, atraumatic.  Eyes: Pupils reactive, cornea wnl.  Neck: Supple, no nodes adenopathy, no NVD or carotid bruit or thyromegaly.  CARDIOVASCULAR: Normal S1 and S2, No murmur, rub, gallop or lift.   LUNGS: No rales, rhonchi or wheeze. Normal breath sounds bilaterally.  ABDOMEN: Soft, nontender without mass or organomegaly. bowel sounds normoactive.  EXTREMITIES: No clubbing, cyanosis or edema. Distal pulses wnl.   SKIN: warm and dry with normal turgor.  NEURO: Alert/oriented x 3/normal motor exam. No pathologic reflexes.    PSYCH: normal affect.        LABS:                        10.6   1.67  )-----------( 140      ( 28 Jun 2023 02:30 )             31.5     06-28    127<L>  |  92<L>  |  14.4  ----------------------------<  470<HH>  4.7   |  13.0<L>  |  1.06    Ca    8.0<L>      28 Jun 2023 02:30    TPro  6.0<L>  /  Alb  3.3  /  TBili  0.5  /  DBili  x   /  AST  39  /  ALT  30  /  AlkPhos  90  06-27    CAROLANN GALEANO  CARDIAC MARKERS ( 28 Jun 2023 10:25 )  x     / <0.01 ng/mL / x     / x     / x            Urinalysis Basic - ( 28 Jun 2023 02:30 )    Color: x / Appearance: x / SG: x / pH: x  Gluc: 470 mg/dL / Ketone: x  / Bili: x / Urobili: x   Blood: x / Protein: x / Nitrite: x   Leuk Esterase: x / RBC: x / WBC x   Sq Epi: x / Non Sq Epi: x / Bacteria: x        RADIOLOGY & ADDITIONAL STUDIES:    INTERPRETATION OF TELEMETRY (personally reviewed):    ECG:    < from: 12 Lead ECG (06.27.23 @ 15:55) >  Diagnosis Line Normal sinus rhythm  Normal ECG    < end of copied text >      ECHO:    < from: TTE Echo Complete w/o Contrast w/ Doppler (02.23.21 @ 08:14) >  Summary:   1. Technically good study.   2. Normal global left ventricular systolic function.   3. Left ventricular ejection fraction, by visual estimation, is 60 to 65%.   4. Spectral Doppler shows impaired relaxation pattern of left ventricular myocardial filling (Grade I diastolic dysfunction).   5. Trace mitral valve regurgitation.   6. There is no evidence of pericardial effusion.    MD Karen Electronically signed on 2/23/2021 at 9:07:32 AM      < end of copied text >    Assessment and Plan:  In summary, CAROLANN GALEANO is an 63y Male with past medical history significant for HLD HTN type DM, gastric adenocarcinoma on chemo, hypothyroidism, who presents to [facility] due syncope dizziness positional in nature. Patient went to [facility] for IVF because he was dehydrated; while there he felt lightheaded and passed out.  He reported nausea and vomiting, minimal PO intake. In the ED, CT head, CT C/A/P done and reviewed. He said no head trauma. He has no chest pain, shortness of breath, palpitation, abdominal pain, change in bowel/urinary habit, cough, fever, chills.     SIRS sepsis DKA     Syncope likely due to volume depletion     Plan  Agree with IVF   Monitor on TELE   Check TTE to re-evaluate LV EF   Abx and DKA management  as per medicine team          
64 y/o male with PMH of DM-1, gastric adenocarcinoma on chemo, hypothyroidism, HTN, HLD came to the ED s/p syncope.  Patient went to [facility] for IVF because he was dehydrated; while there he felt lightheaded and passed out.  Patient has history of gastric cancer, status post FOLFOX and docetaxel with last dose on 6/16/23.  Labs from today significant for leukopenia WBC count 1.67, hemoglobin 10.5, platelet count 140, sodium 127. ANC yesterday was 1.82.  CT angiogram chest with no evidence of PE.    Vital Signs Last 24 Hrs  T(C): 36.6 (28 Jun 2023 09:01), Max: 38.6 (27 Jun 2023 19:56)  T(F): 97.9 (28 Jun 2023 09:01), Max: 101.5 (27 Jun 2023 19:56)  HR: 78 (28 Jun 2023 09:01) (76 - 120)  BP: 127/69 (28 Jun 2023 09:01) (96/60 - 166/76)  BP(mean): --  RR: 18 (28 Jun 2023 09:01) (14 - 18)  SpO2: 100% (28 Jun 2023 09:01) (95% - 100%)  Parameters below as of 28 Jun 2023 09:01  Patient On (Oxygen Delivery Method): room air    Physical examination:  Alert awake oriented. In no apparent distress, breathing comfortably  HEENT: no cervical lymphadenopathy  Chest:  Clear to auscultation bilaterally  CVS:  No murmur/rubs/gallops; regular rate and rhythm  Abdomen:  Nondistended, nontender, bowel sounds normal, no organomegaly  Extremities:  No pedal edema  Neurological: no focal neurolgical deficit     MEDICATIONS  (STANDING):  atorvastatin 40 milliGRAM(s) Oral at bedtime  cefepime  Injectable. 1000 milliGRAM(s) IV Push every 12 hours  chlorhexidine 2% Cloths 1 Application(s) Topical <User Schedule>  dextrose 5%. 1000 milliLiter(s) (50 mL/Hr) IV Continuous <Continuous>  dextrose 5%. 1000 milliLiter(s) (100 mL/Hr) IV Continuous <Continuous>  dextrose 50% Injectable 25 Gram(s) IV Push once  dextrose 50% Injectable 12.5 Gram(s) IV Push once  dextrose 50% Injectable 25 Gram(s) IV Push once  enoxaparin Injectable 40 milliGRAM(s) SubCutaneous every 24 hours  glucagon  Injectable 1 milliGRAM(s) IntraMuscular once  insulin glargine Injectable (LANTUS) 15 Unit(s) SubCutaneous at bedtime  insulin lispro (ADMELOG) corrective regimen sliding scale   SubCutaneous three times a day before meals  insulin lispro (ADMELOG) corrective regimen sliding scale   SubCutaneous at bedtime  insulin lispro Injectable (ADMELOG) 10 Unit(s) SubCutaneous three times a day before meals  levothyroxine 50 MICROGram(s) Oral daily  losartan 100 milliGRAM(s) Oral daily  pantoprazole    Tablet 40 milliGRAM(s) Oral before breakfast  sodium chloride 0.9% 1000 milliLiter(s) (100 mL/Hr) IV Continuous <Continuous>  MEDICATIONS  (PRN):  acetaminophen     Tablet .. 650 milliGRAM(s) Oral every 6 hours PRN Temp greater or equal to 38C (100.4F), Mild Pain (1 - 3)  aluminum hydroxide/magnesium hydroxide/simethicone Suspension 30 milliLiter(s) Oral every 4 hours PRN Dyspepsia  dextrose Oral Gel 15 Gram(s) Oral once PRN Blood Glucose LESS THAN 70 milliGRAM(s)/deciliter  melatonin 3 milliGRAM(s) Oral at bedtime PRN Insomnia  ondansetron Injectable 4 milliGRAM(s) IV Push every 8 hours PRN Nausea and/or Vomiting    CBC:            10.6   1.67  )-----------( 140      ( 06-28-23 @ 02:30 )             31.5     Chem:         ( 06-28-23 @ 02:30 )    127<L>  |  92<L>  |  14.4  ----------------------------<  470<HH>  4.7   |  13.0<L>  |  1.06    Liver Functions: ( 06-27-23 @ 16:45 )  Alb: 3.3 g/dL / Pro: 6.0 g/dL / ALK PHOS: 90 U/L / ALT: 30 U/L / AST: 39 U/L / GGT: x

## 2023-06-28 NOTE — CONSULT NOTE ADULT - ASSESSMENT
62 y/o male with PMH of DM-1, gastric adenocarcinoma on chemo, hypothyroidism, HTN, HLD came to the ED s/p syncope.  [place] [facility] [place] because he was dehydrated; while there he felt lightheaded and passed out.  Patient has history of gastric cancer, status post FOLFOX and docetaxel with last dose on 6/16/23.  Labs from today significant for leukopenia WBC count 1.67, hemoglobin 10.5, platelet count 140, sodium 127. ANC yesterday was 1.82.  CT angiogram chest with no evidence of PE.    History of gastric cancer:  -patient on chemotherapy with last dose on 6/16/23 (received FOLFOX and docetaxel)  -WBC count today 1.67, please obtain differentials, and if ANC < 1.0k recommend G- mcg subQ to keep ANC> 1.0k  -all therapy on hold during inpatient stay   -anemia likely multifactorial in a patient on chemo and has history of cancer, p.r.n. PRBC transfusion to keep hemoglobin more than 7    Syncopal episode:  -please obtain neurology and cardiology input  -CT head upon presentation unremarkable    Hyponatremia:  -follow-up nephrology recommendations    Will update [facility] daily
Hyponatremia in setting of hyperglycemia and vol depletion (also was taking HCTZ)  High AG metabolic acidosis r/o DKA  No definitive signs of VALERI/CITLALLI post IV contrast  - avoid excessive hypotonic fluids  - hold HCTZ and optimize glucose control  - check urine studies  - cont IV isotonic fluids  - cont ARB for now  - follow labs

## 2023-06-28 NOTE — PROGRESS NOTE ADULT - ASSESSMENT
64 y/o male with PMH of DM-1, gastric adenocarcinoma on chemo, hypothyroidism, HTN, HLD came to the ED s/p syncope. Patient went to [facility] for IVF because he was dehydrated; while there he felt lightheaded and passed out.  He reported nausea and vomiting, minimal PO intake. Of note, he was seen in the ED yesterday for similar presentation. CT head, CT C/A/P done and reviewed. He said no head trauma.   In the ED, he was leukopenic (WBC: 2.40), febrile 101.5. Antibiotic given.     SIRS   UA done and result noted   Blood and urine culture sent, follow up   Will continue antibiotic empirically given immunocompromised     Syncope   Likely due to dehydration   maintain tele  - cardio consult    Hyponatremia   sodium worsened  - send serum osm , urine osm   - renal consulted    agma - sodium bicarb     DM-1  check a1c'  - ssi     HTN/HLD   Atorvastatin 40mg   Will hold HCTZ 25mg in the setting of dehydration  Losartan 100mg     Hypothyroidism   Synthroid 50mcg     Gastric ca   On chemo therapy (last treatment was 06/19/23)  Follows with [facility]     dispo - renal and cardio consulted

## 2023-06-29 LAB
ALBUMIN SERPL ELPH-MCNC: 3 G/DL — LOW (ref 3.3–5.2)
ALP SERPL-CCNC: 88 U/L — SIGNIFICANT CHANGE UP (ref 40–120)
ALT FLD-CCNC: 34 U/L — SIGNIFICANT CHANGE UP
ANION GAP SERPL CALC-SCNC: 11 MMOL/L — SIGNIFICANT CHANGE UP (ref 5–17)
AST SERPL-CCNC: 40 U/L — HIGH
BASOPHILS # BLD AUTO: 0 K/UL — SIGNIFICANT CHANGE UP (ref 0–0.2)
BASOPHILS NFR BLD AUTO: 0 % — SIGNIFICANT CHANGE UP (ref 0–2)
BILIRUB SERPL-MCNC: 0.5 MG/DL — SIGNIFICANT CHANGE UP (ref 0.4–2)
BLD GP AB SCN SERPL QL: SIGNIFICANT CHANGE UP
BUN SERPL-MCNC: 12.1 MG/DL — SIGNIFICANT CHANGE UP (ref 8–20)
CALCIUM SERPL-MCNC: 8 MG/DL — LOW (ref 8.4–10.5)
CHLORIDE SERPL-SCNC: 101 MMOL/L — SIGNIFICANT CHANGE UP (ref 96–108)
CO2 SERPL-SCNC: 22 MMOL/L — SIGNIFICANT CHANGE UP (ref 22–29)
CREAT SERPL-MCNC: 0.82 MG/DL — SIGNIFICANT CHANGE UP (ref 0.5–1.3)
EGFR: 99 ML/MIN/1.73M2 — SIGNIFICANT CHANGE UP
EOSINOPHIL # BLD AUTO: 0.02 K/UL — SIGNIFICANT CHANGE UP (ref 0–0.5)
EOSINOPHIL NFR BLD AUTO: 0.9 % — SIGNIFICANT CHANGE UP (ref 0–6)
GIANT PLATELETS BLD QL SMEAR: PRESENT — SIGNIFICANT CHANGE UP
GLUCOSE BLDC GLUCOMTR-MCNC: 111 MG/DL — HIGH (ref 70–99)
GLUCOSE BLDC GLUCOMTR-MCNC: 171 MG/DL — HIGH (ref 70–99)
GLUCOSE BLDC GLUCOMTR-MCNC: 207 MG/DL — HIGH (ref 70–99)
GLUCOSE BLDC GLUCOMTR-MCNC: 243 MG/DL — HIGH (ref 70–99)
GLUCOSE BLDC GLUCOMTR-MCNC: 301 MG/DL — HIGH (ref 70–99)
GLUCOSE SERPL-MCNC: 243 MG/DL — HIGH (ref 70–99)
HCT VFR BLD CALC: 30.9 % — LOW (ref 39–50)
HGB BLD-MCNC: 10.3 G/DL — LOW (ref 13–17)
HYPOCHROMIA BLD QL: SLIGHT — SIGNIFICANT CHANGE UP
LYMPHOCYTES # BLD AUTO: 0.67 K/UL — LOW (ref 1–3.3)
LYMPHOCYTES # BLD AUTO: 37.4 % — SIGNIFICANT CHANGE UP (ref 13–44)
MAGNESIUM SERPL-MCNC: 1.9 MG/DL — SIGNIFICANT CHANGE UP (ref 1.6–2.6)
MANUAL SMEAR VERIFICATION: SIGNIFICANT CHANGE UP
MCHC RBC-ENTMCNC: 26.3 PG — LOW (ref 27–34)
MCHC RBC-ENTMCNC: 33.3 GM/DL — SIGNIFICANT CHANGE UP (ref 32–36)
MCV RBC AUTO: 79 FL — LOW (ref 80–100)
MONOCYTES # BLD AUTO: 0.32 K/UL — SIGNIFICANT CHANGE UP (ref 0–0.9)
MONOCYTES NFR BLD AUTO: 17.8 % — HIGH (ref 2–14)
MRSA PCR RESULT.: SIGNIFICANT CHANGE UP
NEUTROPHILS # BLD AUTO: 0.67 K/UL — LOW (ref 1.8–7.4)
NEUTROPHILS NFR BLD AUTO: 37.4 % — LOW (ref 43–77)
OSMOLALITY UR: 647 MOSM/KG — SIGNIFICANT CHANGE UP (ref 300–1000)
PHOSPHATE SERPL-MCNC: 0.9 MG/DL — CRITICAL LOW (ref 2.4–4.7)
PLAT MORPH BLD: NORMAL — SIGNIFICANT CHANGE UP
PLATELET # BLD AUTO: 138 K/UL — LOW (ref 150–400)
POTASSIUM SERPL-MCNC: 3.6 MMOL/L — SIGNIFICANT CHANGE UP (ref 3.5–5.3)
POTASSIUM SERPL-SCNC: 3.6 MMOL/L — SIGNIFICANT CHANGE UP (ref 3.5–5.3)
PROT SERPL-MCNC: 5.7 G/DL — LOW (ref 6.6–8.7)
RBC # BLD: 3.91 M/UL — LOW (ref 4.2–5.8)
RBC # FLD: 15.3 % — HIGH (ref 10.3–14.5)
RBC BLD AUTO: ABNORMAL
S AUREUS DNA NOSE QL NAA+PROBE: SIGNIFICANT CHANGE UP
SODIUM SERPL-SCNC: 134 MMOL/L — LOW (ref 135–145)
SODIUM UR-SCNC: 149 MMOL/L — SIGNIFICANT CHANGE UP
TSH SERPL-MCNC: 3.37 UIU/ML — SIGNIFICANT CHANGE UP (ref 0.27–4.2)
VARIANT LYMPHS # BLD: 6.5 % — HIGH (ref 0–6)
WBC # BLD: 1.79 K/UL — LOW (ref 3.8–10.5)
WBC # FLD AUTO: 1.79 K/UL — LOW (ref 3.8–10.5)

## 2023-06-29 PROCEDURE — 99233 SBSQ HOSP IP/OBS HIGH 50: CPT

## 2023-06-29 RX ORDER — FILGRASTIM 480MCG/1.6
300 VIAL (ML) INJECTION ONCE
Refills: 0 | Status: COMPLETED | OUTPATIENT
Start: 2023-06-29 | End: 2023-06-29

## 2023-06-29 RX ORDER — SIMETHICONE 80 MG/1
80 TABLET, CHEWABLE ORAL THREE TIMES A DAY
Refills: 0 | Status: DISCONTINUED | OUTPATIENT
Start: 2023-06-29 | End: 2023-06-30

## 2023-06-29 RX ORDER — FILGRASTIM 480MCG/1.6
365 VIAL (ML) INJECTION ONCE
Refills: 0 | Status: DISCONTINUED | OUTPATIENT
Start: 2023-06-29 | End: 2023-06-29

## 2023-06-29 RX ORDER — BNT162B2 ORIGINAL AND OMICRON BA.4/BA.5 .1125; .1125 MG/2.25ML; MG/2.25ML
0.3 INJECTION, SUSPENSION INTRAMUSCULAR ONCE
Refills: 0 | Status: COMPLETED | OUTPATIENT
Start: 2023-06-29 | End: 2023-06-29

## 2023-06-29 RX ORDER — POTASSIUM PHOSPHATE, MONOBASIC POTASSIUM PHOSPHATE, DIBASIC 236; 224 MG/ML; MG/ML
30 INJECTION, SOLUTION INTRAVENOUS ONCE
Refills: 0 | Status: COMPLETED | OUTPATIENT
Start: 2023-06-29 | End: 2023-06-29

## 2023-06-29 RX ADMIN — PANTOPRAZOLE SODIUM 40 MILLIGRAM(S): 20 TABLET, DELAYED RELEASE ORAL at 06:05

## 2023-06-29 RX ADMIN — CHLORHEXIDINE GLUCONATE 1 APPLICATION(S): 213 SOLUTION TOPICAL at 05:05

## 2023-06-29 RX ADMIN — Medication 10 UNIT(S): at 11:05

## 2023-06-29 RX ADMIN — CEFEPIME 1000 MILLIGRAM(S): 1 INJECTION, POWDER, FOR SOLUTION INTRAMUSCULAR; INTRAVENOUS at 10:17

## 2023-06-29 RX ADMIN — Medication 10 UNIT(S): at 07:46

## 2023-06-29 RX ADMIN — POTASSIUM PHOSPHATE, MONOBASIC POTASSIUM PHOSPHATE, DIBASIC 83.33 MILLIMOLE(S): 236; 224 INJECTION, SOLUTION INTRAVENOUS at 11:04

## 2023-06-29 RX ADMIN — SODIUM CHLORIDE 100 MILLILITER(S): 9 INJECTION, SOLUTION INTRAVENOUS at 05:02

## 2023-06-29 RX ADMIN — SODIUM CHLORIDE 100 MILLILITER(S): 9 INJECTION, SOLUTION INTRAVENOUS at 07:48

## 2023-06-29 RX ADMIN — Medication 30 MILLILITER(S): at 23:30

## 2023-06-29 RX ADMIN — INSULIN GLARGINE 15 UNIT(S): 100 INJECTION, SOLUTION SUBCUTANEOUS at 21:31

## 2023-06-29 RX ADMIN — Medication 1: at 11:05

## 2023-06-29 RX ADMIN — Medication 650 MILLIGRAM(S): at 17:00

## 2023-06-29 RX ADMIN — Medication 650 MILLIGRAM(S): at 17:45

## 2023-06-29 RX ADMIN — Medication 300 MICROGRAM(S): at 17:03

## 2023-06-29 RX ADMIN — CEFEPIME 1000 MILLIGRAM(S): 1 INJECTION, POWDER, FOR SOLUTION INTRAMUSCULAR; INTRAVENOUS at 20:25

## 2023-06-29 RX ADMIN — ENOXAPARIN SODIUM 40 MILLIGRAM(S): 100 INJECTION SUBCUTANEOUS at 05:01

## 2023-06-29 RX ADMIN — Medication 10 UNIT(S): at 16:35

## 2023-06-29 RX ADMIN — ATORVASTATIN CALCIUM 40 MILLIGRAM(S): 80 TABLET, FILM COATED ORAL at 21:31

## 2023-06-29 RX ADMIN — Medication 2: at 16:36

## 2023-06-29 RX ADMIN — Medication 50 MICROGRAM(S): at 05:01

## 2023-06-29 RX ADMIN — Medication 4: at 07:46

## 2023-06-29 RX ADMIN — LOSARTAN POTASSIUM 100 MILLIGRAM(S): 100 TABLET, FILM COATED ORAL at 05:01

## 2023-06-29 NOTE — PROGRESS NOTE ADULT - SUBJECTIVE AND OBJECTIVE BOX
NEPHROLOGY INTERVAL HPI/OVERNIGHT EVENTS:  pt comfortable  no acute distress noted    MEDICATIONS  (STANDING):  atorvastatin 40 milliGRAM(s) Oral at bedtime  cefepime  Injectable. 1000 milliGRAM(s) IV Push every 12 hours  chlorhexidine 2% Cloths 1 Application(s) Topical <User Schedule>  dextrose 5%. 1000 milliLiter(s) (50 mL/Hr) IV Continuous <Continuous>  dextrose 5%. 1000 milliLiter(s) (100 mL/Hr) IV Continuous <Continuous>  dextrose 50% Injectable 25 Gram(s) IV Push once  dextrose 50% Injectable 12.5 Gram(s) IV Push once  dextrose 50% Injectable 25 Gram(s) IV Push once  enoxaparin Injectable 40 milliGRAM(s) SubCutaneous every 24 hours  glucagon  Injectable 1 milliGRAM(s) IntraMuscular once  insulin glargine Injectable (LANTUS) 15 Unit(s) SubCutaneous at bedtime  insulin lispro (ADMELOG) corrective regimen sliding scale   SubCutaneous three times a day before meals  insulin lispro (ADMELOG) corrective regimen sliding scale   SubCutaneous at bedtime  insulin lispro Injectable (ADMELOG) 10 Unit(s) SubCutaneous three times a day before meals  levothyroxine 50 MICROGram(s) Oral daily  losartan 100 milliGRAM(s) Oral daily  pantoprazole    Tablet 40 milliGRAM(s) Oral before breakfast  potassium phosphate IVPB 30 milliMole(s) IV Intermittent once  sodium chloride 0.9% 1000 milliLiter(s) (100 mL/Hr) IV Continuous <Continuous>    MEDICATIONS  (PRN):  acetaminophen     Tablet .. 650 milliGRAM(s) Oral every 6 hours PRN Temp greater or equal to 38C (100.4F), Mild Pain (1 - 3)  aluminum hydroxide/magnesium hydroxide/simethicone Suspension 30 milliLiter(s) Oral every 4 hours PRN Dyspepsia  dextrose Oral Gel 15 Gram(s) Oral once PRN Blood Glucose LESS THAN 70 milliGRAM(s)/deciliter  melatonin 3 milliGRAM(s) Oral at bedtime PRN Insomnia  ondansetron Injectable 4 milliGRAM(s) IV Push every 8 hours PRN Nausea and/or Vomiting      Allergies    Seafood (Hives; Rash)  Altace (Hives)        Vital Signs Last 24 Hrs  T(C): 36.7 (29 Jun 2023 04:26), Max: 37.4 (28 Jun 2023 15:38)  T(F): 98.1 (29 Jun 2023 04:26), Max: 99.4 (28 Jun 2023 15:38)  HR: 95 (29 Jun 2023 04:26) (84 - 102)  BP: 135/69 (29 Jun 2023 04:26) (123/73 - 168/73)  BP(mean): --  RR: 18 (29 Jun 2023 04:26) (17 - 18)  SpO2: 98% (29 Jun 2023 04:26) (97% - 100%)    Parameters below as of 29 Jun 2023 04:26  Patient On (Oxygen Delivery Method): room air        PHYSICAL EXAM:  GENERAL: Comfortable  EYES: Conjunctiva and sclera clear  ENMT: Moist MMs  NECK: Supple, No JVD  NERVOUS SYSTEM:  Alert & Oriented X3, intact and symmetric  CHEST/LUNG: Clear bilaterally  HEART: Regular rate and rhythm; No rub  ABDOMEN: Soft, Nontender, +BS  EXTREMITIES:  2+ Peripheral Pulses, No dependent edema  SKIN: No rashes or lesions    LABS:                        10.3   1.79  )-----------( 138      ( 29 Jun 2023 05:15 )             30.9     06-29    134<L>  |  101  |  12.1  ----------------------------<  243<H>  3.6   |  22.0  |  0.82    Ca    8.0<L>      29 Jun 2023 05:15  Phos  0.9     06-29  Mg     1.9     06-29    TPro  5.7<L>  /  Alb  3.0<L>  /  TBili  0.5  /  DBili  x   /  AST  40<H>  /  ALT  34  /  AlkPhos  88  06-29      Urinalysis Basic - ( 29 Jun 2023 05:15 )    Color: x / Appearance: x / SG: x / pH: x  Gluc: 243 mg/dL / Ketone: x  / Bili: x / Urobili: x   Blood: x / Protein: x / Nitrite: x   Leuk Esterase: x / RBC: x / WBC x   Sq Epi: x / Non Sq Epi: x / Bacteria: x      Magnesium: 1.9 mg/dL (06-29 @ 05:15)  Phosphorus: 0.9 mg/dL (06-29 @ 05:15)      RADIOLOGY & ADDITIONAL TESTS:  < from: CT Angio Chest PE Protocol w/ IV Cont (06.26.23 @ 00:36) >    ACC: [id number] EXAM:  CT ABDOMEN AND PELVIS IC   ORDERED BY: JAMES TRAVIS     ACC: [id number] EXAM:  CT ANGIO CHEST PULM ART [facility]   ORDERED BY: JAMES TRAVIS     PROCEDURE DATE:  06/26/2023          INTERPRETATION:  CLINICAL INFORMATION: Syncope, hypoxia. Abdominal pain.   Vomiting. History of gastric adenocarcinoma.    COMPARISON: CT abdomen pelvis 7/29/2020.    CONTRAST/COMPLICATIONS:  IV Contrast: Omnipaque 350 (accession [id number]), IV contrast documented   in unlinked concurrent exam (accession [id number])  90 cc administered   10   cc discarded  Oral Contrast: NONE  Complications: None reported at time of study completion    PROCEDURE:  CT Angiography of the Chest was performed followed by portal venous phase   imaging of the Abdomen and Pelvis.  Sagittal and coronal reformats were performed as well as 3D (MIP)   reconstructions.    FINDINGS:  CHEST:  LUNGS AND LARGE AIRWAYS: Patent central airways. A few small scattered   thin-walled pulmonary cysts.  2 mm rightupper lobe pulmonary nodule (5:43). The lungs are otherwise   clear.  PLEURA: No pleural effusion.  VESSELS: No pulmonary embolism.  HEART: Heart size is normal. No pericardial effusion.  MEDIASTINUM AND DWAYNE: No lymphadenopathy.  CHEST WALL AND LOWER NECK: Within normal limits.    ABDOMEN AND PELVIS:  LIVER: 8 mm hypodensity in the hepatic caudate lobe (3:36) previously   measured 6 mm.  BILE DUCTS: Normal caliber.  GALLBLADDER: Within normal limits.  SPLEEN: Within normal limits.  PANCREAS: Within normal limits.  ADRENALS: Within normal limits.  KIDNEYS/URETERS: Within normal limits.    BLADDER: Within normal limits.  REPRODUCTIVE ORGANS: Prostate is mildly enlarged.    BOWEL: Intact right lower quadrant small bowel anastomosis. No bowel   obstruction. Appendix is normal.  PERITONEUM: No ascites.  VESSELS: Within normal limits.  RETROPERITONEUM/LYMPH NODES: A portal caval lymph node (5:53) measures   3.5 x 2.0 cm, previously 2.3 x 1.3 cm. Another periportal lymph node   (3:39) measures 2.0 x 1.6 cm, previously 1.7 x 1.1 cm.  ABDOMINAL WALL: Midline postsurgical changes.  BONES: Within normal limits.    IMPRESSION:    No pulmonary embolism.    A few small scattered thin-walled pulmonary cysts.  2 mm right upper lobe pulmonary nodule (5:43). The lungs are otherwise   clear. Recommend follow-up noncontrast chest CT in 6 months.    8 mm hypodensity in the hepatic caudate lobe (3:36) previously measured 6   mm. contrast-enhanced MRI abdomen can be obtained.    A portal caval lymph node (5:53) measures 3.5 x 2.0 cm, previously 2.3 x   1.3 cm. Another periportal lymph node (3:39) measures 2.0 x 1.6 cm,   previously 1.7 x 1.1 cm. Given the history of gastric cancer these might   be better evaluated with PET/CT.    < end of copied text >

## 2023-06-29 NOTE — PROGRESS NOTE ADULT - ASSESSMENT
63y/oM PMH DM1, gastric adenocarcinoma on chemo, hypothyroidism, HTN, HLD came to ER s/p syncope. Pt went to [facility] for IVF because he was dehydrated, while there he felt lightheaded and passed out. He reported n/v, minimal PO intake. Of note, seen in ER 6/25 for similar presentation. [facility], CT c/a/p reviewed. Denies head trauma. In ER, leukopenic, febrile, admitted for further w/u.     Syncope  -likely 2/2 dehydration   -cardio recs appreciated   -LVEF 65%, grade I ddx    SIRS, on admission   -UA reviewed  -ucx and blood cx ngtd   -CT c/a/p: neg for PE, few small scattered thin-walled pulmonary cysts, 2mm RUL nodule, 8mm hepatic caudate lobe hypodensity, portal caval LN, periportal LN  -cont empiric abx     Hyponatremia   -nephro recs appreciated   -holding hctz   -s/p IVF     IDDM- 63y/oM PMH DM1, gastric adenocarcinoma on chemo, hypothyroidism, HTN, HLD came to ER s/p syncope. Pt went to [facility] for IVF because he was dehydrated, while there he felt lightheaded and passed out. He reported n/v, minimal PO intake. Of note, seen in ER 6/25 for similar presentation. [facility], CT c/a/p reviewed. Denies head trauma. In ER, leukopenic, febrile, admitted for further w/u.     Syncope  -likely 2/2 dehydration   -cardio recs appreciated   -LVEF 65%, grade I ddx  -f/u orthostatics    SIRS, on admission   -UA reviewed  -ucx and blood cx ngtd   -CT c/a/p: neg for PE, few small scattered thin-walled pulmonary cysts, 2mm RUL nodule, 8mm hepatic caudate lobe hypodensity, portal caval LN, periportal LN  -cont empiric abx     Hyponatremia   -nephro recs appreciated   -holding hctz   -s/p IVF     IDDM-type 1   DKA on presentation, improved   AGMA, resolved   -hgba1c 7.4  -iss     Hypophosphatemia   -repleted  -f/u repeat in am    Gastric ca   -on chemo, last dose 6/16 (FOLFOX and docetaxel)   -Zarxio x1 as per heme/onc; ordered today 6/29

## 2023-06-29 NOTE — PATIENT PROFILE ADULT - VISION (WITH CORRECTIVE LENSES IF THE PATIENT USUALLY WEARS THEM):
[address] [facility], [address] [employer] [address] [place] [address], [address] [place] [address]  Phone: [phone]   Fax:     [phone]    Physical Therapy Daily Treatment Note  Date:  2020    Patient Name:  Lianna Persaud    :  1966  MRN: [id number]  Restrictions/Precautions:    Medical/Treatment Diagnosis Information:  · Diagnosis: M25.511 (ICD-10-CM) - Right shoulder pain, unspecified chronicity ; M75.81 (ICD-10-CM) - Rotator cuff tendinitis, right ; M75.41 (ICD-10-CM) - Shoulder impingement, right  · Treatment Diagnosis: M25.511 Pain in Right Shoulder  Insurance/Certification information:  PT Insurance Information: [employer]  Physician Information:  Referring Practitioner: Brigitte Lefort, MD  Has the plan of care been signed (Y/N):        []  Yes  [x]  No     Date of Patient follow up with Physician: 20      Is this a Progress Report:     []  Yes  [x]  No        If Yes:  Date Range for reporting period:  Beginning 20  Ending 20    Progress report will be due (10 Rx or 30 days whichever is less):       Recertification will be due (POC Duration  / 90 days whichever is less): 20         Visit # Insurance Allowable Auth Required   1   60 []  Yes [x]  No        Functional Scale: UEFS: 45% deficit   Date assessed: 20     Latex Allergy:  [x]NO      []YES  Preferred Language for Healthcare:   [x]English       []other:    Pain level:  0-9/10     SUBJECTIVE:  See eval        OBJECTIVE:   ROM   PROM AROM  Comment    L R L R    Flexion   WNL 97     Abduction   WNL 59 + pain present    ER   WNL L Ear    IR   WNL L PSIS + pain present    Other  (cervical)        Other             Strength   L R Comment   Flexion  4+ pain present    Abduction  3+ pain present    ER  4+ pain present    IR  4 pain present    Supraspinatus      Upper Trap      Lower Trap      Mid Trap      Rhomboids      Biceps      Triceps Horizontal Abduction      Horizontal Adduction      Lats        Special Tests  11/30 Results/Comment   Hugh Positive for pain   Neers positive   Speeds    OBriens    Apprehension     Load & Shift         Reflexes/Sensation:  11/30              [x]Dermatomes/Myotomes intact               []Reflexes equal and normal bilaterally               []Other:     Joint mobility:  11/30               [x]Normal               []Hypo              []Hyper     Palpation: Denies TTP  11/30     Functional Mobility/Transfers: Independent with increased pain; difficulty sleeping (6-7 hours); pain with reaching overhead, out to side, and behind back; pain and difficulty reaching into cabinets; pain with dressing  11/30     Posture: Forward head and rounded shoulders  11/30     Gait: (include devices/WB status): WNL  11/30                       [x] Patient history, allergies, meds reviewed. Medical chart reviewed. See intake form. 11/30     Review Of Systems (ROS):  [x]Performed Review of systems (Integumentary, CardioPulmonary, Neurological) by intake and observation. Intake form has been scanned into medical record. Patient has been instructed to contact their primary care physician regarding ROS issues if not already being addressed at this time.   11/30    RESTRICTIONS/PRECAUTIONS: None    Exercises/Interventions:   Exercises:  Exercise/Equipment Resistance/Repetitions Other comments   Stretching/PROM     Wand     Table Slides 10 sec x 10 Start 11/30   UE [place]     Pulleys     Pendulum     Wall walks 10 sec x 10 Start 11/30   IR BB 10 sec x 10 Start 11/30   ER at side with cane 10 sec x 10 Start 11/30        Isometrics     Retraction          Weight shift     Flexion     Abduction     External Rotation     Internal Rotation     Biceps     Triceps          PRE's     Flexion     Abduction     External Rotation     Internal Rotation     Shrugs     EXT     Reverse Flys     Serratus     Horizontal Abd with ER     Biceps Triceps     Retraction          Cable Column/Theraband     External Rotation     Internal Rotation     Shrugs     Lats     Ext     Flex     Scapular Retraction     BIC     TRIC     PNF          Dynamic Stability          Plyoback          Manual interventions                     Therapeutic Exercise and NMR EXR  [x] (31452) Provided verbal/tactile cueing for activities related to strengthening, flexibility, endurance, ROM  for improvements in scapular, scapulothoracic and UE control with self care, reaching, carrying, lifting, house/yardwork, driving/computer work.    [] (82737) Provided verbal/tactile cueing for activities related to improving balance, coordination, kinesthetic sense, posture, motor skill, proprioception  to assist with  scapular, scapulothoracic and UE control with self care, reaching, carrying, lifting, house/yardwork, driving/computer work. Therapeutic Activities:    [] (33386 or 15022) Provided verbal/tactile cueing for activities related to improving balance, coordination, kinesthetic sense, posture, motor skill, proprioception and motor activation to allow for proper function of scapular, scapulothoracic and UE control with self care, carrying, lifting, driving/computer work.      Home Exercise Program:    [x] (26631) Reviewed/Progressed HEP activities related to strengthening, flexibility, endurance, ROM of scapular, scapulothoracic and UE control with self care, reaching, carrying, lifting, house/yardwork, driving/computer work  [] (23009) Reviewed/Progressed HEP activities related to improving balance, coordination, kinesthetic sense, posture, motor skill, proprioception of scapular, scapulothoracic and UE control with self care, reaching, carrying, lifting, house/yardwork, driving/computer work      Manual Treatments:  PROM / STM / Oscillations-Mobs:  G-I, II, III, IV (PA's, Inf., Post.)  [] (19139) Provided manual therapy to mobilize soft tissue/joints of cervical/CT, scapular GHJ and UE for the purpose of modulating pain, promoting relaxation,  increasing ROM, reducing/eliminating soft tissue swelling/inflammation/restriction, improving soft tissue extensibility and allowing for proper ROM for normal function with self care, reaching, carrying, lifting, house/yardwork, driving/computer work    Modalities:      Charges:  Timed Code Treatment Minutes: 15 + EVAL   Total Treatment Minutes: 45       [] EVAL (LOW) 27973 (typically 20 minutes face-to-face)  [x] EVAL (MOD) 75312 (typically 30 minutes face-to-face)  [] EVAL (HIGH) 14212 (typically 45 minutes face-to-face)  [] RE-EVAL     [x] QG(57052) x 1    [] IONTO  [] NMR (30680) x     [] VASO  [] Manual (71377) x      [] Other:  [] TA x      [] Mech Traction (30624)  [] ES(attended) (51694)      [] ES (un) (24630):     GOALS:  Patient stated goal: Reduce pain; Be able to reach overhead    [] Progressing: [] Met: [] Not Met: [] Adjusted    Therapist goals for Patient:   Short Term Goals: To be achieved in: 2 weeks  1. Independent in HEP and progression per patient tolerance, in order to prevent re-injury. [] Progressing: [] Met: [] Not Met: [] Adjusted   2. Patient will have a decrease in pain to facilitate improvement in movement, function, and ADLs as indicated by Functional Deficits. [] Progressing: [] Met: [] Not Met: [] Adjusted    Long Term Goals: To be achieved in: 4 weeks  1. Disability index score of 20% or less for the UEFS to assist with reaching prior level of function. [] Progressing: [] Met: [] Not Met: [] Adjusted  2. Patient will demonstrate increased AROM to WNL to allow for proper joint functioning as indicated by patients Functional Deficits. [] Progressing: [] Met: [] Not Met: [] Adjusted  3. Patient will demonstrate an increase in strength to good scapular and core control  for UE to allow for proper functional mobility as indicated by patients Functional Deficits.    [] Progressing: [] Met: [] Not Met: [] Adjusted  4. Patient will return to Independent functional activities without increased symptoms or restriction. [] Progressing: [] Met: [] Not Met: [] Adjusted    Overall Progression Towards Functional goals/ Treatment Progress Update:  [] Patient is progressing as expected towards functional goals listed. [] Progression is slowed due to complexities/Impairments listed. [] Progression has been slowed due to co-morbidities. [x] Plan just implemented, too soon to assess goals progression <30days   [] Goals require adjustment due to lack of progress  [] Patient is not progressing as expected and requires additional follow up with physician  [] Other    Prognosis for POC: [x] Good [] Fair  [] Poor      Patient requires continued skilled intervention: [x] Yes  [] No    Treatment/Activity Tolerance:  [x] Patient able to complete treatment  [] Patient limited by fatigue  [] Patient limited by pain     [] Patient limited by other medical complications  [] Other:                   Patient Education:                  PLAN: See eval  [] Continue per plan of care [] Alter current plan (see comments above)  [x] Plan of care initiated [] Hold pending MD visit [] Discharge      Electronically signed by:  Arnaldo Ortega, PT, DPT    Note: If patient does not return for scheduled/ recommended follow up visits, this note will serve as a discharge from care along with most recent update on progress. Partially impaired: cannot see medication labels or newsprint, but can see obstacles in path, and the surrounding layout; can count fingers at arm's length

## 2023-06-29 NOTE — PATIENT PROFILE ADULT - FALL HARM RISK - HARM RISK INTERVENTIONS

## 2023-06-29 NOTE — PROGRESS NOTE ADULT - SUBJECTIVE AND OBJECTIVE BOX
[facility]                                   [address] [place] [address]                                                      PHONE: [phone]                                                         FAX: [phone]  [email or web address]  ---------------------------------------------------------------------------------------------------------------------------------    Overnight events/patient complaints: no acute events. awaiting echocardiogram     ECHO:  < from: TTE Echo Complete w/o Contrast w/ Doppler (02.23.21 @ 08:14) >   1. Technically good study.   2. Normal global left ventricular systolic function.   3. Left ventricular ejection fraction, by visual estimation, is 60 to 65%.   4. Spectral Doppler shows impaired relaxation pattern of left ventricular myocardial filling (Grade I diastolic dysfunction).   5. Trace mitral valve regurgitation.   6. There is no evidence of pericardial effusion.    < end of copied text >      MEDICATIONS  (STANDING):  atorvastatin 40 milliGRAM(s) Oral at bedtime  cefepime  Injectable. 1000 milliGRAM(s) IV Push every 12 hours  chlorhexidine 2% Cloths 1 Application(s) Topical <User Schedule>  dextrose 5%. 1000 milliLiter(s) (100 mL/Hr) IV Continuous <Continuous>  dextrose 5%. 1000 milliLiter(s) (50 mL/Hr) IV Continuous <Continuous>  dextrose 50% Injectable 25 Gram(s) IV Push once  dextrose 50% Injectable 12.5 Gram(s) IV Push once  dextrose 50% Injectable 25 Gram(s) IV Push once  enoxaparin Injectable 40 milliGRAM(s) SubCutaneous every 24 hours  glucagon  Injectable 1 milliGRAM(s) IntraMuscular once  insulin glargine Injectable (LANTUS) 15 Unit(s) SubCutaneous at bedtime  insulin lispro (ADMELOG) corrective regimen sliding scale   SubCutaneous three times a day before meals  insulin lispro (ADMELOG) corrective regimen sliding scale   SubCutaneous at bedtime  insulin lispro Injectable (ADMELOG) 10 Unit(s) SubCutaneous three times a day before meals  levothyroxine 50 MICROGram(s) Oral daily  losartan 100 milliGRAM(s) Oral daily  pantoprazole    Tablet 40 milliGRAM(s) Oral before breakfast  potassium phosphate IVPB 30 milliMole(s) IV Intermittent once  sodium chloride 0.9% 1000 milliLiter(s) (100 mL/Hr) IV Continuous <Continuous>    MEDICATIONS  (PRN):  acetaminophen     Tablet .. 650 milliGRAM(s) Oral every 6 hours PRN Temp greater or equal to 38C (100.4F), Mild Pain (1 - 3)  aluminum hydroxide/magnesium hydroxide/simethicone Suspension 30 milliLiter(s) Oral every 4 hours PRN Dyspepsia  dextrose Oral Gel 15 Gram(s) Oral once PRN Blood Glucose LESS THAN 70 milliGRAM(s)/deciliter  melatonin 3 milliGRAM(s) Oral at bedtime PRN Insomnia  ondansetron Injectable 4 milliGRAM(s) IV Push every 8 hours PRN Nausea and/or Vomiting      Vital Signs Last 24 Hrs  T(C): 36.7 (29 Jun 2023 04:26), Max: 37.4 (28 Jun 2023 15:38)  T(F): 98.1 (29 Jun 2023 04:26), Max: 99.4 (28 Jun 2023 15:38)  HR: 95 (29 Jun 2023 04:26) (84 - 102)  BP: 135/69 (29 Jun 2023 04:26) (123/73 - 168/73)  BP(mean): --  RR: 18 (29 Jun 2023 04:26) (17 - 18)  SpO2: 98% (29 Jun 2023 04:26) (97% - 100%)    Parameters below as of 29 Jun 2023 04:26  Patient On (Oxygen Delivery Method): room air      ICU Vital Signs Last 24 Hrs    PHYSICAL EXAM:  General: no acute distress   HEENT: Head; normocephalic, atraumatic.Pupils reactive, cornea wnl. Neck supple, no nodes adenopathy, no JVD  CARDIOVASCULAR: Normal S1 and S2, 1/6 XAVI, no rub, gallop or lift.   LUNGS: No rales, rhonchi or wheeze. Normal breath sounds bilaterally.  ABDOMEN: Soft, nontender without mass or organomegaly. bowel sounds normoactive.  EXTREMITIES: No clubbing, cyanosis or edema.   SKIN: warm and dry with normal turgor.  NEURO: Alert/oriented x 3/normal motor exam.   PSYCH: normal affect.        LABS:                        10.3   1.79  )-----------( 138      ( 29 Jun 2023 05:15 )             30.9     06-29    134<L>  |  101  |  12.1  ----------------------------<  243<H>  3.6   |  22.0  |  0.82    Ca    8.0<L>      29 Jun 2023 05:15  Phos  0.9     06-29  Mg     1.9     06-29    TPro  5.7<L>  /  Alb  3.0<L>  /  TBili  0.5  /  DBili  x   /  AST  40<H>  /  ALT  34  /  AlkPhos  88  06-29    CAROLANN GALEANO  CARDIAC MARKERS ( 28 Jun 2023 10:25 )  x     / <0.01 ng/mL / x     / x     / x          ASSESSMENT AND PLAN:  In summary, CAROLANN GALEANO is a 63y Male with past medical history significant for HLD. HTN. type II DM, gastric adenocarcinoma status post FOLFOX and docetaxel with last dose on 6/16/23, and hypothyroidism, who presents to [facility] with postural dizziness and syncope in the setting of n/v and decreased PO intake, additionally noted to hyperglycemic with metabolic acidosis     Syncope  - 2/2 hypovolemia, consider orthostatic vitals   - IVF with monitoring of hyponatremia and volume status  - hyperglycemia management per primary team   - TTE pending re: structural heart disease    - clinically without evidence of ischemia   - further recommendations pending clinical course and results of cardiovascular testing     Thank you for allowing [facility] to participate in the care of this patient.  Please feel free to call with any questions or concerns.

## 2023-06-29 NOTE — PROGRESS NOTE ADULT - SUBJECTIVE AND OBJECTIVE BOX
CAROLANN GALEANO    [id number]    63y      Male    CC: syncope    INTERVAL HPI/OVERNIGHT EVENTS: pt seen and examined. no acute events reported o/n     REVIEW OF SYSTEMS:    CONSTITUTIONAL: No weight loss  RESPIRATORY: No cough, wheezing, hemoptysis; No shortness of breath  CARDIOVASCULAR: No chest pain, palpitations  GASTROINTESTINAL: No abdominal or epigastric pain. No nausea, vomiting  NEUROLOGICAL: No headaches    Vital Signs Last 24 Hrs  T(C): 37.2 (29 Jun 2023 10:52), Max: 37.4 (28 Jun 2023 15:38)  T(F): 98.9 (29 Jun 2023 10:52), Max: 99.4 (28 Jun 2023 15:38)  HR: 86 (29 Jun 2023 10:52) (86 - 102)  BP: 135/71 (29 Jun 2023 10:52) (135/69 - 168/73)  BP(mean): --  RR: 18 (29 Jun 2023 10:52) (17 - 18)  SpO2: 97% (29 Jun 2023 10:52) (97% - 99%)    Parameters below as of 29 Jun 2023 10:52  Patient On (Oxygen Delivery Method): room air        PHYSICAL EXAM:    GENERAL: NAD  HEENT: +EOMI  NECK: soft, supple  CHEST/LUNG: Clear to auscultation bilaterally  HEART: S1S2+, Regular rate and rhythm  ABDOMEN: Soft, Nontender, Nondistended; Bowel sounds present  SKIN: warm, dry  NEURO: AAOX3, grossly non-focal   PSYCH: normal affect     LABS:                        10.3   1.79  )-----------( 138      ( 29 Jun 2023 05:15 )             30.9     06-29    134<L>  |  101  |  12.1  ----------------------------<  243<H>  3.6   |  22.0  |  0.82    Ca    8.0<L>      29 Jun 2023 05:15  Phos  0.9     06-29  Mg     1.9     06-29    TPro  5.7<L>  /  Alb  3.0<L>  /  TBili  0.5  /  DBili  x   /  AST  40<H>  /  ALT  34  /  AlkPhos  88  06-29      Urinalysis Basic - ( 29 Jun 2023 05:15 )    Color: x / Appearance: x / SG: x / pH: x  Gluc: 243 mg/dL / Ketone: x  / Bili: x / Urobili: x   Blood: x / Protein: x / Nitrite: x   Leuk Esterase: x / RBC: x / WBC x   Sq Epi: x / Non Sq Epi: x / Bacteria: x          MEDICATIONS  (STANDING):  atorvastatin 40 milliGRAM(s) Oral at bedtime  cefepime  Injectable. 1000 milliGRAM(s) IV Push every 12 hours  chlorhexidine 2% Cloths 1 Application(s) Topical <User Schedule>  dextrose 5%. 1000 milliLiter(s) (50 mL/Hr) IV Continuous <Continuous>  dextrose 5%. 1000 milliLiter(s) (100 mL/Hr) IV Continuous <Continuous>  dextrose 50% Injectable 25 Gram(s) IV Push once  dextrose 50% Injectable 12.5 Gram(s) IV Push once  dextrose 50% Injectable 25 Gram(s) IV Push once  enoxaparin Injectable 40 milliGRAM(s) SubCutaneous every 24 hours  glucagon  Injectable 1 milliGRAM(s) IntraMuscular once  insulin glargine Injectable (LANTUS) 15 Unit(s) SubCutaneous at bedtime  insulin lispro (ADMELOG) corrective regimen sliding scale   SubCutaneous three times a day before meals  insulin lispro (ADMELOG) corrective regimen sliding scale   SubCutaneous at bedtime  insulin lispro Injectable (ADMELOG) 10 Unit(s) SubCutaneous three times a day before meals  levothyroxine 50 MICROGram(s) Oral daily  losartan 100 milliGRAM(s) Oral daily  pantoprazole    Tablet 40 milliGRAM(s) Oral before breakfast    MEDICATIONS  (PRN):  acetaminophen     Tablet .. 650 milliGRAM(s) Oral every 6 hours PRN Temp greater or equal to 38C (100.4F), Mild Pain (1 - 3)  aluminum hydroxide/magnesium hydroxide/simethicone Suspension 30 milliLiter(s) Oral every 4 hours PRN Dyspepsia  dextrose Oral Gel 15 Gram(s) Oral once PRN Blood Glucose LESS THAN 70 milliGRAM(s)/deciliter  melatonin 3 milliGRAM(s) Oral at bedtime PRN Insomnia  ondansetron Injectable 4 milliGRAM(s) IV Push every 8 hours PRN Nausea and/or Vomiting      RADIOLOGY & ADDITIONAL TESTS:

## 2023-06-29 NOTE — PROGRESS NOTE ADULT - ASSESSMENT
64 y/o male with PMH of DM-1, gastric adenocarcinoma on chemo, hypothyroidism, HTN, HLD came to the ED s/p syncope.  [place] [facility] [place]; while there he felt lightheaded and passed out.  Patient has history of gastric cancer, status post FOLFOX and docetaxel with last dose on 6/16/23.  Labs from today significant for leukopenia WBC count 1.67, hemoglobin 10.5, platelet count 140, sodium 127. ANC yesterday was 1.82.  CT angiogram chest with no evidence of PE.    History of gastric cancer:  -patient on chemotherapy with last dose on 6/16/23 (received FOLFOX and docetaxel)  -WBC count today 1.67, please obtain differentials, and if ANC < 1.0k recommend G- mcg subQ to keep ANC> 1.0k  -all therapy on hold during inpatient stay   -anemia likely multifactorial in a patient on chemo and has history of cancer, p.r.n. PRBC transfusion to keep hemoglobin more than 7    Syncopal episode:  -please obtain neurology and cardiology input  -CT head upon presentation unremarkable    Hyponatremia:  -follow-up nephrology recommendations    Will update [facility] daily 62 y/o male with PMH of DM-1, gastric adenocarcinoma on chemo, hypothyroidism, HTN, HLD came to the ED s/p syncope.  Patient went to [facility] for IVF because he was dehydrated; while there he felt lightheaded and passed out.  Patient has history of gastric cancer, status post FOLFOX and docetaxel with last dose on 6/16/23.  Labs from today significant for leukopenia WBC count 1.67, hemoglobin 10.5, platelet count 140, sodium 127. ANC yesterday was 1.82.  CT angiogram chest with no evidence of PE.    History of gastric cancer:  -patient on chemotherapy with last dose on 6/16/23 (received FOLFOX and docetaxel)  -WBC count today 1.7, please obtain differentials, and if ANC < 1.0k recommend G- mcg subQ to keep ANC> 1.0k  -all therapy on hold during inpatient stay   -anemia likely multifactorial in a patient on chemo and has history of cancer, p.r.n. PRBC transfusion to keep hemoglobin more than 7    Syncopal episode:  -neurology and cardiology input noted.  syncope sec to hypovolumia and DKA - now improved   -CT head upon presentation unremarkable  - TTE pending     Hyponatremia:  -follow-up nephrology recommendations  - off diuretics   - Na today 134    Will update [facility] daily 64 y/o male with PMH of DM-1, gastric adenocarcinoma on chemo, hypothyroidism, HTN, HLD came to the ED s/p syncope.  Patient went to [facility] [place] because he was dehydrated; while there he felt lightheaded and passed out.  Patient has history of gastric cancer, status post FOLFOX and docetaxel with last dose on 6/16/23.  Labs from today significant for leukopenia WBC count 1.67, hemoglobin 10.5, platelet count 140, sodium 127. ANC yesterday was 1.82.  CT angiogram chest with no evidence of PE.    History of gastric cancer:  -patient on chemotherapy with last dose on 6/16/23 (received FOLFOX and docetaxel)  -WBC count today 1.7,  will give one dose Zarxio today   -all therapy on hold during inpatient stay   -anemia likely multifactorial in a patient on chemo and has history of cancer, p.r.n. PRBC transfusion to keep hemoglobin more than 7    Syncopal episode:  -neurology and cardiology input noted.  syncope sec to hypovolumia and DKA - now improved   -CT head upon presentation unremarkable  - TTE pending     Hyponatremia:  -follow-up nephrology recommendations  - off diuretics   - Na today 134    Will update [facility] daily    Nav Nava MD  [facility]  [phone]

## 2023-06-29 NOTE — PROGRESS NOTE ADULT - SUBJECTIVE AND OBJECTIVE BOX
62 y/o male with PMH of DM-1, gastric adenocarcinoma on chemo, hypothyroidism, HTN, HLD came to the ED s/p syncope.  [place] [facility] [place]; while there he felt lightheaded and passed out.  Patient has history of gastric cancer, status post FOLFOX and docetaxel with last dose on 6/16/23.  Labs from today significant for leukopenia WBC count 1.67, hemoglobin 10.5, platelet count 140, sodium 127. ANC yesterday was 1.82.  CT angiogram chest with no evidence of PE.  MEDICATIONS  (STANDING):  atorvastatin 40 milliGRAM(s) Oral at bedtime  cefepime  Injectable. 1000 milliGRAM(s) IV Push every 12 hours  chlorhexidine 2% Cloths 1 Application(s) Topical <User Schedule>  dextrose 5%. 1000 milliLiter(s) (50 mL/Hr) IV Continuous <Continuous>  dextrose 5%. 1000 milliLiter(s) (100 mL/Hr) IV Continuous <Continuous>  dextrose 50% Injectable 25 Gram(s) IV Push once  dextrose 50% Injectable 12.5 Gram(s) IV Push once  dextrose 50% Injectable 25 Gram(s) IV Push once  enoxaparin Injectable 40 milliGRAM(s) SubCutaneous every 24 hours  glucagon  Injectable 1 milliGRAM(s) IntraMuscular once  insulin glargine Injectable (LANTUS) 15 Unit(s) SubCutaneous at bedtime  insulin lispro (ADMELOG) corrective regimen sliding scale   SubCutaneous three times a day before meals  insulin lispro (ADMELOG) corrective regimen sliding scale   SubCutaneous at bedtime  insulin lispro Injectable (ADMELOG) 10 Unit(s) SubCutaneous three times a day before meals  levothyroxine 50 MICROGram(s) Oral daily  losartan 100 milliGRAM(s) Oral daily  pantoprazole    Tablet 40 milliGRAM(s) Oral before breakfast  sodium chloride 0.9% 1000 milliLiter(s) (100 mL/Hr) IV Continuous <Continuous>    MEDICATIONS  (PRN):  acetaminophen     Tablet .. 650 milliGRAM(s) Oral every 6 hours PRN Temp greater or equal to 38C (100.4F), Mild Pain (1 - 3)  aluminum hydroxide/magnesium hydroxide/simethicone Suspension 30 milliLiter(s) Oral every 4 hours PRN Dyspepsia  dextrose Oral Gel 15 Gram(s) Oral once PRN Blood Glucose LESS THAN 70 milliGRAM(s)/deciliter  melatonin 3 milliGRAM(s) Oral at bedtime PRN Insomnia  ondansetron Injectable 4 milliGRAM(s) IV Push every 8 hours PRN Nausea and/or Vomiting    Vital Signs Last 24 Hrs  T(C): 36.7 (06-29-23 @ 04:26), Max: 37.4 (06-28-23 @ 15:38)  T(F): 98.1 (06-29-23 @ 04:26), Max: 99.4 (06-28-23 @ 15:38)  HR: 95 (06-29-23 @ 04:26) (78 - 102)  BP: 135/69 (06-29-23 @ 04:26) (123/73 - 168/73)  BP(mean): --  RR: 18 (06-29-23 @ 04:26) (17 - 18)  SpO2: 98% (06-29-23 @ 04:26) (97% - 100%)    Physical examination:  Alert awake oriented. In no apparent distress, breathing comfortably  HEENT: no cervical lymphadenopathy  Chest:  Clear to auscultation bilaterally  CVS:  No murmur/rubs/gallops; regular rate and rhythm  Abdomen:  Nondistended, nontender, bowel sounds normal, no organomegaly  Extremities:  No pedal edema  Neurological: no focal neurolgical deficit         CBC:                                    10.3   1.79  )-----------( 138      ( 29 Jun 2023 05:15 )             30.9       Chem:         ( 06-28-23 @ 02:30 )    127<L>  |  92<L>  |  14.4  ----------------------------<  470<HH>  4.7   |  13.0<L>  |  1.06    Liver Functions: ( 06-27-23 @ 16:45 )  Alb: 3.3 g/dL / Pro: 6.0 g/dL / ALK PHOS: 90 U/L / ALT: 30 U/L / AST: 39 U/L / GGT: x               62 y/o male with PMH of DM-1, gastric adenocarcinoma on chemo, hypothyroidism, HTN, HLD came to the ED s/p syncope.  Patient went to [facility] [place]F because he was dehydrated; while there he felt lightheaded and passed out.  Patient has history of gastric cancer, status post FOLFOX and docetaxel with last dose on 6/16/23.  Labs from today significant for leukopenia WBC count 1.67, hemoglobin 10.5, platelet count 140, sodium 127. ANC yesterday was 1.82.  CT angiogram chest with no evidence of PE.        MEDICATIONS  (STANDING):  atorvastatin 40 milliGRAM(s) Oral at bedtime  cefepime  Injectable. 1000 milliGRAM(s) IV Push every 12 hours  chlorhexidine 2% Cloths 1 Application(s) Topical <User Schedule>  dextrose 5%. 1000 milliLiter(s) (50 mL/Hr) IV Continuous <Continuous>  dextrose 5%. 1000 milliLiter(s) (100 mL/Hr) IV Continuous <Continuous>  dextrose 50% Injectable 25 Gram(s) IV Push once  dextrose 50% Injectable 12.5 Gram(s) IV Push once  dextrose 50% Injectable 25 Gram(s) IV Push once  enoxaparin Injectable 40 milliGRAM(s) SubCutaneous every 24 hours  glucagon  Injectable 1 milliGRAM(s) IntraMuscular once  insulin glargine Injectable (LANTUS) 15 Unit(s) SubCutaneous at bedtime  insulin lispro (ADMELOG) corrective regimen sliding scale   SubCutaneous three times a day before meals  insulin lispro (ADMELOG) corrective regimen sliding scale   SubCutaneous at bedtime  insulin lispro Injectable (ADMELOG) 10 Unit(s) SubCutaneous three times a day before meals  levothyroxine 50 MICROGram(s) Oral daily  losartan 100 milliGRAM(s) Oral daily  pantoprazole    Tablet 40 milliGRAM(s) Oral before breakfast  sodium chloride 0.9% 1000 milliLiter(s) (100 mL/Hr) IV Continuous <Continuous>    MEDICATIONS  (PRN):  acetaminophen     Tablet .. 650 milliGRAM(s) Oral every 6 hours PRN Temp greater or equal to 38C (100.4F), Mild Pain (1 - 3)  aluminum hydroxide/magnesium hydroxide/simethicone Suspension 30 milliLiter(s) Oral every 4 hours PRN Dyspepsia  dextrose Oral Gel 15 Gram(s) Oral once PRN Blood Glucose LESS THAN 70 milliGRAM(s)/deciliter  melatonin 3 milliGRAM(s) Oral at bedtime PRN Insomnia  ondansetron Injectable 4 milliGRAM(s) IV Push every 8 hours PRN Nausea and/or Vomiting    Vital Signs Last 24 Hrs  T(C): 36.7 (06-29-23 @ 04:26), Max: 37.4 (06-28-23 @ 15:38)  T(F): 98.1 (06-29-23 @ 04:26), Max: 99.4 (06-28-23 @ 15:38)  HR: 95 (06-29-23 @ 04:26) (78 - 102)  BP: 135/69 (06-29-23 @ 04:26) (123/73 - 168/73)  BP(mean): --  RR: 18 (06-29-23 @ 04:26) (17 - 18)  SpO2: 98% (06-29-23 @ 04:26) (97% - 100%)    Physical examination:  Alert awake oriented. In no apparent distress, breathing comfortably  HEENT: no cervical lymphadenopathy  Chest:  Clear to auscultation bilaterally  CVS:  No murmur/rubs/gallops; regular rate and rhythm  Abdomen:  Nondistended, nontender, bowel sounds normal, no organomegaly  Extremities:  No pedal edema  Neurological: no focal neurolgical deficit         CBC:                                    10.3   1.79  )-----------( 138      ( 29 Jun 2023 05:15 )             30.9     06-29    134<L>  |  101  |  12.1  ----------------------------<  243<H>  3.6   |  22.0  |  0.82    Ca    8.0<L>      29 Jun 2023 05:15  Phos  0.9     06-29  Mg     1.9     06-29    TPro  5.7<L>  /  Alb  3.0<L>  /  TBili  0.5  /  DBili  x   /  AST  40<H>  /  ALT  34  /  AlkPhos  88  06-29       62 y/o male with PMH of DM-1, gastric adenocarcinoma on chemo, hypothyroidism, HTN, HLD came to the ED s/p syncope.  [place] [facility] [place]; while there he felt lightheaded and passed out.  Patient has history of gastric cancer, status post FOLFOX and docetaxel with last dose on 6/16/23.  Labs from today significant for leukopenia WBC count 1.67, hemoglobin 10.5, platelet count 140, sodium 127. ANC yesterday was 1.82.  CT angiogram chest with no evidence of PE.    feels better this am   denies bleeding  afebrile         MEDICATIONS  (STANDING):  atorvastatin 40 milliGRAM(s) Oral at bedtime  cefepime  Injectable. 1000 milliGRAM(s) IV Push every 12 hours  chlorhexidine 2% Cloths 1 Application(s) Topical <User Schedule>  dextrose 5%. 1000 milliLiter(s) (50 mL/Hr) IV Continuous <Continuous>  dextrose 5%. 1000 milliLiter(s) (100 mL/Hr) IV Continuous <Continuous>  dextrose 50% Injectable 25 Gram(s) IV Push once  dextrose 50% Injectable 12.5 Gram(s) IV Push once  dextrose 50% Injectable 25 Gram(s) IV Push once  enoxaparin Injectable 40 milliGRAM(s) SubCutaneous every 24 hours  glucagon  Injectable 1 milliGRAM(s) IntraMuscular once  insulin glargine Injectable (LANTUS) 15 Unit(s) SubCutaneous at bedtime  insulin lispro (ADMELOG) corrective regimen sliding scale   SubCutaneous three times a day before meals  insulin lispro (ADMELOG) corrective regimen sliding scale   SubCutaneous at bedtime  insulin lispro Injectable (ADMELOG) 10 Unit(s) SubCutaneous three times a day before meals  levothyroxine 50 MICROGram(s) Oral daily  losartan 100 milliGRAM(s) Oral daily  pantoprazole    Tablet 40 milliGRAM(s) Oral before breakfast  sodium chloride 0.9% 1000 milliLiter(s) (100 mL/Hr) IV Continuous <Continuous>    MEDICATIONS  (PRN):  acetaminophen     Tablet .. 650 milliGRAM(s) Oral every 6 hours PRN Temp greater or equal to 38C (100.4F), Mild Pain (1 - 3)  aluminum hydroxide/magnesium hydroxide/simethicone Suspension 30 milliLiter(s) Oral every 4 hours PRN Dyspepsia  dextrose Oral Gel 15 Gram(s) Oral once PRN Blood Glucose LESS THAN 70 milliGRAM(s)/deciliter  melatonin 3 milliGRAM(s) Oral at bedtime PRN Insomnia  ondansetron Injectable 4 milliGRAM(s) IV Push every 8 hours PRN Nausea and/or Vomiting    Vital Signs Last 24 Hrs  T(C): 36.7 (06-29-23 @ 04:26), Max: 37.4 (06-28-23 @ 15:38)  T(F): 98.1 (06-29-23 @ 04:26), Max: 99.4 (06-28-23 @ 15:38)  HR: 95 (06-29-23 @ 04:26) (78 - 102)  BP: 135/69 (06-29-23 @ 04:26) (123/73 - 168/73)  BP(mean): --  RR: 18 (06-29-23 @ 04:26) (17 - 18)  SpO2: 98% (06-29-23 @ 04:26) (97% - 100%)    Physical examination:  Alert awake oriented. In no apparent distress, breathing comfortably  HEENT: no cervical lymphadenopathy  Chest:  Clear to auscultation bilaterally  CVS:  No murmur/rubs/gallops; regular rate and rhythm  Abdomen:  Nondistended, nontender, bowel sounds normal, no organomegaly  Extremities:  No pedal edema  Neurological: no focal neurolgical deficit         CBC:                                    10.3   1.79  )-----------( 138      ( 29 Jun 2023 05:15 )             30.9     06-29    134<L>  |  101  |  12.1  ----------------------------<  243<H>  3.6   |  22.0  |  0.82    Ca    8.0<L>      29 Jun 2023 05:15  Phos  0.9     06-29  Mg     1.9     06-29    TPro  5.7<L>  /  Alb  3.0<L>  /  TBili  0.5  /  DBili  x   /  AST  40<H>  /  ALT  34  /  AlkPhos  88  06-29

## 2023-06-29 NOTE — PATIENT PROFILE ADULT - FUNCTIONAL SCREEN CURRENT LEVEL: COMMUNICATION, MLM
0 = understands/communicates without difficulty You can access the FollowMyHealth Patient Portal offered by [facility] by registering at the following website: [email or web address]. By joining [facility]’s FollowMyHealth portal, you will also be able to view your health information using other applications (apps) compatible with our system.

## 2023-06-30 ENCOUNTER — TRANSCRIPTION ENCOUNTER (OUTPATIENT)
Age: 63
End: 2023-06-30

## 2023-06-30 LAB
ALBUMIN SERPL ELPH-MCNC: 3 G/DL — LOW (ref 3.3–5.2)
ALP SERPL-CCNC: 94 U/L — SIGNIFICANT CHANGE UP (ref 40–120)
ALT FLD-CCNC: 46 U/L — HIGH
ANION GAP SERPL CALC-SCNC: 10 MMOL/L — SIGNIFICANT CHANGE UP (ref 5–17)
AST SERPL-CCNC: 51 U/L — HIGH
BASOPHILS # BLD AUTO: 0.02 K/UL — SIGNIFICANT CHANGE UP (ref 0–0.2)
BASOPHILS NFR BLD AUTO: 0.4 % — SIGNIFICANT CHANGE UP (ref 0–2)
BILIRUB SERPL-MCNC: 0.4 MG/DL — SIGNIFICANT CHANGE UP (ref 0.4–2)
BUN SERPL-MCNC: 9 MG/DL — SIGNIFICANT CHANGE UP (ref 8–20)
CALCIUM SERPL-MCNC: 8 MG/DL — LOW (ref 8.4–10.5)
CHLORIDE SERPL-SCNC: 99 MMOL/L — SIGNIFICANT CHANGE UP (ref 96–108)
CO2 SERPL-SCNC: 23 MMOL/L — SIGNIFICANT CHANGE UP (ref 22–29)
CREAT SERPL-MCNC: 0.61 MG/DL — SIGNIFICANT CHANGE UP (ref 0.5–1.3)
EGFR: 108 ML/MIN/1.73M2 — SIGNIFICANT CHANGE UP
EOSINOPHIL # BLD AUTO: 0.02 K/UL — SIGNIFICANT CHANGE UP (ref 0–0.5)
EOSINOPHIL NFR BLD AUTO: 0.4 % — SIGNIFICANT CHANGE UP (ref 0–6)
GLUCOSE BLDC GLUCOMTR-MCNC: 123 MG/DL — HIGH (ref 70–99)
GLUCOSE BLDC GLUCOMTR-MCNC: 142 MG/DL — HIGH (ref 70–99)
GLUCOSE BLDC GLUCOMTR-MCNC: 162 MG/DL — HIGH (ref 70–99)
GLUCOSE BLDC GLUCOMTR-MCNC: 172 MG/DL — HIGH (ref 70–99)
GLUCOSE SERPL-MCNC: 169 MG/DL — HIGH (ref 70–99)
HCT VFR BLD CALC: 31 % — LOW (ref 39–50)
HGB BLD-MCNC: 10.6 G/DL — LOW (ref 13–17)
IMM GRANULOCYTES NFR BLD AUTO: 1 % — HIGH (ref 0–0.9)
LYMPHOCYTES # BLD AUTO: 1.1 K/UL — SIGNIFICANT CHANGE UP (ref 1–3.3)
LYMPHOCYTES # BLD AUTO: 22.9 % — SIGNIFICANT CHANGE UP (ref 13–44)
MAGNESIUM SERPL-MCNC: 1.9 MG/DL — SIGNIFICANT CHANGE UP (ref 1.8–2.6)
MCHC RBC-ENTMCNC: 26.6 PG — LOW (ref 27–34)
MCHC RBC-ENTMCNC: 34.2 GM/DL — SIGNIFICANT CHANGE UP (ref 32–36)
MCV RBC AUTO: 77.7 FL — LOW (ref 80–100)
MONOCYTES # BLD AUTO: 0.45 K/UL — SIGNIFICANT CHANGE UP (ref 0–0.9)
MONOCYTES NFR BLD AUTO: 9.4 % — SIGNIFICANT CHANGE UP (ref 2–14)
NEUTROPHILS # BLD AUTO: 3.17 K/UL — SIGNIFICANT CHANGE UP (ref 1.8–7.4)
NEUTROPHILS NFR BLD AUTO: 65.9 % — SIGNIFICANT CHANGE UP (ref 43–77)
PHOSPHATE SERPL-MCNC: 1.3 MG/DL — LOW (ref 2.4–4.7)
PLATELET # BLD AUTO: 110 K/UL — LOW (ref 150–400)
POTASSIUM SERPL-MCNC: 3.1 MMOL/L — LOW (ref 3.5–5.3)
POTASSIUM SERPL-SCNC: 3.1 MMOL/L — LOW (ref 3.5–5.3)
PROT SERPL-MCNC: 5.5 G/DL — LOW (ref 6.6–8.7)
RBC # BLD: 3.99 M/UL — LOW (ref 4.2–5.8)
RBC # FLD: 15.5 % — HIGH (ref 10.3–14.5)
SODIUM SERPL-SCNC: 132 MMOL/L — LOW (ref 135–145)
WBC # BLD: 4.81 K/UL — SIGNIFICANT CHANGE UP (ref 3.8–10.5)
WBC # FLD AUTO: 4.81 K/UL — SIGNIFICANT CHANGE UP (ref 3.8–10.5)

## 2023-06-30 PROCEDURE — 84100 ASSAY OF PHOSPHORUS: CPT

## 2023-06-30 PROCEDURE — 81001 URINALYSIS AUTO W/SCOPE: CPT

## 2023-06-30 PROCEDURE — 83690 ASSAY OF LIPASE: CPT

## 2023-06-30 PROCEDURE — 99239 HOSP IP/OBS DSCHRG MGMT >30: CPT

## 2023-06-30 PROCEDURE — 82962 GLUCOSE BLOOD TEST: CPT

## 2023-06-30 PROCEDURE — 87040 BLOOD CULTURE FOR BACTERIA: CPT

## 2023-06-30 PROCEDURE — 86900 BLOOD TYPING SEROLOGIC ABO: CPT

## 2023-06-30 PROCEDURE — 83735 ASSAY OF MAGNESIUM: CPT

## 2023-06-30 PROCEDURE — 85027 COMPLETE CBC AUTOMATED: CPT

## 2023-06-30 PROCEDURE — 84484 ASSAY OF TROPONIN QUANT: CPT

## 2023-06-30 PROCEDURE — 86850 RBC ANTIBODY SCREEN: CPT

## 2023-06-30 PROCEDURE — 80053 COMPREHEN METABOLIC PANEL: CPT

## 2023-06-30 PROCEDURE — 96374 THER/PROPH/DIAG INJ IV PUSH: CPT

## 2023-06-30 PROCEDURE — 80048 BASIC METABOLIC PNL TOTAL CA: CPT

## 2023-06-30 PROCEDURE — 86901 BLOOD TYPING SEROLOGIC RH(D): CPT

## 2023-06-30 PROCEDURE — 83930 ASSAY OF BLOOD OSMOLALITY: CPT

## 2023-06-30 PROCEDURE — 96375 TX/PRO/DX INJ NEW DRUG ADDON: CPT

## 2023-06-30 PROCEDURE — 83605 ASSAY OF LACTIC ACID: CPT

## 2023-06-30 PROCEDURE — C8929: CPT

## 2023-06-30 PROCEDURE — 87086 URINE CULTURE/COLONY COUNT: CPT

## 2023-06-30 PROCEDURE — 0225U NFCT DS DNA&RNA 21 SARSCOV2: CPT

## 2023-06-30 PROCEDURE — 96376 TX/PRO/DX INJ SAME DRUG ADON: CPT

## 2023-06-30 PROCEDURE — 36415 COLL VENOUS BLD VENIPUNCTURE: CPT

## 2023-06-30 PROCEDURE — 99285 EMERGENCY DEPT VISIT HI MDM: CPT | Mod: 25

## 2023-06-30 PROCEDURE — 86803 HEPATITIS C AB TEST: CPT

## 2023-06-30 PROCEDURE — 85025 COMPLETE CBC W/AUTO DIFF WBC: CPT

## 2023-06-30 PROCEDURE — 83935 ASSAY OF URINE OSMOLALITY: CPT

## 2023-06-30 PROCEDURE — 87640 STAPH A DNA AMP PROBE: CPT

## 2023-06-30 PROCEDURE — 83036 HEMOGLOBIN GLYCOSYLATED A1C: CPT

## 2023-06-30 PROCEDURE — 87641 MR-STAPH DNA AMP PROBE: CPT

## 2023-06-30 PROCEDURE — 84295 ASSAY OF SERUM SODIUM: CPT

## 2023-06-30 PROCEDURE — 84443 ASSAY THYROID STIM HORMONE: CPT

## 2023-06-30 PROCEDURE — 82009 KETONE BODYS QUAL: CPT

## 2023-06-30 PROCEDURE — 93005 ELECTROCARDIOGRAM TRACING: CPT

## 2023-06-30 PROCEDURE — 84300 ASSAY OF URINE SODIUM: CPT

## 2023-06-30 RX ORDER — POTASSIUM CHLORIDE 20 MEQ
40 PACKET (EA) ORAL ONCE
Refills: 0 | Status: COMPLETED | OUTPATIENT
Start: 2023-06-30 | End: 2023-06-30

## 2023-06-30 RX ORDER — SIMETHICONE 80 MG/1
1 TABLET, CHEWABLE ORAL
Qty: 0 | Refills: 0 | DISCHARGE
Start: 2023-06-30

## 2023-06-30 RX ORDER — INSULIN GLULISINE 100 [IU]/ML
10 INJECTION, SOLUTION SUBCUTANEOUS
Refills: 0 | DISCHARGE

## 2023-06-30 RX ORDER — POTASSIUM PHOSPHATE, MONOBASIC POTASSIUM PHOSPHATE, DIBASIC 236; 224 MG/ML; MG/ML
30 INJECTION, SOLUTION INTRAVENOUS ONCE
Refills: 0 | Status: COMPLETED | OUTPATIENT
Start: 2023-06-30 | End: 2023-06-30

## 2023-06-30 RX ORDER — HYDROCHLOROTHIAZIDE 25 MG
1 TABLET ORAL
Refills: 0 | DISCHARGE

## 2023-06-30 RX ADMIN — Medication 40 MILLIEQUIVALENT(S): at 09:25

## 2023-06-30 RX ADMIN — Medication 50 MICROGRAM(S): at 05:14

## 2023-06-30 RX ADMIN — Medication 10 UNIT(S): at 17:03

## 2023-06-30 RX ADMIN — LOSARTAN POTASSIUM 100 MILLIGRAM(S): 100 TABLET, FILM COATED ORAL at 05:14

## 2023-06-30 RX ADMIN — PANTOPRAZOLE SODIUM 40 MILLIGRAM(S): 20 TABLET, DELAYED RELEASE ORAL at 06:09

## 2023-06-30 RX ADMIN — CHLORHEXIDINE GLUCONATE 1 APPLICATION(S): 213 SOLUTION TOPICAL at 05:17

## 2023-06-30 RX ADMIN — CEFEPIME 1000 MILLIGRAM(S): 1 INJECTION, POWDER, FOR SOLUTION INTRAMUSCULAR; INTRAVENOUS at 07:33

## 2023-06-30 RX ADMIN — POTASSIUM PHOSPHATE, MONOBASIC POTASSIUM PHOSPHATE, DIBASIC 83.33 MILLIMOLE(S): 236; 224 INJECTION, SOLUTION INTRAVENOUS at 09:25

## 2023-06-30 RX ADMIN — Medication 10 UNIT(S): at 07:34

## 2023-06-30 RX ADMIN — Medication 1: at 07:33

## 2023-06-30 RX ADMIN — ENOXAPARIN SODIUM 40 MILLIGRAM(S): 100 INJECTION SUBCUTANEOUS at 05:14

## 2023-06-30 RX ADMIN — Medication 1: at 11:08

## 2023-06-30 RX ADMIN — Medication 10 UNIT(S): at 11:09

## 2023-06-30 RX ADMIN — Medication 300 UNIT(S): at 17:14

## 2023-06-30 NOTE — DISCHARGE NOTE PROVIDER - ATTENDING DISCHARGE PHYSICAL EXAMINATION:
Vital Signs Last 24 Hrs  T(C): 37.1 (30 Jun 2023 11:29), Max: 37.4 (29 Jun 2023 16:42)  T(F): 98.8 (30 Jun 2023 11:29), Max: 99.3 (29 Jun 2023 16:42)  HR: 84 (30 Jun 2023 11:29) (79 - 103)  BP: 145/75 (30 Jun 2023 11:29) (110/69 - 160/74)  BP(mean): --  RR: 18 (30 Jun 2023 11:29) (17 - 18)  SpO2: 98% (30 Jun 2023 11:29) (96% - 98%)    Parameters below as of 30 Jun 2023 11:29  Patient On (Oxygen Delivery Method): room air    GENERAL: NAD  HEENT: +EOMI  NECK: soft, supple  CHEST/LUNG: Clear to auscultation bilaterally  HEART: S1S2+, Regular rate and rhythm  ABDOMEN: Soft, Nontender, Nondistended; Bowel sounds present  SKIN: warm, dry  NEURO: AAOX3, grossly non-focal   PSYCH: normal affect

## 2023-06-30 NOTE — DISCHARGE NOTE NURSING/CASE MANAGEMENT/SOCIAL WORK - PATIENT PORTAL LINK FT
You can access the FollowMyHealth Patient Portal offered by [facility] by registering at the following website: [email or web address]. By joining [facility]’s FollowMyHealth portal, you will also be able to view your health information using other applications (apps) compatible with our system.

## 2023-06-30 NOTE — DISCHARGE NOTE PROVIDER - PROVIDER TOKENS
PROVIDER:[TOKEN:[[id number]]],FREE:[LAST:[PMD],PHONE:[(   )    -],FAX:[(   )    -]],FREE:[LAST:[Oncology],PHONE:[(   )    -],FAX:[(   )    -]],PROVIDER:[TOKEN:[[id number]]]

## 2023-06-30 NOTE — PROGRESS NOTE ADULT - SUBJECTIVE AND OBJECTIVE BOX
[place] CARDIOVASCULAR - [facility], THE HEART CENTER                                   [address] [place] [address]                                                      PHONE: [phone]                                                         FAX: [phone]  [email or web address]  ---------------------------------------------------------------------------------------------------------------------------------    Overnight events/patient complaints:  no events     PAST MEDICAL & SURGICAL HISTORY:  IDDM (insulin dependent diabetes mellitus)      HTN (hypertension)      DM (diabetes mellitus)      Hypothyroidism      HTN (hypertension)      HLD (hyperlipidemia)      Lesion of stomach      No significant past surgical history          Seafood (Hives; Rash)  Altace (Hives)    MEDICATIONS  (STANDING):  atorvastatin 40 milliGRAM(s) Oral at bedtime  cefepime  Injectable. 1000 milliGRAM(s) IV Push every 12 hours  chlorhexidine 2% Cloths 1 Application(s) Topical <User Schedule>  dextrose 5%. 1000 milliLiter(s) (100 mL/Hr) IV Continuous <Continuous>  dextrose 5%. 1000 milliLiter(s) (50 mL/Hr) IV Continuous <Continuous>  dextrose 50% Injectable 25 Gram(s) IV Push once  dextrose 50% Injectable 12.5 Gram(s) IV Push once  dextrose 50% Injectable 25 Gram(s) IV Push once  enoxaparin Injectable 40 milliGRAM(s) SubCutaneous every 24 hours  glucagon  Injectable 1 milliGRAM(s) IntraMuscular once  insulin glargine Injectable (LANTUS) 15 Unit(s) SubCutaneous at bedtime  insulin lispro (ADMELOG) corrective regimen sliding scale   SubCutaneous three times a day before meals  insulin lispro (ADMELOG) corrective regimen sliding scale   SubCutaneous at bedtime  insulin lispro Injectable (ADMELOG) 10 Unit(s) SubCutaneous three times a day before meals  levothyroxine 50 MICROGram(s) Oral daily  losartan 100 milliGRAM(s) Oral daily  pantoprazole    Tablet 40 milliGRAM(s) Oral before breakfast  potassium chloride    Tablet ER 40 milliEquivalent(s) Oral once  potassium phosphate IVPB 30 milliMole(s) IV Intermittent once    MEDICATIONS  (PRN):  acetaminophen     Tablet .. 650 milliGRAM(s) Oral every 6 hours PRN Temp greater or equal to 38C (100.4F), Mild Pain (1 - 3)  aluminum hydroxide/magnesium hydroxide/simethicone Suspension 30 milliLiter(s) Oral every 4 hours PRN Dyspepsia  dextrose Oral Gel 15 Gram(s) Oral once PRN Blood Glucose LESS THAN 70 milliGRAM(s)/deciliter  melatonin 3 milliGRAM(s) Oral at bedtime PRN Insomnia  ondansetron Injectable 4 milliGRAM(s) IV Push every 8 hours PRN Nausea and/or Vomiting  simethicone 80 milliGRAM(s) Chew three times a day PRN Gas      Vital Signs Last 24 Hrs  T(C): 37.1 (30 Jun 2023 04:51), Max: 37.4 (29 Jun 2023 16:42)  T(F): 98.7 (30 Jun 2023 04:51), Max: 99.3 (29 Jun 2023 16:42)  HR: 84 (30 Jun 2023 04:51) (79 - 103)  BP: 150/72 (30 Jun 2023 04:51) (110/69 - 160/74)  BP(mean): --  RR: 18 (30 Jun 2023 04:51) (17 - 18)  SpO2: 96% (30 Jun 2023 04:51) (96% - 97%)    Parameters below as of 30 Jun 2023 04:51  Patient On (Oxygen Delivery Method): room air      ICU Vital Signs Last 24 Hrs  CAROLANN GALEANO  I&O's Detail    I&O's Summary    Drug Dosing Weight  CAROLANN GALEANO      PHYSICAL EXAM:  General: Appears well developed, well nourished alert and cooperative.  HEENT: Head; normocephalic, atraumatic.  Eyes: Pupils reactive, cornea wnl.  Neck: Supple, no nodes adenopathy, no NVD or carotid bruit or thyromegaly.  CARDIOVASCULAR: Normal S1 and S2, No murmur, rub, gallop or lift.   LUNGS: No rales, rhonchi or wheeze. Normal breath sounds bilaterally.  ABDOMEN: Soft, nontender without mass or organomegaly. bowel sounds normoactive.  EXTREMITIES: No clubbing, cyanosis or edema. Distal pulses wnl.   SKIN: warm and dry with normal turgor.  NEURO: Alert/oriented x 3/normal motor exam. No pathologic reflexes.    PSYCH: normal affect.        LABS:                        10.6   4.81  )-----------( 110      ( 30 Jun 2023 06:27 )             31.0     06-30    132<L>  |  99  |  9.0  ----------------------------<  169<H>  3.1<L>   |  23.0  |  0.61    Ca    8.0<L>      30 Jun 2023 06:27  Phos  1.3     06-30  Mg     1.9     06-30    TPro  5.5<L>  /  Alb  3.0<L>  /  TBili  0.4  /  DBili  x   /  AST  51<H>  /  ALT  46<H>  /  AlkPhos  94  06-30    CAROLANN GALEANO  CARDIAC MARKERS ( 28 Jun 2023 10:25 )  x     / <0.01 ng/mL / x     / x     / x            Urinalysis Basic - ( 30 Jun 2023 06:27 )    Color: x / Appearance: x / SG: x / pH: x  Gluc: 169 mg/dL / Ketone: x  / Bili: x / Urobili: x   Blood: x / Protein: x / Nitrite: x   Leuk Esterase: x / RBC: x / WBC x   Sq Epi: x / Non Sq Epi: x / Bacteria: x      In summary, CAROLANN GALEANO is a 63y Male with past medical history significant for HLD. HTN. type II DM, gastric adenocarcinoma status post FOLFOX and docetaxel with last dose on 6/16/23, and hypothyroidism, who presents to [facility] with postural dizziness and syncope in the setting of n/v and decreased PO intake, additionally noted to hyperglycemic with metabolic acidosis     Syncope  -etiology- vasovagal  -PO fluid intake further stressed to pt  -echo- unremarkable  -no further cardiac interventions  -will have to f/u in office   -pt stable for discharge from a cardiac perspective       Thank you for allowing [facility] to participate in the care of this patient.  Please feel free to call with any questions or concerns.                  [place] CARDIOVASCULAR - [facility], THE HEART CENTER                                   [address] [place] [address]                                                      PHONE: [phone]                                                         FAX: [phone]  [email or web address]  ---------------------------------------------------------------------------------------------------------------------------------    Overnight events/patient complaints:  no events     PAST MEDICAL & SURGICAL HISTORY:  IDDM (insulin dependent diabetes mellitus)      HTN (hypertension)      DM (diabetes mellitus)      Hypothyroidism      HTN (hypertension)      HLD (hyperlipidemia)      Lesion of stomach      No significant past surgical history          Seafood (Hives; Rash)  Altace (Hives)    MEDICATIONS  (STANDING):  atorvastatin 40 milliGRAM(s) Oral at bedtime  cefepime  Injectable. 1000 milliGRAM(s) IV Push every 12 hours  chlorhexidine 2% Cloths 1 Application(s) Topical <User Schedule>  dextrose 5%. 1000 milliLiter(s) (100 mL/Hr) IV Continuous <Continuous>  dextrose 5%. 1000 milliLiter(s) (50 mL/Hr) IV Continuous <Continuous>  dextrose 50% Injectable 25 Gram(s) IV Push once  dextrose 50% Injectable 12.5 Gram(s) IV Push once  dextrose 50% Injectable 25 Gram(s) IV Push once  enoxaparin Injectable 40 milliGRAM(s) SubCutaneous every 24 hours  glucagon  Injectable 1 milliGRAM(s) IntraMuscular once  insulin glargine Injectable (LANTUS) 15 Unit(s) SubCutaneous at bedtime  insulin lispro (ADMELOG) corrective regimen sliding scale   SubCutaneous three times a day before meals  insulin lispro (ADMELOG) corrective regimen sliding scale   SubCutaneous at bedtime  insulin lispro Injectable (ADMELOG) 10 Unit(s) SubCutaneous three times a day before meals  levothyroxine 50 MICROGram(s) Oral daily  losartan 100 milliGRAM(s) Oral daily  pantoprazole    Tablet 40 milliGRAM(s) Oral before breakfast  potassium chloride    Tablet ER 40 milliEquivalent(s) Oral once  potassium phosphate IVPB 30 milliMole(s) IV Intermittent once    MEDICATIONS  (PRN):  acetaminophen     Tablet .. 650 milliGRAM(s) Oral every 6 hours PRN Temp greater or equal to 38C (100.4F), Mild Pain (1 - 3)  aluminum hydroxide/magnesium hydroxide/simethicone Suspension 30 milliLiter(s) Oral every 4 hours PRN Dyspepsia  dextrose Oral Gel 15 Gram(s) Oral once PRN Blood Glucose LESS THAN 70 milliGRAM(s)/deciliter  melatonin 3 milliGRAM(s) Oral at bedtime PRN Insomnia  ondansetron Injectable 4 milliGRAM(s) IV Push every 8 hours PRN Nausea and/or Vomiting  simethicone 80 milliGRAM(s) Chew three times a day PRN Gas      Vital Signs Last 24 Hrs  T(C): 37.1 (30 Jun 2023 04:51), Max: 37.4 (29 Jun 2023 16:42)  T(F): 98.7 (30 Jun 2023 04:51), Max: 99.3 (29 Jun 2023 16:42)  HR: 84 (30 Jun 2023 04:51) (79 - 103)  BP: 150/72 (30 Jun 2023 04:51) (110/69 - 160/74)  BP(mean): --  RR: 18 (30 Jun 2023 04:51) (17 - 18)  SpO2: 96% (30 Jun 2023 04:51) (96% - 97%)    Parameters below as of 30 Jun 2023 04:51  Patient On (Oxygen Delivery Method): room air      ICU Vital Signs Last 24 Hrs  CAROLANN GALEANO  I&O's Detail    I&O's Summary    Drug Dosing Weight  CAROLANN GALEANO      PHYSICAL EXAM:  General: Appears well developed, well nourished alert and cooperative.  HEENT: Head; normocephalic, atraumatic.  Eyes: Pupils reactive, cornea wnl.  Neck: Supple, no nodes adenopathy, no NVD or carotid bruit or thyromegaly.  CARDIOVASCULAR: Normal S1 and S2, No murmur, rub, gallop or lift.   LUNGS: No rales, rhonchi or wheeze. Normal breath sounds bilaterally.  ABDOMEN: Soft, nontender without mass or organomegaly. bowel sounds normoactive.  EXTREMITIES: No clubbing, cyanosis or edema. Distal pulses wnl.   SKIN: warm and dry with normal turgor.  NEURO: Alert/oriented x 3/normal motor exam. No pathologic reflexes.    PSYCH: normal affect.        LABS:                        10.6   4.81  )-----------( 110      ( 30 Jun 2023 06:27 )             31.0     06-30    132<L>  |  99  |  9.0  ----------------------------<  169<H>  3.1<L>   |  23.0  |  0.61    Ca    8.0<L>      30 Jun 2023 06:27  Phos  1.3     06-30  Mg     1.9     06-30    TPro  5.5<L>  /  Alb  3.0<L>  /  TBili  0.4  /  DBili  x   /  AST  51<H>  /  ALT  46<H>  /  AlkPhos  94  06-30    CAROLANN GALEANO  CARDIAC MARKERS ( 28 Jun 2023 10:25 )  x     / <0.01 ng/mL / x     / x     / x            Urinalysis Basic - ( 30 Jun 2023 06:27 )    Color: x / Appearance: x / SG: x / pH: x  Gluc: 169 mg/dL / Ketone: x  / Bili: x / Urobili: x   Blood: x / Protein: x / Nitrite: x   Leuk Esterase: x / RBC: x / WBC x   Sq Epi: x / Non Sq Epi: x / Bacteria: x      In summary, CAROLANN GALEANO is a 63y Male with past medical history significant for HLD. HTN. type II DM, gastric adenocarcinoma status post FOLFOX and docetaxel with last dose on 6/16/23, and hypothyroidism, who presents to [facility] with postural dizziness and syncope in the setting of n/v and decreased PO intake, additionally noted to hyperglycemic with metabolic acidosis     Syncope  -etiology- vasovagal  -PO fluid intake further stressed to pt  -echo- unremarkable  -no further cardiac interventions  -will have to f/u in office   -pt stable for discharge from a cardiac perspective     Thank you for allowing [facility] to participate in the care of this patient.  Please feel free to call with any questions or concerns.

## 2023-06-30 NOTE — PROGRESS NOTE ADULT - SUBJECTIVE AND OBJECTIVE BOX
62 y/o male with PMH of DM-1, gastric adenocarcinoma on chemo, hypothyroidism, HTN, HLD came to the ED s/p syncope.  Patient went to [facility] for [place]F because he was dehydrated; while there he felt lightheaded and passed out.  Patient has history of gastric cancer, status post FOLFOX and docetaxel with last dose on 6/16/23.  Labs from today significant for leukopenia WBC count 1.67, hemoglobin 10.5, platelet count 140, sodium 127. ANC yesterday was 1.82.  CT angiogram chest with no evidence of PE.    feels better this am   denies bleeding  afebrile     MEDICATIONS  (STANDING):  atorvastatin 40 milliGRAM(s) Oral at bedtime  cefepime  Injectable. 1000 milliGRAM(s) IV Push every 12 hours  chlorhexidine 2% Cloths 1 Application(s) Topical <User Schedule>  dextrose 5%. 1000 milliLiter(s) (50 mL/Hr) IV Continuous <Continuous>  dextrose 5%. 1000 milliLiter(s) (100 mL/Hr) IV Continuous <Continuous>  dextrose 50% Injectable 25 Gram(s) IV Push once  dextrose 50% Injectable 12.5 Gram(s) IV Push once  dextrose 50% Injectable 25 Gram(s) IV Push once  enoxaparin Injectable 40 milliGRAM(s) SubCutaneous every 24 hours  glucagon  Injectable 1 milliGRAM(s) IntraMuscular once  insulin glargine Injectable (LANTUS) 15 Unit(s) SubCutaneous at bedtime  insulin lispro (ADMELOG) corrective regimen sliding scale   SubCutaneous three times a day before meals  insulin lispro (ADMELOG) corrective regimen sliding scale   SubCutaneous at bedtime  insulin lispro Injectable (ADMELOG) 10 Unit(s) SubCutaneous three times a day before meals  levothyroxine 50 MICROGram(s) Oral daily  losartan 100 milliGRAM(s) Oral daily  pantoprazole    Tablet 40 milliGRAM(s) Oral before breakfast    MEDICATIONS  (PRN):  acetaminophen     Tablet .. 650 milliGRAM(s) Oral every 6 hours PRN Temp greater or equal to 38C (100.4F), Mild Pain (1 - 3)  aluminum hydroxide/magnesium hydroxide/simethicone Suspension 30 milliLiter(s) Oral every 4 hours PRN Dyspepsia  dextrose Oral Gel 15 Gram(s) Oral once PRN Blood Glucose LESS THAN 70 milliGRAM(s)/deciliter  melatonin 3 milliGRAM(s) Oral at bedtime PRN Insomnia  ondansetron Injectable 4 milliGRAM(s) IV Push every 8 hours PRN Nausea and/or Vomiting  simethicone 80 milliGRAM(s) Chew three times a day PRN Gas      Vital Signs Last 24 Hrs  T(C): 37.1 (06-30-23 @ 11:29), Max: 37.4 (06-29-23 @ 16:42)  T(F): 98.8 (06-30-23 @ 11:29), Max: 99.3 (06-29-23 @ 16:42)  HR: 84 (06-30-23 @ 11:29) (79 - 103)  BP: 145/75 (06-30-23 @ 11:29) (110/69 - 160/74)  BP(mean): --  RR: 18 (06-30-23 @ 11:29) (17 - 18)  SpO2: 98% (06-30-23 @ 11:29) (96% - 98%)    Orthostatic VS  06-29-23 @ 16:27  Lying BP: 154/75 HR: 89  Sitting BP: 147/76 HR: 95  Standing BP: 110/69 HR: 99  Site: --  Mode: --        Physical examination:  Alert awake oriented. In no apparent distress, breathing comfortably  HEENT: no cervical lymphadenopathy  Chest:  Clear to auscultation bilaterally  CVS:  No murmur/rubs/gallops; regular rate and rhythm  Abdomen:  Nondistended, nontender, bowel sounds normal, no organomegaly  Extremities:  No pedal edema  Neurological: no focal neurolgical deficit         CBC:                                        10.6   4.81  )-----------( 110      ( 30 Jun 2023 06:27 )             31.0                           10.3   1.79  )-----------( 138      ( 29 Jun 2023 05:15 )             30.9     06-29    134<L>  |  101  |  12.1  ----------------------------<  243<H>  3.6   |  22.0  |  0.82    Ca    8.0<L>      29 Jun 2023 05:15  Phos  0.9     06-29  Mg     1.9     06-29    TPro  5.7<L>  /  Alb  3.0<L>  /  TBili  0.5  /  DBili  x   /  AST  40<H>  /  ALT  34  /  AlkPhos  88  06-29       62 y/o male with PMH of DM-1, gastric adenocarcinoma on chemo, hypothyroidism, HTN, HLD came to the ED s/p syncope.  Patient went to [facility] for IVF because he was dehydrated; while there he felt lightheaded and passed out.  Patient has history of gastric cancer, status post FOLFOX and docetaxel with last dose on 6/16/23.  Labs from today significant for leukopenia WBC count 1.67, hemoglobin 10.5, platelet count 140, sodium 127. ANC yesterday was 1.82.  CT angiogram chest with no evidence of PE.    feels better this am   denies bleeding  afebrile   wbc 4.8  post neupogen x 1     MEDICATIONS  (STANDING):  atorvastatin 40 milliGRAM(s) Oral at bedtime  cefepime  Injectable. 1000 milliGRAM(s) IV Push every 12 hours  chlorhexidine 2% Cloths 1 Application(s) Topical <User Schedule>  dextrose 5%. 1000 milliLiter(s) (50 mL/Hr) IV Continuous <Continuous>  dextrose 5%. 1000 milliLiter(s) (100 mL/Hr) IV Continuous <Continuous>  dextrose 50% Injectable 25 Gram(s) IV Push once  dextrose 50% Injectable 12.5 Gram(s) IV Push once  dextrose 50% Injectable 25 Gram(s) IV Push once  enoxaparin Injectable 40 milliGRAM(s) SubCutaneous every 24 hours  glucagon  Injectable 1 milliGRAM(s) IntraMuscular once  insulin glargine Injectable (LANTUS) 15 Unit(s) SubCutaneous at bedtime  insulin lispro (ADMELOG) corrective regimen sliding scale   SubCutaneous three times a day before meals  insulin lispro (ADMELOG) corrective regimen sliding scale   SubCutaneous at bedtime  insulin lispro Injectable (ADMELOG) 10 Unit(s) SubCutaneous three times a day before meals  levothyroxine 50 MICROGram(s) Oral daily  losartan 100 milliGRAM(s) Oral daily  pantoprazole    Tablet 40 milliGRAM(s) Oral before breakfast    MEDICATIONS  (PRN):  acetaminophen     Tablet .. 650 milliGRAM(s) Oral every 6 hours PRN Temp greater or equal to 38C (100.4F), Mild Pain (1 - 3)  aluminum hydroxide/magnesium hydroxide/simethicone Suspension 30 milliLiter(s) Oral every 4 hours PRN Dyspepsia  dextrose Oral Gel 15 Gram(s) Oral once PRN Blood Glucose LESS THAN 70 milliGRAM(s)/deciliter  melatonin 3 milliGRAM(s) Oral at bedtime PRN Insomnia  ondansetron Injectable 4 milliGRAM(s) IV Push every 8 hours PRN Nausea and/or Vomiting  simethicone 80 milliGRAM(s) Chew three times a day PRN Gas      Vital Signs Last 24 Hrs  T(C): 37.1 (06-30-23 @ 11:29), Max: 37.4 (06-29-23 @ 16:42)  T(F): 98.8 (06-30-23 @ 11:29), Max: 99.3 (06-29-23 @ 16:42)  HR: 84 (06-30-23 @ 11:29) (79 - 103)  BP: 145/75 (06-30-23 @ 11:29) (110/69 - 160/74)  BP(mean): --  RR: 18 (06-30-23 @ 11:29) (17 - 18)  SpO2: 98% (06-30-23 @ 11:29) (96% - 98%)    Orthostatic VS  06-29-23 @ 16:27  Lying BP: 154/75 HR: 89  Sitting BP: 147/76 HR: 95  Standing BP: 110/69 HR: 99  Site: --  Mode: --        Physical examination:  Alert awake oriented. In no apparent distress, breathing comfortably  HEENT: no cervical lymphadenopathy  Chest:  Clear to auscultation bilaterally  CVS:  No murmur/rubs/gallops; regular rate and rhythm  Abdomen:  Nondistended, nontender, bowel sounds normal, no organomegaly  Extremities:  No pedal edema  Neurological: no focal neurolgical deficit         CBC:                                        10.6   4.81  )-----------( 110      ( 30 Jun 2023 06:27 )             31.0                           10.3   1.79  )-----------( 138      ( 29 Jun 2023 05:15 )             30.9     06-29    134<L>  |  101  |  12.1  ----------------------------<  243<H>  3.6   |  22.0  |  0.82    Ca    8.0<L>      29 Jun 2023 05:15  Phos  0.9     06-29  Mg     1.9     06-29    TPro  5.7<L>  /  Alb  3.0<L>  /  TBili  0.5  /  DBili  x   /  AST  40<H>  /  ALT  34  /  AlkPhos  88  06-29

## 2023-06-30 NOTE — DISCHARGE NOTE PROVIDER - NSDCMRMEDTOKEN_GEN_ALL_CORE_FT
atorvastatin 40 mg oral tablet: 1 orally once a day (at bedtime)  HumaLOG:  subcutaneous   hydroCHLOROthiazide 25 mg oral tablet: 1 orally once a day   mg oral tablet: 1 tab(s) orally every 6 hours   insulin glulisine 100 units/mL injectable solution: 10 international unit(s) injectable 3 times a day  levothyroxine 50 mcg (0.05 mg) oral tablet: 1 orally once a day  losartan 100 mg oral tablet: 1 orally once a day  pantoprazole 40 mg oral delayed release tablet: 1 orally once a day  Toujeo SoloStar 300 units/mL subcutaneous solution: 15 unit(s) subcutaneous once a day (at bedtime)   amoxicillin-clavulanate 875 mg-125 mg oral tablet: 1 tab(s) orally 2 times a day  atorvastatin 40 mg oral tablet: 1 orally once a day (at bedtime)  HumaLO unit(s) subcutaneous 3 times a day (with meals) as per sliding scale  levothyroxine 50 mcg (0.05 mg) oral tablet: 1 orally once a day  losartan 100 mg oral tablet: 1 orally once a day  pantoprazole 40 mg oral delayed release tablet: 1 orally once a day  simethicone 80 mg oral tablet, chewable: 1 tab(s) orally 3 times a day As needed Gas  Toujeo SoloStar 300 units/mL subcutaneous solution: 15 unit(s) subcutaneous once a day (at bedtime)

## 2023-06-30 NOTE — PROGRESS NOTE ADULT - SUBJECTIVE AND OBJECTIVE BOX
NEPHROLOGY INTERVAL HPI/OVERNIGHT EVENTS:    Examined earlier  No distress    MEDICATIONS  (STANDING):  atorvastatin 40 milliGRAM(s) Oral at bedtime  cefepime  Injectable. 1000 milliGRAM(s) IV Push every 12 hours  chlorhexidine 2% Cloths 1 Application(s) Topical <User Schedule>  dextrose 5%. 1000 milliLiter(s) (50 mL/Hr) IV Continuous <Continuous>  dextrose 5%. 1000 milliLiter(s) (100 mL/Hr) IV Continuous <Continuous>  dextrose 50% Injectable 25 Gram(s) IV Push once  dextrose 50% Injectable 12.5 Gram(s) IV Push once  dextrose 50% Injectable 25 Gram(s) IV Push once  enoxaparin Injectable 40 milliGRAM(s) SubCutaneous every 24 hours  glucagon  Injectable 1 milliGRAM(s) IntraMuscular once  insulin glargine Injectable (LANTUS) 15 Unit(s) SubCutaneous at bedtime  insulin lispro (ADMELOG) corrective regimen sliding scale   SubCutaneous three times a day before meals  insulin lispro (ADMELOG) corrective regimen sliding scale   SubCutaneous at bedtime  insulin lispro Injectable (ADMELOG) 10 Unit(s) SubCutaneous three times a day before meals  levothyroxine 50 MICROGram(s) Oral daily  losartan 100 milliGRAM(s) Oral daily  pantoprazole    Tablet 40 milliGRAM(s) Oral before breakfast    MEDICATIONS  (PRN):  acetaminophen     Tablet .. 650 milliGRAM(s) Oral every 6 hours PRN Temp greater or equal to 38C (100.4F), Mild Pain (1 - 3)  aluminum hydroxide/magnesium hydroxide/simethicone Suspension 30 milliLiter(s) Oral every 4 hours PRN Dyspepsia  dextrose Oral Gel 15 Gram(s) Oral once PRN Blood Glucose LESS THAN 70 milliGRAM(s)/deciliter  melatonin 3 milliGRAM(s) Oral at bedtime PRN Insomnia  ondansetron Injectable 4 milliGRAM(s) IV Push every 8 hours PRN Nausea and/or Vomiting  simethicone 80 milliGRAM(s) Chew three times a day PRN Gas      Allergies    Seafood (Hives; Rash)  Altace (Hives)    Intolerances        Vital Signs Last 24 Hrs  T(C): 37.1 (30 Jun 2023 11:29), Max: 37.4 (29 Jun 2023 16:42)  T(F): 98.8 (30 Jun 2023 11:29), Max: 99.3 (29 Jun 2023 16:42)  HR: 84 (30 Jun 2023 11:29) (79 - 103)  BP: 145/75 (30 Jun 2023 11:29) (110/69 - 160/74)  BP(mean): --  RR: 18 (30 Jun 2023 11:29) (17 - 18)  SpO2: 98% (30 Jun 2023 11:29) (96% - 98%)    Parameters below as of 30 Jun 2023 11:29  Patient On (Oxygen Delivery Method): room air      PHYSICAL EXAM:  GENERAL: NAD  EYES: EOMI  NECK: Supple  NERVOUS SYSTEM:  Alert & Oriented X3  CHEST/LUNG: Clear bilaterally  HEART: Regular rate and rhythm; No rub  ABDOMEN: Soft, Nontender, +BS  EXTREMITIES: No dependent edema    LABS:                        10.6   4.81  )-----------( 110      ( 30 Jun 2023 06:27 )             31.0     06-30    132<L>  |  99  |  9.0  ----------------------------<  169<H>  3.1<L>   |  23.0  |  0.61    Ca    8.0<L>      30 Jun 2023 06:27  Phos  1.3     06-30  Mg     1.9     06-30    TPro  5.5<L>  /  Alb  3.0<L>  /  TBili  0.4  /  DBili  x   /  AST  51<H>  /  ALT  46<H>  /  AlkPhos  94  06-30      Urinalysis Basic - ( 30 Jun 2023 06:27 )    Color: x / Appearance: x / SG: x / pH: x  Gluc: 169 mg/dL / Ketone: x  / Bili: x / Urobili: x   Blood: x / Protein: x / Nitrite: x   Leuk Esterase: x / RBC: x / WBC x   Sq Epi: x / Non Sq Epi: x / Bacteria: x      Magnesium: 1.9 mg/dL (06-30 @ 06:27)  Phosphorus: 1.3 mg/dL (06-30 @ 06:27)          RADIOLOGY & ADDITIONAL TESTS:

## 2023-06-30 NOTE — DISCHARGE NOTE PROVIDER - NSDCCPCAREPLAN_GEN_ALL_CORE_FT
PRINCIPAL DISCHARGE DIAGNOSIS  Diagnosis: Syncope  Assessment and Plan of Treatment: likely 2/2 hypovolemia      SECONDARY DISCHARGE DIAGNOSES  Diagnosis: Febrile illness  Assessment and Plan of Treatment:     Diagnosis: Dehydration with hyponatremia  Assessment and Plan of Treatment: improved. hctz was stopped. please follow up with provider for BP check and repeat labs

## 2023-06-30 NOTE — DISCHARGE NOTE NURSING/CASE MANAGEMENT/SOCIAL WORK - NSDCPEFALRISK_GEN_ALL_CORE
For information on Fall & Injury Prevention, visit: [email or web address] OR  [email or web address] OR  https://www.cdc.gov/steadi/patient.html

## 2023-06-30 NOTE — PROGRESS NOTE ADULT - ASSESSMENT
64 y/o male with PMH of DM-1, gastric adenocarcinoma on chemo, hypothyroidism, HTN, HLD came to the ED s/p syncope.  [place] [facility] [place] he was dehydrated; while there he felt lightheaded and passed out.  Patient has history of gastric cancer, status post FOLFOX and docetaxel with last dose on 6/16/23.  Labs from today significant for leukopenia WBC count 1.67, hemoglobin 10.5, platelet count 140, sodium 127. ANC yesterday was 1.82.  CT angiogram chest with no evidence of PE.    History of gastric cancer:  -patient on chemotherapy with last dose on 6/16/23 (received FOLFOX and docetaxel)  -WBC count today 1.7,  will give one dose Zarxio today   -all therapy on hold during inpatient stay   -anemia likely multifactorial in a patient on chemo and has history of cancer, p.r.n. PRBC transfusion to keep hemoglobin more than 7    Syncopal episode:  -neurology and cardiology input noted.  syncope sec to hypovolumia and DKA - now improved   -CT head upon presentation unremarkable  - TTE pending     Hyponatremia:  -follow-up nephrology recommendations  - off diuretics   - Na today 134    Will update [facility] daily    Nav Nava MD  [facility]  [phone]  64 y/o male with PMH of DM-1, gastric adenocarcinoma on chemo, hypothyroidism, HTN, HLD came to the ED s/p syncope.  Patient went to [facility] for IVF because he was dehydrated; while there he felt lightheaded and passed out.  Patient has history of gastric cancer, status post FOLFOX and docetaxel with last dose on 6/16/23.  Labs from today significant for leukopenia WBC count 1.67, hemoglobin 10.5, platelet count 140, sodium 127. ANC yesterday was 1.82.  CT angiogram chest with no evidence of PE.    History of gastric cancer:  -patient on chemotherapy with last dose on 6/16/23 (received FOLFOX and docetaxel)  -WBC count today 4.8 s/p one dose Zarxio   -all therapy on hold during inpatient stay   -anemia likely multifactorial in a patient on chemo and has history of cancer, p.r.n. PRBC transfusion to keep hemoglobin more than 7    Syncopal episode:  -neurology and cardiology input noted.  syncope sec to hypovolumia and DKA - now improved   -CT head upon presentation unremarkable  - TTE - normal EF     Hyponatremia:  -follow-up nephrology recommendations  - off diuretics   - Na today 134    d/c home likely today     Will update [facility] daily    Nav Nava MD  [facility]  [phone]

## 2023-06-30 NOTE — DISCHARGE NOTE PROVIDER - HOSPITAL COURSE
63y/oM PMH DM1, gastric adenocarcinoma on chemo, hypothyroidism, HTN, HLD came to ER s/p syncope. Pt went to [facility] for IVF because he was dehydrated, while there he felt lightheaded and passed out. He reported n/v, minimal PO intake. Of note, seen in ER 6/25 for similar presentation. CTH, CT c/a/p reviewed. Denies head trauma. In ER, leukopenic, febrile, admitted for further w/u. started on abx, blood and urine cx ngtd. will dc to complete abx course for 7 days. Seen by nephrology for hyponatremia, hctz held. mild Dka on presentation,  improved. pt to continue home insulin regimen for type 1 dm. cardiology eval appreciated, tte reviewed. electrolytes monitored and repleted. pt to dc home with outpt f/u

## 2023-06-30 NOTE — DISCHARGE NOTE PROVIDER - CARE PROVIDER_API CALL
Ke Ramirez  Cardiology  [address] [place] [address]  Phone: [phone]  Fax: [phone]  Follow Up Time:     PMD,   Phone: (   )    -  Fax: (   )    -  Follow Up Time:     Oncology,   Phone: (   )    -  Fax: (   )    -  Follow Up Time:     Jonathon Evans  Nephrology  [address] [place] [address]  Phone: [phone]  Fax: [phone]  Follow Up Time:

## 2023-06-30 NOTE — PROGRESS NOTE ADULT - ASSESSMENT
Hyponatremia: serum Na still low but better  Hyperglycemia and vol depletion, HCTZ---> improved  High AG metabolic acidosis --> resolved  - cont to hold HCTZ and optimize glucose control  - IVF stopped 6/29  - cont ARB   - follow labs    HypoKalemia supplemented    Hypophosphatemia:  - supplementation ongoing IV K- phos      AM labs will follow

## 2023-06-30 NOTE — PROGRESS NOTE ADULT - PROVIDER SPECIALTY LIST ADULT
Cardiology
Nephrology
Cardiology
Heme/Onc
Heme/Onc
Nephrology
Hospitalist
Nephrology
Internal Medicine

## 2023-07-01 VITALS
TEMPERATURE: 99 F | RESPIRATION RATE: 18 BRPM | DIASTOLIC BLOOD PRESSURE: 74 MMHG | HEART RATE: 93 BPM | SYSTOLIC BLOOD PRESSURE: 134 MMHG | OXYGEN SATURATION: 98 %

## 2023-07-01 LAB
CULTURE RESULTS: SIGNIFICANT CHANGE UP
CULTURE RESULTS: SIGNIFICANT CHANGE UP
SPECIMEN SOURCE: SIGNIFICANT CHANGE UP
SPECIMEN SOURCE: SIGNIFICANT CHANGE UP

## 2023-07-02 ENCOUNTER — EMERGENCY (EMERGENCY)
Facility: HOSPITAL | Age: 63
LOS: 1 days | Discharge: HOME | End: 2023-07-02
Attending: STUDENT IN AN ORGANIZED HEALTH CARE EDUCATION/TRAINING PROGRAM
Payer: COMMERCIAL

## 2023-07-02 VITALS
OXYGEN SATURATION: 98 % | WEIGHT: 175.05 LBS | RESPIRATION RATE: 18 BRPM | HEART RATE: 101 BPM | DIASTOLIC BLOOD PRESSURE: 65 MMHG | SYSTOLIC BLOOD PRESSURE: 117 MMHG | HEIGHT: 65 IN | TEMPERATURE: 98 F

## 2023-07-02 VITALS
OXYGEN SATURATION: 100 % | DIASTOLIC BLOOD PRESSURE: 79 MMHG | HEART RATE: 98 BPM | RESPIRATION RATE: 18 BRPM | TEMPERATURE: 99 F | SYSTOLIC BLOOD PRESSURE: 191 MMHG

## 2023-07-02 DIAGNOSIS — K31.9 DISEASE OF STOMACH AND DUODENUM, UNSPECIFIED: Chronic | ICD-10-CM

## 2023-07-02 LAB
ALBUMIN SERPL ELPH-MCNC: 3.2 G/DL — LOW (ref 3.3–5.2)
ALP SERPL-CCNC: 131 U/L — HIGH (ref 40–120)
ALT FLD-CCNC: 100 U/L — HIGH
ANION GAP SERPL CALC-SCNC: 16 MMOL/L — SIGNIFICANT CHANGE UP (ref 5–17)
APPEARANCE UR: CLEAR — SIGNIFICANT CHANGE UP
APTT BLD: 26.1 SEC — LOW (ref 27.5–35.5)
AST SERPL-CCNC: 109 U/L — HIGH
BASOPHILS # BLD AUTO: 0 K/UL — SIGNIFICANT CHANGE UP (ref 0–0.2)
BASOPHILS NFR BLD AUTO: 0 % — SIGNIFICANT CHANGE UP (ref 0–2)
BILIRUB SERPL-MCNC: 0.5 MG/DL — SIGNIFICANT CHANGE UP (ref 0.4–2)
BILIRUB UR-MCNC: NEGATIVE — SIGNIFICANT CHANGE UP
BUN SERPL-MCNC: 9.4 MG/DL — SIGNIFICANT CHANGE UP (ref 8–20)
CALCIUM SERPL-MCNC: 8.4 MG/DL — SIGNIFICANT CHANGE UP (ref 8.4–10.5)
CHLORIDE SERPL-SCNC: 93 MMOL/L — LOW (ref 96–108)
CO2 SERPL-SCNC: 20 MMOL/L — LOW (ref 22–29)
COLOR SPEC: YELLOW — SIGNIFICANT CHANGE UP
CREAT SERPL-MCNC: 0.89 MG/DL — SIGNIFICANT CHANGE UP (ref 0.5–1.3)
DIFF PNL FLD: NEGATIVE — SIGNIFICANT CHANGE UP
EGFR: 96 ML/MIN/1.73M2 — SIGNIFICANT CHANGE UP
EOSINOPHIL # BLD AUTO: 0 K/UL — SIGNIFICANT CHANGE UP (ref 0–0.5)
EOSINOPHIL NFR BLD AUTO: 0 % — SIGNIFICANT CHANGE UP (ref 0–6)
GLUCOSE SERPL-MCNC: 274 MG/DL — HIGH (ref 70–99)
GLUCOSE UR QL: 1000 MG/DL
HCT VFR BLD CALC: 34.5 % — LOW (ref 39–50)
HGB BLD-MCNC: 11.6 G/DL — LOW (ref 13–17)
INR BLD: 1.17 RATIO — HIGH (ref 0.88–1.16)
KETONES UR-MCNC: ABNORMAL
LACTATE BLDV-MCNC: 2 MMOL/L — SIGNIFICANT CHANGE UP (ref 0.5–2)
LEUKOCYTE ESTERASE UR-ACNC: NEGATIVE — SIGNIFICANT CHANGE UP
LYMPHOCYTES # BLD AUTO: 1.46 K/UL — SIGNIFICANT CHANGE UP (ref 1–3.3)
LYMPHOCYTES # BLD AUTO: 28.1 % — SIGNIFICANT CHANGE UP (ref 13–44)
MCHC RBC-ENTMCNC: 26.7 PG — LOW (ref 27–34)
MCHC RBC-ENTMCNC: 33.6 GM/DL — SIGNIFICANT CHANGE UP (ref 32–36)
MCV RBC AUTO: 79.3 FL — LOW (ref 80–100)
MONOCYTES # BLD AUTO: 0.82 K/UL — SIGNIFICANT CHANGE UP (ref 0–0.9)
MONOCYTES NFR BLD AUTO: 15.8 % — HIGH (ref 2–14)
NEUTROPHILS # BLD AUTO: 2.33 K/UL — SIGNIFICANT CHANGE UP (ref 1.8–7.4)
NEUTROPHILS NFR BLD AUTO: 44.7 % — SIGNIFICANT CHANGE UP (ref 43–77)
NITRITE UR-MCNC: NEGATIVE — SIGNIFICANT CHANGE UP
PH UR: 7 — SIGNIFICANT CHANGE UP (ref 5–8)
PLATELET # BLD AUTO: 136 K/UL — LOW (ref 150–400)
POTASSIUM SERPL-MCNC: 3.6 MMOL/L — SIGNIFICANT CHANGE UP (ref 3.5–5.3)
POTASSIUM SERPL-SCNC: 3.6 MMOL/L — SIGNIFICANT CHANGE UP (ref 3.5–5.3)
PROT SERPL-MCNC: 5.7 G/DL — LOW (ref 6.6–8.7)
PROT UR-MCNC: NEGATIVE — SIGNIFICANT CHANGE UP
PROTHROM AB SERPL-ACNC: 13.6 SEC — HIGH (ref 10.5–13.4)
RBC # BLD: 4.35 M/UL — SIGNIFICANT CHANGE UP (ref 4.2–5.8)
RBC # FLD: 16.2 % — HIGH (ref 10.3–14.5)
SODIUM SERPL-SCNC: 129 MMOL/L — LOW (ref 135–145)
SP GR SPEC: 1 — LOW (ref 1.01–1.02)
UROBILINOGEN FLD QL: NEGATIVE MG/DL — SIGNIFICANT CHANGE UP
WBC # BLD: 5.21 K/UL — SIGNIFICANT CHANGE UP (ref 3.8–10.5)
WBC # FLD AUTO: 5.21 K/UL — SIGNIFICANT CHANGE UP (ref 3.8–10.5)

## 2023-07-02 PROCEDURE — 80053 COMPREHEN METABOLIC PANEL: CPT

## 2023-07-02 PROCEDURE — 96374 THER/PROPH/DIAG INJ IV PUSH: CPT

## 2023-07-02 PROCEDURE — 87040 BLOOD CULTURE FOR BACTERIA: CPT

## 2023-07-02 PROCEDURE — 71045 X-RAY EXAM CHEST 1 VIEW: CPT | Mod: 26

## 2023-07-02 PROCEDURE — 87086 URINE CULTURE/COLONY COUNT: CPT

## 2023-07-02 PROCEDURE — 99285 EMERGENCY DEPT VISIT HI MDM: CPT

## 2023-07-02 PROCEDURE — 96375 TX/PRO/DX INJ NEW DRUG ADDON: CPT

## 2023-07-02 PROCEDURE — 85025 COMPLETE CBC W/AUTO DIFF WBC: CPT

## 2023-07-02 PROCEDURE — 99285 EMERGENCY DEPT VISIT HI MDM: CPT | Mod: 25

## 2023-07-02 PROCEDURE — 81003 URINALYSIS AUTO W/O SCOPE: CPT

## 2023-07-02 PROCEDURE — 83605 ASSAY OF LACTIC ACID: CPT

## 2023-07-02 PROCEDURE — 93010 ELECTROCARDIOGRAM REPORT: CPT

## 2023-07-02 PROCEDURE — 93005 ELECTROCARDIOGRAM TRACING: CPT

## 2023-07-02 PROCEDURE — 85610 PROTHROMBIN TIME: CPT

## 2023-07-02 PROCEDURE — 71045 X-RAY EXAM CHEST 1 VIEW: CPT

## 2023-07-02 PROCEDURE — 85730 THROMBOPLASTIN TIME PARTIAL: CPT

## 2023-07-02 PROCEDURE — 36415 COLL VENOUS BLD VENIPUNCTURE: CPT

## 2023-07-02 RX ORDER — VANCOMYCIN HCL 1 G
1000 VIAL (EA) INTRAVENOUS ONCE
Refills: 0 | Status: COMPLETED | OUTPATIENT
Start: 2023-07-02 | End: 2023-07-02

## 2023-07-02 RX ORDER — CEFEPIME 1 G/1
2000 INJECTION, POWDER, FOR SOLUTION INTRAMUSCULAR; INTRAVENOUS ONCE
Refills: 0 | Status: DISCONTINUED | OUTPATIENT
Start: 2023-07-02 | End: 2023-07-02

## 2023-07-02 RX ORDER — SODIUM CHLORIDE 9 MG/ML
2500 INJECTION INTRAMUSCULAR; INTRAVENOUS; SUBCUTANEOUS ONCE
Refills: 0 | Status: COMPLETED | OUTPATIENT
Start: 2023-07-02 | End: 2023-07-02

## 2023-07-02 RX ORDER — CEFEPIME 1 G/1
2000 INJECTION, POWDER, FOR SOLUTION INTRAMUSCULAR; INTRAVENOUS ONCE
Refills: 0 | Status: COMPLETED | OUTPATIENT
Start: 2023-07-02 | End: 2023-07-02

## 2023-07-02 RX ADMIN — SODIUM CHLORIDE 2500 MILLILITER(S): 9 INJECTION INTRAMUSCULAR; INTRAVENOUS; SUBCUTANEOUS at 22:10

## 2023-07-02 RX ADMIN — Medication 250 MILLIGRAM(S): at 22:10

## 2023-07-02 RX ADMIN — CEFEPIME 2000 MILLIGRAM(S): 1 INJECTION, POWDER, FOR SOLUTION INTRAMUSCULAR; INTRAVENOUS at 22:10

## 2023-07-02 NOTE — ED ADULT TRIAGE NOTE - AS TEMP SITE
oral O-T Advancement Flap Text: The defect edges were debeveled with a #15 scalpel blade.  Given the location of the defect, shape of the defect and the proximity to free margins an O-T advancement flap was deemed most appropriate.  Using a sterile surgical marker, an appropriate advancement flap was drawn incorporating the defect and placing the expected incisions within the relaxed skin tension lines where possible.    The area thus outlined was incised deep to adipose tissue with a #15 scalpel blade.  The skin margins were undermined to an appropriate distance in all directions utilizing iris scissors.

## 2023-07-02 NOTE — ED PROVIDER NOTE - PATIENT PORTAL LINK FT
You can access the FollowMyHealth Patient Portal offered by [facility] by registering at the following website: [email or web address]. By joining [employer]’s FollowMyHealth portal, you will also be able to view your health information using other applications (apps) compatible with our system.

## 2023-07-02 NOTE — ED PROVIDER NOTE - NS ED ROS FT
+ fever no chills   No photophobia/eye pain/changes in visio,   No ear pain/sore throat/dysphagia   No chest pain/palpitations  No SOB/cough/wheeze/stridor   No abdominal pain, No N/V/D  No dysuria/frequency/discharge   No neck/back pain,   No rash  No changes in neurological status/function.

## 2023-07-02 NOTE — ED PROVIDER NOTE - OBJECTIVE STATEMENT
Pt is a 62 yo M co fever.  PMHx significant for htn, dm, hypothyroidism, gastric CA on chemo.  Pt had several syncopal episodes last week and was admitted to the hospital.  Pt states that he was discharged friday on amoxicillin and since being home he has had fever to 101 on 3 consecutive days.  pt with fever again today and was told to come to the ER. no n/v. no abd pain. no other complaints.

## 2023-07-02 NOTE — ED PROVIDER NOTE - SERVICE REFUSED
Dominic Loop    12/31/2019      Subjective:     Patient is a 64 y.o. female who was sent by their primary care physician for screening colonoscopy. Pt denies any symptoms of abdominal pain, change in bowel habits, blood per rectum, unexplained weight loss, or other symptoms that would be of concern for colon cancer.     Patient Active Problem List    Diagnosis Date Noted    Family history of colon cancer 12/05/2019    Cervical intraepithelial neoplasia grade III with severe dysplasia 05/06/2019    Menopausal symptom 04/24/2018    Hypertension     Bulging of cervical intervertebral disc     Gastroesophageal reflux disease without esophagitis 12/29/2017    Vitamin D deficiency 12/29/2017    Chronic right shoulder pain 10/31/2017    DDD (degenerative disc disease), cervical 10/31/2017    Epigastric abdominal pain 06/10/2016    Other allergic rhinitis 09/14/2015    Joint pain 09/24/2013    HTN (hypertension) 06/24/2009     Past Medical History:   Diagnosis Date    Arthritis     Bulging of cervical intervertebral disc     Chronic pain     right shoulder     DJD (degenerative joint disease) of knee     Gastroesophageal reflux disease without esophagitis 12/29/2017    GERD (gastroesophageal reflux disease)     History of cervical cancer ? pt unsure    s/p hyst    Hypertension     controlled with med    Joint pain 9/24/2013    Menopause     Sickle cell trait ([facility] [address]       Past Surgical History:   Procedure Laterality Date    COLONOSCOPY N/A 7/7/2016    COLONOS[facility]ropeza [facility] ENDOSCOPY    HX BREAST BIOPSY Left     HX CHOLECYSTECTOMY      HX COLONOSCOPY      5 yrs ago, GI doctor    HX GI      benign tumor from ileum as a teen    HX HYSTERECTOMY      hysterectomy    HX LAP CHOLECYSTECTOMY      HX OTHER SURGICAL      ganglion cyst right wrist    HX OTHER SURGICAL  08/2015    cervical neck injection    HX SHOULDER ARTHROSCOPY Left 04/04/2017    HX SHOULDER ARTHROSCOPY Right 2017      Prior to Admission Medications   Prescriptions Last Dose Informant Patient Reported? Taking? PAZEO 0.7 % drop   Yes No   acetaminophen (TYLENOL ARTHRITIS PAIN) 650 mg TbER   Yes No   Sig: Take 650 mg by mouth as needed. azilsartan med-chlorthalidone (EDARBYCLOR) 40-12.5 mg tab   No No   Sig: TAKE 1TABLET EVERY DAY   carvedilol (COREG) 12.5 mg tablet   No No   Sig: Take 1 Tab by mouth two (2) times daily (with meals). celecoxib (CELEBREX) 200 mg capsule   No No   Sig: Take 1 Cap by mouth two (2) times a day. ergocalciferol (ERGOCALCIFEROL) 50,000 unit capsule   No No   Sig: TAKE ONE CAPSULE BY MOUTH MONTHLY   fluticasone propionate (FLONASE) 50 mcg/actuation nasal spray   No No   [place] by Both Nostrils route two (2) times a day. montelukast (SINGULAIR) 10 mg tablet   No No   Sig: TAKE 1 TABLET BY MOUTH EVERY DAY   pantoprazole (PROTONIX) 40 mg tablet   No No   Sig: TAKE 1 TABLET BY MOUTH EVERY DAY   potassium chloride (K-DUR, KLOR-CON) 20 mEq tablet   No No   Sig: Take 1 Tab by mouth daily. Facility-Administered Medications: None     No Known Allergies   Social History     Tobacco Use    Smoking status: Former Smoker     Packs/day: 0.50     Years: 12.00     Pack years: 6.00     Last attempt to quit: 2007     Years since quittin.0    Smokeless tobacco: Never Used   Substance Use Topics    Alcohol use: No      Social History     Patient does not qualify to have social determinant information on file (likely too young).    Social History Narrative    Not on file     Family History   Problem Relation Age of Onset    Sickle Cell Anemia Mother     No Known Problems Father     Cancer Maternal Grandmother         colon cancer    Diabetes Maternal Grandmother     Heart Attack Other         unknown grandparent    Breast Cancer Neg Hx     Ovarian Cancer Neg Hx     Uterine Cancer Neg Hx         No current facility-administered medications for this encounter. Review of Systems  A comprehensive review of systems was negative except for that written in the HPI. Objective: There were no vitals filed for this visit. PHYSICAL EXAM     Gen- the patient is well developed and in no acute distress  HEENT- PERRL, EOMI, no scleral icterus       nose without alar flaring or epistaxis                  oral muscosa moist without cyanosis  Neck- no JVD or retractions  Lungs-clear  Heart- RRR without M,G,R  Abd- soft and non-tender; with positive bowel sounds. Ext- warm without cyanosis. There is no lower leg edema. Skin- no jaundice or rashes, no wounds   Neuro- alert and oriented x 3. No gross sensorimotor deficits are present. Plan:     Age appropriate screening colonoscopy. I have discussed the risks, benefits and alternatives of surgery. Pt understands and wishes to proceed.   Consent obtained           Abida Ann MD admission

## 2023-07-02 NOTE — ED ADULT TRIAGE NOTE - CHIEF COMPLAINT QUOTE
pt states has stomach cancer, discharged Friday from hospital, was there for Syncope   running fever since he left the hospital  A&Ox3, resp wnl,

## 2023-07-02 NOTE — ED ADULT NURSE NOTE - NSFALLUNIVINTERV_ED_ALL_ED
Bed/Stretcher in lowest position, wheels locked, appropriate side rails in place/Call bell, personal items and telephone in reach/Instruct patient to call for assistance before getting out of bed/chair/stretcher/Non-slip footwear applied when patient is off stretcher/[place] to call system/Physically safe environment - no spills, clutter or unnecessary equipment/Purposeful proactive rounding/Room/bathroom lighting operational, light cord in reach

## 2023-07-02 NOTE — ED ADULT NURSE NOTE - OBJECTIVE STATEMENT
Assumed care of patient in iso4. Pt a&ox4 rr even and unlabored with pmhx of tn, dm, hypothyroidism, gastric ca on chemo, last chemo 6/26 as per pt. Pt reports discharged friday on amoxicillin and since then he has been having fevers at home to 101 for 3 days. Pt reports having fever again today and told to come to the ER. Pt denies chest pain, sob, gu/gi symptoms. pt educated on plan of care, pt able to successfully teach back plan of care to RN, RN will continue to reeducate pt during hospital stay.

## 2023-07-02 NOTE — ED PROVIDER NOTE - CLINICAL SUMMARY MEDICAL DECISION MAKING FREE TEXT BOX
labs and imaging reviewed.  no leukocytosis, mild bump in liver enzymes but no abd pain. cxr neg.  plan was to admit but patient does not want to stay.   The patient has decided to leave against medical advice.  The patient is AAOx3, not intoxicated, and displays normal decision making ability. We discussed all risks, benefits, and alternatives to the progression of treatment and the potential dangers of leaving including but not limited to permanent disability, injury, and death.  The patient was instructed that they are welcome to change their decision to leave against medical advice and return to the emergency department at any time and for any reason in order to allow us to render care.

## 2023-07-03 LAB
CULTURE RESULTS: NO GROWTH — SIGNIFICANT CHANGE UP
CULTURE RESULTS: SIGNIFICANT CHANGE UP
CULTURE RESULTS: SIGNIFICANT CHANGE UP
SPECIMEN SOURCE: SIGNIFICANT CHANGE UP

## 2023-07-05 ENCOUNTER — INPATIENT (INPATIENT)
Facility: HOSPITAL | Age: 63
LOS: 1 days | Discharge: HOME | DRG: 644 | End: 2023-07-07
Attending: INTERNAL MEDICINE | Admitting: INTERNAL MEDICINE
Payer: COMMERCIAL

## 2023-07-05 VITALS
SYSTOLIC BLOOD PRESSURE: 146 MMHG | RESPIRATION RATE: 17 BRPM | DIASTOLIC BLOOD PRESSURE: 63 MMHG | WEIGHT: 175.05 LBS | HEART RATE: 78 BPM | TEMPERATURE: 98 F | OXYGEN SATURATION: 100 % | HEIGHT: 65 IN

## 2023-07-05 DIAGNOSIS — R55 SYNCOPE AND COLLAPSE: ICD-10-CM

## 2023-07-05 DIAGNOSIS — K31.9 DISEASE OF STOMACH AND DUODENUM, UNSPECIFIED: Chronic | ICD-10-CM

## 2023-07-05 LAB
ACANTHOCYTES BLD QL SMEAR: SLIGHT — SIGNIFICANT CHANGE UP
ALBUMIN SERPL ELPH-MCNC: 2.3 G/DL — LOW (ref 3.3–5)
ALP SERPL-CCNC: 163 U/L — HIGH (ref 40–120)
ALT FLD-CCNC: 114 U/L — HIGH (ref 12–78)
ANION GAP SERPL CALC-SCNC: 4 MMOL/L — LOW (ref 5–17)
AST SERPL-CCNC: 102 U/L — HIGH (ref 15–37)
BASOPHILS # BLD AUTO: 0 K/UL — SIGNIFICANT CHANGE UP (ref 0–0.2)
BASOPHILS NFR BLD AUTO: 0 % — SIGNIFICANT CHANGE UP (ref 0–2)
BILIRUB SERPL-MCNC: 0.4 MG/DL — SIGNIFICANT CHANGE UP (ref 0.2–1.2)
BUN SERPL-MCNC: 8 MG/DL — SIGNIFICANT CHANGE UP (ref 7–23)
CALCIUM SERPL-MCNC: 8.1 MG/DL — LOW (ref 8.5–10.1)
CHLORIDE SERPL-SCNC: 108 MMOL/L — SIGNIFICANT CHANGE UP (ref 96–108)
CO2 SERPL-SCNC: 23 MMOL/L — SIGNIFICANT CHANGE UP (ref 22–31)
CREAT SERPL-MCNC: 0.89 MG/DL — SIGNIFICANT CHANGE UP (ref 0.5–1.3)
EGFR: 96 ML/MIN/1.73M2 — SIGNIFICANT CHANGE UP
EOSINOPHIL # BLD AUTO: 0 K/UL — SIGNIFICANT CHANGE UP (ref 0–0.5)
EOSINOPHIL NFR BLD AUTO: 0 % — SIGNIFICANT CHANGE UP (ref 0–6)
GLUCOSE BLDC GLUCOMTR-MCNC: 110 MG/DL — HIGH (ref 70–99)
GLUCOSE BLDC GLUCOMTR-MCNC: 194 MG/DL — HIGH (ref 70–99)
GLUCOSE SERPL-MCNC: 157 MG/DL — HIGH (ref 70–99)
HCT VFR BLD CALC: 34 % — LOW (ref 39–50)
HGB BLD-MCNC: 11.3 G/DL — LOW (ref 13–17)
LYMPHOCYTES # BLD AUTO: 0.96 K/UL — LOW (ref 1–3.3)
LYMPHOCYTES # BLD AUTO: 13 % — SIGNIFICANT CHANGE UP (ref 13–44)
MAGNESIUM SERPL-MCNC: 2 MG/DL — SIGNIFICANT CHANGE UP (ref 1.6–2.6)
MANUAL SMEAR VERIFICATION: SIGNIFICANT CHANGE UP
MCHC RBC-ENTMCNC: 26.7 PG — LOW (ref 27–34)
MCHC RBC-ENTMCNC: 33.2 GM/DL — SIGNIFICANT CHANGE UP (ref 32–36)
MCV RBC AUTO: 80.2 FL — SIGNIFICANT CHANGE UP (ref 80–100)
MONOCYTES # BLD AUTO: 1.41 K/UL — HIGH (ref 0–0.9)
MONOCYTES NFR BLD AUTO: 19 % — HIGH (ref 2–14)
NEUTROPHILS # BLD AUTO: 5.04 K/UL — SIGNIFICANT CHANGE UP (ref 1.8–7.4)
NEUTROPHILS NFR BLD AUTO: 68 % — SIGNIFICANT CHANGE UP (ref 43–77)
NRBC # BLD: 0 /100 — SIGNIFICANT CHANGE UP (ref 0–0)
NRBC # BLD: SIGNIFICANT CHANGE UP /100 WBCS (ref 0–0)
OVALOCYTES BLD QL SMEAR: SLIGHT — SIGNIFICANT CHANGE UP
PHOSPHATE SERPL-MCNC: 2.8 MG/DL — SIGNIFICANT CHANGE UP (ref 2.5–4.5)
PLAT MORPH BLD: NORMAL — SIGNIFICANT CHANGE UP
PLATELET # BLD AUTO: 190 K/UL — SIGNIFICANT CHANGE UP (ref 150–400)
POIKILOCYTOSIS BLD QL AUTO: SLIGHT — SIGNIFICANT CHANGE UP
POLYCHROMASIA BLD QL SMEAR: SLIGHT — SIGNIFICANT CHANGE UP
POTASSIUM SERPL-MCNC: 3.7 MMOL/L — SIGNIFICANT CHANGE UP (ref 3.5–5.3)
POTASSIUM SERPL-SCNC: 3.7 MMOL/L — SIGNIFICANT CHANGE UP (ref 3.5–5.3)
PROT SERPL-MCNC: 6.1 GM/DL — SIGNIFICANT CHANGE UP (ref 6–8.3)
RBC # BLD: 4.24 M/UL — SIGNIFICANT CHANGE UP (ref 4.2–5.8)
RBC # FLD: 16.9 % — HIGH (ref 10.3–14.5)
RBC BLD AUTO: ABNORMAL
SODIUM SERPL-SCNC: 135 MMOL/L — SIGNIFICANT CHANGE UP (ref 135–145)
TROPONIN I, HIGH SENSITIVITY RESULT: 5.32 NG/L — SIGNIFICANT CHANGE UP
WBC # BLD: 7.41 K/UL — SIGNIFICANT CHANGE UP (ref 3.8–10.5)
WBC # FLD AUTO: 7.41 K/UL — SIGNIFICANT CHANGE UP (ref 3.8–10.5)

## 2023-07-05 PROCEDURE — 99223 1ST HOSP IP/OBS HIGH 75: CPT

## 2023-07-05 PROCEDURE — 95700 EEG CONT REC W/VID EEG TECH: CPT

## 2023-07-05 PROCEDURE — 84145 PROCALCITONIN (PCT): CPT

## 2023-07-05 PROCEDURE — 84100 ASSAY OF PHOSPHORUS: CPT

## 2023-07-05 PROCEDURE — 93010 ELECTROCARDIOGRAM REPORT: CPT

## 2023-07-05 PROCEDURE — 82962 GLUCOSE BLOOD TEST: CPT

## 2023-07-05 PROCEDURE — 99291 CRITICAL CARE FIRST HOUR: CPT

## 2023-07-05 PROCEDURE — 83735 ASSAY OF MAGNESIUM: CPT

## 2023-07-05 PROCEDURE — 71045 X-RAY EXAM CHEST 1 VIEW: CPT | Mod: 26

## 2023-07-05 PROCEDURE — 95816 EEG AWAKE AND DROWSY: CPT

## 2023-07-05 PROCEDURE — 70450 CT HEAD/BRAIN W/O DYE: CPT | Mod: 26,MA

## 2023-07-05 PROCEDURE — 80048 BASIC METABOLIC PNL TOTAL CA: CPT

## 2023-07-05 PROCEDURE — 85027 COMPLETE CBC AUTOMATED: CPT

## 2023-07-05 PROCEDURE — 81001 URINALYSIS AUTO W/SCOPE: CPT

## 2023-07-05 PROCEDURE — 36415 COLL VENOUS BLD VENIPUNCTURE: CPT

## 2023-07-05 PROCEDURE — 83605 ASSAY OF LACTIC ACID: CPT

## 2023-07-05 PROCEDURE — 87086 URINE CULTURE/COLONY COUNT: CPT

## 2023-07-05 PROCEDURE — 95714 VEEG EA 12-26 HR UNMNTR: CPT

## 2023-07-05 PROCEDURE — 87040 BLOOD CULTURE FOR BACTERIA: CPT

## 2023-07-05 RX ORDER — INSULIN GLULISINE 100 [IU]/ML
10 INJECTION, SOLUTION SUBCUTANEOUS
Refills: 0 | DISCHARGE

## 2023-07-05 RX ORDER — SODIUM CHLORIDE 9 MG/ML
1000 INJECTION, SOLUTION INTRAVENOUS ONCE
Refills: 0 | Status: COMPLETED | OUTPATIENT
Start: 2023-07-05 | End: 2023-07-05

## 2023-07-05 RX ORDER — LEVOTHYROXINE SODIUM 125 MCG
1 TABLET ORAL
Refills: 0 | DISCHARGE

## 2023-07-05 RX ORDER — ACETAMINOPHEN 500 MG
650 TABLET ORAL EVERY 6 HOURS
Refills: 0 | Status: DISCONTINUED | OUTPATIENT
Start: 2023-07-05 | End: 2023-07-06

## 2023-07-05 RX ORDER — ATORVASTATIN CALCIUM 80 MG/1
1 TABLET, FILM COATED ORAL
Refills: 0 | DISCHARGE

## 2023-07-05 RX ORDER — PANTOPRAZOLE SODIUM 20 MG/1
1 TABLET, DELAYED RELEASE ORAL
Refills: 0 | DISCHARGE

## 2023-07-05 RX ORDER — LOSARTAN POTASSIUM 100 MG/1
1 TABLET, FILM COATED ORAL
Refills: 0 | DISCHARGE

## 2023-07-05 RX ORDER — LANOLIN ALCOHOL/MO/W.PET/CERES
3 CREAM (GRAM) TOPICAL AT BEDTIME
Refills: 0 | Status: DISCONTINUED | OUTPATIENT
Start: 2023-07-05 | End: 2023-07-07

## 2023-07-05 RX ORDER — PROCHLORPERAZINE MALEATE 5 MG
1 TABLET ORAL
Refills: 0 | DISCHARGE

## 2023-07-05 RX ORDER — CHOLECALCIFEROL (VITAMIN D3) 125 MCG
1 CAPSULE ORAL
Refills: 0 | DISCHARGE

## 2023-07-05 RX ORDER — POTASSIUM CHLORIDE 20 MEQ
1 PACKET (EA) ORAL
Refills: 0 | DISCHARGE

## 2023-07-05 RX ORDER — INSULIN GLARGINE 100 [IU]/ML
15 INJECTION, SOLUTION SUBCUTANEOUS
Refills: 0 | DISCHARGE

## 2023-07-05 RX ORDER — ONDANSETRON 8 MG/1
1 TABLET, FILM COATED ORAL
Refills: 0 | DISCHARGE

## 2023-07-05 RX ORDER — LANSOPRAZOLE 15 MG/1
1 CAPSULE, DELAYED RELEASE ORAL
Refills: 0 | DISCHARGE

## 2023-07-05 RX ORDER — ONDANSETRON 8 MG/1
4 TABLET, FILM COATED ORAL EVERY 8 HOURS
Refills: 0 | Status: DISCONTINUED | OUTPATIENT
Start: 2023-07-05 | End: 2023-07-07

## 2023-07-05 RX ORDER — INSULIN GLARGINE 100 [IU]/ML
13 INJECTION, SOLUTION SUBCUTANEOUS
Refills: 0 | DISCHARGE

## 2023-07-05 RX ORDER — EZETIMIBE 10 MG/1
1 TABLET ORAL
Refills: 0 | DISCHARGE

## 2023-07-05 RX ADMIN — SODIUM CHLORIDE 1000 MILLILITER(S): 9 INJECTION, SOLUTION INTRAVENOUS at 11:55

## 2023-07-05 NOTE — ED PROVIDER NOTE - CLINICAL SUMMARY MEDICAL DECISION MAKING FREE TEXT BOX
64 yo male with PMHx of stomach cancer, HLD, HTN, hypothyroidism, IDDM presents to ED s/p syncopal episode at [facility] while sitting. no trauma to head. vitals are stable here. reportoed similar sx going on for 1 week. will r/o any electrolyte path vs. hyperglycemia vs. acs vs. arrythmia vs. intercranial hemorrhage vs any  mets considering stomach cancer and on chemo at presents. labs, iv fluids, xr, ekg reeval.

## 2023-07-05 NOTE — ED PROVIDER NOTE - NS ED ROS FT
General: +diaphoretic, No fevers, no chills, no nausea or vomiting  HEENT: No loc, no ha, no dizziness  Respiratory: no trouble breathing  Cardiovascular: No chest pain  GI: No abdominal pain, no constipation, no diarrhea  : No urinary complaints  Endocrine: no generalized weakness or malaise.  Neurological: +lightheadedness, No weakness, numbness or tingling  Psych: no SI, HI, AVH.  MSK: no recent trauma, no muscle pain General: +diaphoretic, No fevers, no chills, no nausea or vomiting  HEENT: No loc, no ha, no dizziness  Respiratory: no trouble breathing  Cardiovascular: No chest pain  GI: No abdominal pain, no constipation, no diarrhea  : No urinary complaints  Endocrine: no generalized weakness  Neurological: +lightheadedness, No weakness, numbness or tingling  MSK: no recent trauma, no muscle pain

## 2023-07-05 NOTE — ED ADULT NURSE NOTE - CAS TRG GEN SKIN CONDITION
they seem to have the same symptoms. What may be good for you may be harmful to others. · If you are no longer taking a prescribed medication and you have pills left please take your pills out of their original containers. Mix crushed pills with an undesirable substance, such as cat litter or used coffee grounds. Put the mixture into a disposable container with a lid, such as an empty margarine tub, or into a sealable bag. Cover up or remove any of your personal information on the empty containers by covering it with black permanent marker or duct tape. Place the sealed container with the mixture, and the empty drug containers, in the trash. · If you use a medication that is in the form of a patch, dispose of used patches by folding them in half so that the sticky sides meet, and then flushing them down a toilet. They should not be placed in the household trash where children or pets can find them. · If you have any questions, ask your provider or pharmacist for more information. · Be sure to keep all appointments for provider visits or tests.   ·
Warm/Dry

## 2023-07-05 NOTE — PHARMACOTHERAPY INTERVENTION NOTE - COMMENTS
Medication reconciliation completed.  Reviewed Medication list and confirmed med allergies with patient; confirmed with Dr. First Medmirna.

## 2023-07-05 NOTE — ED ADULT NURSE REASSESSMENT NOTE - NS ED NURSE REASSESS COMMENT FT1
Pt report received from Nohelia TYSON at 1900. Pt contact made shortly after. Pt has no new complaints at this time. Pt is Aox4 and speaking in full sentences. Pt is TBA with bed pending at this time.  Pt given water and extra blankets. Pt safety and comfort maintained.

## 2023-07-05 NOTE — ED PROVIDER NOTE - NS_ ATTENDINGSCRIBEDETAILS _ED_A_ED_FT
I, Vivien Clarke DO,  performed the initial face to face bedside interview with this patient regarding history of present illness, review of symptoms and relevant past medical, social and family history.  I completed an independent physical examination.  I was the initial provider who evaluated this patient.   I personally saw the patient and performed a substantive portion of the visit including all aspects of the medical decision making.  The history, relevant review of systems, past medical and surgical history, medical decision making, and physical examination was documented by the scribe in my presence and I attest to the accuracy of the documentation.

## 2023-07-05 NOTE — CONSULT NOTE ADULT - SUBJECTIVE AND OBJECTIVE BOX
Patient is a 63y old  Male who presents with a chief complaint of     HPI: Mr. Sánchez is a 63 year old man with gastric CA on chemotherapy, DM, hypothyroidism, presenting with staring spells.      PAST MEDICAL & SURGICAL HISTORY:  IDDM (insulin dependent diabetes mellitus)      HTN (hypertension)      DM (diabetes mellitus)      Hypothyroidism      HTN (hypertension)      HLD (hyperlipidemia)      Lesion of stomach      No significant past surgical history          FAMILY HISTORY:  Family history of diabetes mellitus (DM) (Mother)        Social Hx:  Nonsmoker, no drug or alcohol use    MEDICATIONS  (STANDING):       Allergies    Altace (Hives)  Seafood (Hives; Rash)    Intolerances        ROS: Pertinent positives in HPI, all other ROS were reviewed and are negative.      Vital Signs Last 24 Hrs  T(C): 36.8 (05 Jul 2023 10:42), Max: 36.8 (05 Jul 2023 10:42)  T(F): 98.3 (05 Jul 2023 10:42), Max: 98.3 (05 Jul 2023 10:42)  HR: 78 (05 Jul 2023 10:42) (78 - 78)  BP: 146/63 (05 Jul 2023 10:42) (146/63 - 146/63)  BP(mean): --  RR: 17 (05 Jul 2023 10:42) (17 - 17)  SpO2: 100% (05 Jul 2023 10:42) (100% - 100%)    Parameters below as of 05 Jul 2023 10:42  Patient On (Oxygen Delivery Method): room air            Constitutional: awake and alert.  HEENT: PERRLA, EOMI,   Neck: Supple.  Respiratory: Breath sounds are clear bilaterally  Cardiovascular: S1 and S2, regular / irregular rhythm  Gastrointestinal: soft, nontender  Extremities:  no edema  Vascular: Caritid Bruit - no  Musculoskeletal: no joint swelling/tenderness, no abnormal movements  Skin: No rashes    Neurological exam:  HF: A x O x 3. Appropriately interactive, normal affect. Speech fluent, No Aphasia or paraphasic errors. Naming /repetition intact   CN: BLANKA, EOMI, VFF, facial sensation normal, no NLFD, tongue midline, Palate moves equally, SCM equal bilaterally  Motor: No pronator drift, Strength 5/5 in all 4 ext, normal bulk and tone, no tremor, rigidity or bradykinesia.    Sens: Intact to light touch / PP/ VS/ JS    Reflexes: Symmetric and normal . BJ 2+, BR 2+, KJ 2+, AJ 2+, downgoing toes b/l  Coord:  No FNFA, dysmetria, LISSETH intact   Gait/Balance: Normal/Cannot test    NIHSS:          Labs:   07-05    135  |  108  |  8   ----------------------------<  157<H>  3.7   |  23  |  0.89    Ca    8.1<L>      05 Jul 2023 11:05  Phos  2.8     07-05  Mg     2.0     07-05    TPro  6.1  /  Alb  2.3<L>  /  TBili  0.4  /  DBili  x   /  AST  102<H>  /  ALT  114<H>  /  AlkPhos  163<H>  07-05                              11.3   7.41  )-----------( 190      ( 05 Jul 2023 11:05 )             34.0       Radiology:  CT head 7/5/23:   No acute intracranial hemorrhage, mass effect, or shift of   the midline structures.    Echo 6/28/23:   1. Left ventricular ejection fraction, by visual estimation, is 65%.   2. Normal global left ventricular systolic function.   3. Spectral Doppler shows impaired relaxation pattern of left   ventricular myocardial filling (Grade I diastolic dysfunction).   4. Normal left atrial size.   5. Normal right atrial size.   6. There is no evidence of pericardial effusion.   7. Trace mitral valve regurgitation.        Total Critical Care Time spent:   Patient is a 63y old  Male who presents with a chief complaint of     HPI: Mr. Sánchez is a 63 year old man with gastric CA on chemotherapy, DM, hypothyroidism, presenting with staring spells.    His wife is at the bedside and is providing most of the history.    On 6/25/23 he was not feeling well. He was lightheaded and had a fever. He was taken by ambulance to [facility]. While the medical team was trying ot access his port his face turned red, he looked blank, he started to shake and he passed out. He was unconscious for a seconds and when he came to he was confused for a few seconds. After his first round of chemo he had a similar episode. He was admitted. He had a head CT and was admitted. He was discharged the next day. He went back to [facility] for hydration on 6/27. While there he passed out. He again had shaking and was staring. He was again taken to [facility] and admitted. He was treated with IV antibiotics because he was still running a fever. He tends to have fevers in the evening. He was discharged on 6/30. He had fever again on 7/1 and 7/2. He went back to [facility] and was again given IV antibiotics. He was discharged several hours later.    He was back at [facility] again today for hydration. He again had a blank stare and made a gasping sound. He did not pass out. Shortly later he had another blank stare. He passed out. He was unconscious for seconds. He was confused for a few seconds when he came to.    He denies history of prior seizures.  He is not on any new medications other than chemotherapy. His first session was on 6/1/23.          PAST MEDICAL & SURGICAL HISTORY:  IDDM (insulin dependent diabetes mellitus)      HTN (hypertension)      DM (diabetes mellitus)      Hypothyroidism      HTN (hypertension)      HLD (hyperlipidemia)      Lesion of stomach      No significant past surgical history          FAMILY HISTORY:  Family history of diabetes mellitus (DM) (Mother)        Social Hx:  Nonsmoker, no drug or alcohol use    MEDICATIONS  (STANDING):  He does not have a list with him and does not recall.       Allergies    Altace (Hives)  Seafood (Hives; Rash)    Intolerances        ROS: Pertinent positives in HPI, all other ROS were reviewed and are negative.      Vital Signs Last 24 Hrs  T(C): 36.8 (05 Jul 2023 10:42), Max: 36.8 (05 Jul 2023 10:42)  T(F): 98.3 (05 Jul 2023 10:42), Max: 98.3 (05 Jul 2023 10:42)  HR: 78 (05 Jul 2023 10:42) (78 - 78)  BP: 146/63 (05 Jul 2023 10:42) (146/63 - 146/63)  BP(mean): --  RR: 17 (05 Jul 2023 10:42) (17 - 17)  SpO2: 100% (05 Jul 2023 10:42) (100% - 100%)    Parameters below as of 05 Jul 2023 10:42  Patient On (Oxygen Delivery Method): room air            Constitutional: awake and alert.  HEENT: PERRLA, EOMI,   Neck: Supple.  Respiratory: Breath sounds are clear bilaterally  Cardiovascular: S1 and S2, regular / irregular rhythm  Gastrointestinal: soft, nontender  Extremities:  no edema  Vascular: Caritid Bruit - no  Musculoskeletal: no joint swelling/tenderness, no abnormal movements  Skin: No rashes    Neurological exam:  HF: A x O x 3. Appropriately interactive, normal affect. Speech fluent, No Aphasia or paraphasic errors. Naming /repetition intact   CN: BLANKA, EOMI, VFF, facial sensation normal, no NLFD, tongue midline, Palate moves equally, SCM equal bilaterally  Motor: No pronator drift, Strength 5/5 in all 4 ext, normal bulk and tone, no tremor, rigidity or bradykinesia.    Sens: Intact to light touch / PP/ VS/ JS    Reflexes: Symmetric and normal . BJ 2+, BR 2+, KJ 2+, AJ 2+, downgoing toes b/l  Coord:  No FNFA, dysmetria, LISSETH intact   Gait/Balance: Normal/Cannot test    NIHSS:          Labs:   07-05    135  |  108  |  8   ----------------------------<  157<H>  3.7   |  23  |  0.89    Ca    8.1<L>      05 Jul 2023 11:05  Phos  2.8     07-05  Mg     2.0     07-05    TPro  6.1  /  Alb  2.3<L>  /  TBili  0.4  /  DBili  x   /  AST  102<H>  /  ALT  114<H>  /  AlkPhos  163<H>  07-05                              11.3   7.41  )-----------( 190      ( 05 Jul 2023 11:05 )             34.0       Radiology:  CT head 7/5/23:   No acute intracranial hemorrhage, mass effect, or shift of   the midline structures.    Echo 6/28/23:   1. Left ventricular ejection fraction, by visual estimation, is 65%.   2. Normal global left ventricular systolic function.   3. Spectral Doppler shows impaired relaxation pattern of left   ventricular myocardial filling (Grade I diastolic dysfunction).   4. Normal left atrial size.   5. Normal right atrial size.   6. There is no evidence of pericardial effusion.   7. Trace mitral valve regurgitation.        Total Critical Care Time spent:   Patient is a 63y old  Male who presents with a chief complaint of episodes of unresponsiveness    HPI: Mr. Sánchez is a 63 year old man with gastric CA on chemotherapy, DM, hypothyroidism, presenting with staring spells.    His significant other is at the bedside and is providing most of the history.    On 6/25/23 he was not feeling well. He was lightheaded and had a fever. He was taken by ambulance to [facility]. While the medical team was trying ot access his port his face turned red, he looked blank, he started to shake and he passed out. He was unconscious for a seconds and when he came to he was confused for a few seconds. After his first round of chemo he had a similar episode. He was admitted. He had a head CT and was admitted. He was discharged the next day. He went back to [facility] [place] hydration on 6/27. While there he passed out. He again had shaking and was staring. He was again taken to [facility] and admitted. He was treated with IV antibiotics because he was still running a fever. He tends to have fevers in the evening. He was discharged on 6/30. He had fever again on 7/1 and 7/2. He went back to [facility] and was again given IV antibiotics. He was discharged several hours later.    He was back at [facility] again today for hydration. He again had a blank stare and made a gasping sound. He did not pass out. Shortly later he had another blank stare. He passed out. He was unconscious for seconds. He was confused for a few seconds when he came to.    He reports that his events are preceded by feeling lightheaded.  There is no associated tongue bite or urinary incontinence.     He denies history of prior seizures.  He is not on any new medications other than chemotherapy. His first session was on 6/1/23.          PAST MEDICAL & SURGICAL HISTORY:  IDDM (insulin dependent diabetes mellitus)      HTN (hypertension)      DM (diabetes mellitus)      Hypothyroidism      HTN (hypertension)      HLD (hyperlipidemia)      Lesion of stomach      No significant past surgical history          FAMILY HISTORY:  Family history of diabetes mellitus (DM) (Mother)        Social Hx:  Nonsmoker, no drug or alcohol use    MEDICATIONS  (STANDING):  He does not have a list with him and does not recall.       Allergies    Altace (Hives)  Seafood (Hives; Rash)    Intolerances        ROS: Pertinent positives in HPI, all other ROS were reviewed and are negative.      Vital Signs Last 24 Hrs  T(C): 36.8 (05 Jul 2023 10:42), Max: 36.8 (05 Jul 2023 10:42)  T(F): 98.3 (05 Jul 2023 10:42), Max: 98.3 (05 Jul 2023 10:42)  HR: 78 (05 Jul 2023 10:42) (78 - 78)  BP: 146/63 (05 Jul 2023 10:42) (146/63 - 146/63)  BP(mean): --  RR: 17 (05 Jul 2023 10:42) (17 - 17)  SpO2: 100% (05 Jul 2023 10:42) (100% - 100%)    Parameters below as of 05 Jul 2023 10:42  Patient On (Oxygen Delivery Method): room air            Constitutional: awake and alert.  HEENT: PERRLA, EOMI,   Neck: Supple.  Respiratory: Breath sounds are clear bilaterally  Cardiovascular: S1 and S2, regular / irregular rhythm  Gastrointestinal: soft, nontender  Extremities:  no edema  Musculoskeletal: no joint swelling/tenderness, no abnormal movements  Skin: No rashes    Neurological exam:  HF: A x O x 3. Appropriately interactive, normal affect. Speech fluent, No Aphasia or paraphasic errors.   CN: BLANKA, EOMI, VFF, facial sensation normal, no NLFD, tongue midline, Palate moves equally, SCM equal bilaterally  Motor: No pronator drift, Strength 5/5 in all 4 ext, normal bulk and tone, no tremor, rigidity or bradykinesia.    Sens: Intact to light touch  Reflexes: Symmetric and normal . BJ 1+, BR 1+, KJ 1+,  downgoing toes b/l  Coord:  No FNFA, dysmetria, LISSETH intact   Gait/Balance: Not tested    NIHSS: 0          Labs:   07-05    135  |  108  |  8   ----------------------------<  157<H>  3.7   |  23  |  0.89    Ca    8.1<L>      05 Jul 2023 11:05  Phos  2.8     07-05  Mg     2.0     07-05    TPro  6.1  /  Alb  2.3<L>  /  TBili  0.4  /  DBili  x   /  AST  102<H>  /  ALT  114<H>  /  AlkPhos  163<H>  07-05                              11.3   7.41  )-----------( 190      ( 05 Jul 2023 11:05 )             34.0       Radiology:  CT head 7/5/23:   No acute intracranial hemorrhage, mass effect, or shift of   the midline structures.    Echo 6/28/23:   1. Left ventricular ejection fraction, by visual estimation, is 65%.   2. Normal global left ventricular systolic function.   3. Spectral Doppler shows impaired relaxation pattern of left   ventricular myocardial filling (Grade I diastolic dysfunction).   4. Normal left atrial size.   5. Normal right atrial size.   6. There is no evidence of pericardial effusion.   7. Trace mitral valve regurgitation.

## 2023-07-05 NOTE — ED PROVIDER NOTE - NSCAREINITIATED _GEN_ER
Addended by: Gaetano Epstein on: 4/16/2021 08:50 AM     Modules accepted: Orders
Addended by: Idania Summers on: 4/16/2021 09:04 AM     Modules accepted: Orders
Addended by: Mireya Smith on: 4/19/2021 11:29 AM     Modules accepted: Orders
Addended by: Pedro Norton on: 4/22/2021 09:32 AM     Modules accepted: Orders
Vivien Clarke(Attending)

## 2023-07-05 NOTE — ED ADULT NURSE NOTE - OBJECTIVE STATEMENT
Pt presents to er from [facility] with complaints of fainting episodes with history of stomach CA, denies fevers, chest pain, sob, sent in for further evaluation.

## 2023-07-05 NOTE — ED PROVIDER NOTE - OBJECTIVE STATEMENT
62 yo male with PMHx of stomach cancer, HLD, HTN, hypothyroidism, IDDM presents to ED s/p syncopal episode at [facility] in [place] where he was receiving regular fluids. +lightheaded, -blurry vision prior to episode. his eyes rolled back, became diaphoretic, and he thinks he passed out in the chair. denies head strike.. pt currently endorses lightheadedness. denies cp, n/v, fever, chills, dysuria, abd pain. denies hx of seizure. denies recent falls. pt on chemo, no session today, last session 6/19. pt has admitted to hospital for similar sx, dc 6/30. 64 yo male with PMHx of stomach cancer, HLD, HTN, hypothyroidism, IDDM presents to ED s/p syncopal episode at [facility] in [place] where he was receiving regular fluids. +lightheaded,  No blurry vision prior to episode. his eyes rolled back, became diaphoretic, and he thinks he passed out in the chair. denies head strike. pt currently endorses lightheadedness. denies cp, n/v, fever, chills, dysuria, abd pain. denies hx of seizure. denies recent falls. pt on chemo, no session today, last session 6/19. pt has admitted to hospital for similar sx, dc 6/30.  wife reported he is pending neurology and EEG evaluation outpatient.

## 2023-07-05 NOTE — ED PROVIDER NOTE - PHYSICAL EXAMINATION
General: pt mildly diaphoretic, in no acute distress, AAOX3.   HENMT: NC/AT, no nasal congestion, moist mucous membranes, no lesions in oral mucosa. PERRLA.   Neck: supple, no LAD  CVS: regular rate and rhythm, normal S1 and S2, no murmur, rubs or gallop  Resp: Good air entry bilaterally, lungs clear to auscultation,  No wheeze/rubs/rhonchi appreciated.  Abd: Soft non tender, non distended, +bowel sounds, No guarding, rebound tenderness or organomegaly  Ext: Full range of motion in all ext, 2+ pulses throughout, cap refill<2 sec.  NEURO: no focal deficit, CN III-XII intact, fluent with names objects, repeats phrases, follows commands. PERRL, EOMF, facial sensory and motor intact, no pronator drift, motor strength 5/5 throughout, tone WNL, DTRs  equal, Sensory Intact to touch. General: pt in no acute distress, AAOX3.   HENMT: NC/AT, no nasal congestion, moist mucous membranes, no lesions in oral mucosa. PERRLA.   Neck: supple, no LAD  CVS: regular rate and rhythm, normal S1 and S2, no murmur  Resp: Good air entry bilaterally, lungs clear to auscultation,  No wheeze.  Abd: Soft non tender, non distended, +bowel sounds, No guarding, rebound tenderness  Ext: Full range of motion in all ext, 2+ pulses throughout, cap refill<2 sec.  NEURO: no focal deficit, CN III-XII intact, fluent with names objects, repeats phrases, follows commands. PERRL, EOMF, facial sensory and motor intact, no pronator drift, motor strength 5/5 throughout, tone WNL, DTRs  equal, Sensory Intact to touch.

## 2023-07-05 NOTE — ED PROVIDER NOTE - NS ED MD DISPO SPECIAL CONSIDERATION1
None pt c/o seasonal allergies, nasal congestion and itchy/watery eyes since weekend, no past medical history

## 2023-07-05 NOTE — ED PROVIDER NOTE - PROGRESS NOTE DETAILS
Spoke too Neurology on Call Dr. Joseph, for EEG and neuro eval for seizure.  CTB No acute pathology. Labs WNL. pt reported he feels better. s/p iv fluids

## 2023-07-05 NOTE — ED ADULT TRIAGE NOTE - CHIEF COMPLAINT QUOTE
Patient presents to the ER with complaints of possible syncope vs seizure. Per patient, he was at [facility] and his eyes rolled back, became diaphoretic, and he thinks he passed out. Patient with RCW port being treated for stomach cancer.

## 2023-07-06 LAB
APPEARANCE UR: CLEAR — SIGNIFICANT CHANGE UP
BACTERIA # UR AUTO: NEGATIVE — SIGNIFICANT CHANGE UP
BILIRUB UR-MCNC: NEGATIVE — SIGNIFICANT CHANGE UP
COLOR SPEC: YELLOW — SIGNIFICANT CHANGE UP
DIFF PNL FLD: ABNORMAL
EPI CELLS # UR: SIGNIFICANT CHANGE UP
GLUCOSE BLDC GLUCOMTR-MCNC: 131 MG/DL — HIGH (ref 70–99)
GLUCOSE BLDC GLUCOMTR-MCNC: 168 MG/DL — HIGH (ref 70–99)
GLUCOSE BLDC GLUCOMTR-MCNC: 197 MG/DL — HIGH (ref 70–99)
GLUCOSE BLDC GLUCOMTR-MCNC: 202 MG/DL — HIGH (ref 70–99)
GLUCOSE BLDC GLUCOMTR-MCNC: 342 MG/DL — HIGH (ref 70–99)
GLUCOSE UR QL: 1000 MG/DL
KETONES UR-MCNC: ABNORMAL
LACTATE SERPL-SCNC: 1.4 MMOL/L — SIGNIFICANT CHANGE UP (ref 0.7–2)
LEUKOCYTE ESTERASE UR-ACNC: NEGATIVE — SIGNIFICANT CHANGE UP
NITRITE UR-MCNC: NEGATIVE — SIGNIFICANT CHANGE UP
PH UR: 7 — SIGNIFICANT CHANGE UP (ref 5–8)
PROCALCITONIN SERPL-MCNC: 0.5 NG/ML — HIGH (ref 0.02–0.1)
PROT UR-MCNC: SIGNIFICANT CHANGE UP MG/DL
RBC CASTS # UR COMP ASSIST: SIGNIFICANT CHANGE UP /HPF (ref 0–4)
SP GR SPEC: 1 — LOW (ref 1.01–1.02)
UROBILINOGEN FLD QL: NEGATIVE — SIGNIFICANT CHANGE UP
WBC UR QL: SIGNIFICANT CHANGE UP /HPF (ref 0–5)

## 2023-07-06 PROCEDURE — 99232 SBSQ HOSP IP/OBS MODERATE 35: CPT

## 2023-07-06 PROCEDURE — 99497 ADVNCD CARE PLAN 30 MIN: CPT | Mod: 25

## 2023-07-06 PROCEDURE — 99223 1ST HOSP IP/OBS HIGH 75: CPT

## 2023-07-06 PROCEDURE — 95816 EEG AWAKE AND DROWSY: CPT | Mod: 26

## 2023-07-06 RX ORDER — DEXTROSE 50 % IN WATER 50 %
12.5 SYRINGE (ML) INTRAVENOUS ONCE
Refills: 0 | Status: DISCONTINUED | OUTPATIENT
Start: 2023-07-06 | End: 2023-07-07

## 2023-07-06 RX ORDER — HYDROCHLOROTHIAZIDE 25 MG
1 TABLET ORAL
Refills: 0 | DISCHARGE

## 2023-07-06 RX ORDER — INSULIN GLARGINE 100 [IU]/ML
10 INJECTION, SOLUTION SUBCUTANEOUS AT BEDTIME
Refills: 0 | Status: DISCONTINUED | OUTPATIENT
Start: 2023-07-06 | End: 2023-07-07

## 2023-07-06 RX ORDER — LEVOTHYROXINE SODIUM 125 MCG
50 TABLET ORAL DAILY
Refills: 0 | Status: DISCONTINUED | OUTPATIENT
Start: 2023-07-06 | End: 2023-07-07

## 2023-07-06 RX ORDER — LOSARTAN POTASSIUM 100 MG/1
100 TABLET, FILM COATED ORAL DAILY
Refills: 0 | Status: DISCONTINUED | OUTPATIENT
Start: 2023-07-06 | End: 2023-07-07

## 2023-07-06 RX ORDER — DEXTROSE 50 % IN WATER 50 %
25 SYRINGE (ML) INTRAVENOUS ONCE
Refills: 0 | Status: DISCONTINUED | OUTPATIENT
Start: 2023-07-06 | End: 2023-07-07

## 2023-07-06 RX ORDER — GLUCAGON INJECTION, SOLUTION 0.5 MG/.1ML
1 INJECTION, SOLUTION SUBCUTANEOUS ONCE
Refills: 0 | Status: DISCONTINUED | OUTPATIENT
Start: 2023-07-06 | End: 2023-07-07

## 2023-07-06 RX ORDER — ACETAMINOPHEN 500 MG
650 TABLET ORAL EVERY 6 HOURS
Refills: 0 | Status: DISCONTINUED | OUTPATIENT
Start: 2023-07-06 | End: 2023-07-07

## 2023-07-06 RX ORDER — INSULIN LISPRO 100/ML
8 VIAL (ML) SUBCUTANEOUS ONCE
Refills: 0 | Status: COMPLETED | OUTPATIENT
Start: 2023-07-06 | End: 2023-07-06

## 2023-07-06 RX ORDER — HEPARIN SODIUM 5000 [USP'U]/ML
5000 INJECTION INTRAVENOUS; SUBCUTANEOUS EVERY 8 HOURS
Refills: 0 | Status: DISCONTINUED | OUTPATIENT
Start: 2023-07-06 | End: 2023-07-07

## 2023-07-06 RX ORDER — DEXTROSE 50 % IN WATER 50 %
15 SYRINGE (ML) INTRAVENOUS ONCE
Refills: 0 | Status: DISCONTINUED | OUTPATIENT
Start: 2023-07-06 | End: 2023-07-07

## 2023-07-06 RX ORDER — SODIUM CHLORIDE 9 MG/ML
1000 INJECTION, SOLUTION INTRAVENOUS
Refills: 0 | Status: DISCONTINUED | OUTPATIENT
Start: 2023-07-06 | End: 2023-07-07

## 2023-07-06 RX ORDER — CHOLECALCIFEROL (VITAMIN D3) 125 MCG
1000 CAPSULE ORAL DAILY
Refills: 0 | Status: DISCONTINUED | OUTPATIENT
Start: 2023-07-06 | End: 2023-07-07

## 2023-07-06 RX ORDER — INSULIN LISPRO 100/ML
VIAL (ML) SUBCUTANEOUS
Refills: 0 | Status: DISCONTINUED | OUTPATIENT
Start: 2023-07-06 | End: 2023-07-07

## 2023-07-06 RX ORDER — INSULIN LISPRO 100/ML
8 VIAL (ML) SUBCUTANEOUS
Refills: 0 | Status: DISCONTINUED | OUTPATIENT
Start: 2023-07-06 | End: 2023-07-07

## 2023-07-06 RX ORDER — ATORVASTATIN CALCIUM 80 MG/1
40 TABLET, FILM COATED ORAL AT BEDTIME
Refills: 0 | Status: DISCONTINUED | OUTPATIENT
Start: 2023-07-06 | End: 2023-07-07

## 2023-07-06 RX ORDER — SODIUM CHLORIDE 9 MG/ML
1000 INJECTION, SOLUTION INTRAVENOUS ONCE
Refills: 0 | Status: COMPLETED | OUTPATIENT
Start: 2023-07-06 | End: 2023-07-06

## 2023-07-06 RX ADMIN — ATORVASTATIN CALCIUM 40 MILLIGRAM(S): 80 TABLET, FILM COATED ORAL at 21:44

## 2023-07-06 RX ADMIN — Medication 650 MILLIGRAM(S): at 01:46

## 2023-07-06 RX ADMIN — Medication 2: at 08:09

## 2023-07-06 RX ADMIN — Medication 8 UNIT(S): at 18:01

## 2023-07-06 RX ADMIN — SODIUM CHLORIDE 2000 MILLILITER(S): 9 INJECTION, SOLUTION INTRAVENOUS at 04:39

## 2023-07-06 RX ADMIN — HEPARIN SODIUM 5000 UNIT(S): 5000 INJECTION INTRAVENOUS; SUBCUTANEOUS at 22:59

## 2023-07-06 RX ADMIN — INSULIN GLARGINE 10 UNIT(S): 100 INJECTION, SOLUTION SUBCUTANEOUS at 21:43

## 2023-07-06 RX ADMIN — Medication 8 UNIT(S): at 12:14

## 2023-07-06 RX ADMIN — Medication 1000 UNIT(S): at 18:02

## 2023-07-06 RX ADMIN — Medication 8 UNIT(S): at 08:08

## 2023-07-06 RX ADMIN — Medication 4: at 18:02

## 2023-07-06 RX ADMIN — LOSARTAN POTASSIUM 100 MILLIGRAM(S): 100 TABLET, FILM COATED ORAL at 18:02

## 2023-07-06 RX ADMIN — Medication 8 UNIT(S): at 04:39

## 2023-07-06 RX ADMIN — Medication 650 MILLIGRAM(S): at 00:41

## 2023-07-06 RX ADMIN — Medication 3 MILLIGRAM(S): at 21:44

## 2023-07-06 RX ADMIN — Medication 650 MILLIGRAM(S): at 16:56

## 2023-07-06 NOTE — H&P ADULT - HISTORY OF PRESENT ILLNESS
64 y/o male recently admitted for recurrent syncope,  He has aGastric AdenoCa on Chemo last chemo with folfox and docetaxel on 6/16/23.  During that stay, he was leukopenic, had fever, Cxs NTD but was dced home on amoxicillin  Recurrent syncope due to dehydration and DKA.  At  home continued to have fever for the last three days, so returned to the hospital.         PMH:   Gastric AdenoCa on Chemo last chemo with folfox and docetaxel on 6/16/23.  Syncope   Hyponatremia   DM, Type 1  HTN/HLD   Hypothyroidism     FH:      SH:         64 y/o male recently admitted for recurrent syncope,  He has aGastric AdenoCa on Chemo last chemo with folfox and docetaxel on 6/16/23.  During that stay, he was leukopenic, had fever, Cxs NTD but was dced home on amoxicillin  Recurrent syncope was thought to be due to dehydration and DKA.    At this time, he notes that he continues to have dehydration,  he went to [facility] for IVFs and per chart review had  a staring spell and  passed out for a few seconds. He was briefly confused. In addition he continued to have fever for the last three days that he was at home. Temps have been 100.4 to 100.7.  He reports occ lightheadedness when he gets up, resolves immediately   He feels good now and would like to go home, has been ambualting in the hallway.  Denies ha cp palps sob abdo pain tingling or numbness anywhere.   10 point ROS is otherwise neg      PMH:   Gastric AdenoCa on Chemo last chemo with folfox and docetaxel on 6/16/23.  Syncope   Hyponatremia   DM, Type 1  HTN/HLD   Hypothyroidism     FH:  Mom - DM CAD    SH:  lives with his girlfriend  TOB - none  ETOH - none

## 2023-07-06 NOTE — H&P ADULT - ASSESSMENT
62 y/o male with PMH of DM-1, gastric adenocarcinoma on chemo, hypothyroidism, HTN, HLD came to the ED s/p syncope  Recurrent fever s/p discharge       Gastric AdenoCa on Chemo last chemo with folfox and docetaxel on 6/16/23.  Syncope   Hyponatremia   DM, Type 1  HTN/HLD   Hypothyroidism              64 y/o male with  frequent need for IV hydration, recurrent syncope, blank staring spells  Recurrent fever s/p discharge   Gastric AdenoCa on Chemo last chemo with folfox and docetaxel on 6/16/23.  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

## 2023-07-06 NOTE — H&P ADULT - NSHPLABSRESULTS_GEN_ALL_CORE
LABS: All Labs Reviewed:                        11.3   7.41  )-----------( 190      ( 05 Jul 2023 11:05 )             34.0     07-05    135  |  108  |  8   ----------------------------<  157<H>  3.7   |  23  |  0.89    Ca    8.1<L>      05 Jul 2023 11:05  Phos  2.8     07-05  Mg     2.0     07-05    TPro  6.1  /  Alb  2.3<L>  /  TBili  0.4  /  DBili  x   /  AST  102<H>  /  ALT  114<H>  /  AlkPhos  163<H>  07-05                  RADIOLOGY/EKG:      acetaminophen     Tablet .. 650 milliGRAM(s) Oral every 6 hours PRN  dextrose 5%. 1000 milliLiter(s) IV Continuous <Continuous>  dextrose 5%. 1000 milliLiter(s) IV Continuous <Continuous>  dextrose 50% Injectable 25 Gram(s) IV Push once  dextrose 50% Injectable 12.5 Gram(s) IV Push once  dextrose 50% Injectable 25 Gram(s) IV Push once  dextrose Oral Gel 15 Gram(s) Oral once PRN  glucagon  Injectable 1 milliGRAM(s) IntraMuscular once  insulin glargine Injectable (LANTUS) 10 Unit(s) SubCutaneous at bedtime  insulin lispro (ADMELOG) corrective regimen sliding scale   SubCutaneous three times a day before meals  insulin lispro Injectable (ADMELOG) 8 Unit(s) SubCutaneous three times a day before meals  melatonin 3 milliGRAM(s) Oral at bedtime  ondansetron Injectable 4 milliGRAM(s) IV Push every 8 hours PRN

## 2023-07-06 NOTE — CONSULT NOTE ADULT - ASSESSMENT
64 yo M with history of recent diagnosis of Gastric Adenocarcinoma presently on FLOT neoajduvantly here with syncopal episode     # Gastric Adenocarcinoma   - follows with Dr. jodee Abrams and Dr. Huerta   - now undergoing Neoadjuvant FLOT   - s/p Cycle One     # Syncope   - ct head neg   - would proceed with MRI Brain W/WO   -TELE admit   - would continue with hydration   - neurology evaluation appreciated.   
Mr. Sánchez is a 63 year old man with gastric CA on chemotherapy, DM, hypothyroidism, presenting with staring spells and loss of consciousness.  He has had multiple episodes of loss of consciousness as well as episodes of staring without clear loss of consciousness.  He also reports fevers.    He has a non-focal neurological examination at this time.    Episodes are likely convulsive syncope and pre-syncope given preceding lightheadedness, brief duration of LOC and lack of postictal confusion.  However, an EEG certainly can be performed if the patient is admitted to rule out any underlying epileptiform activity.  Would not start anticonvulsants at this time.  If focal abnormalities are seen on EEG, he should have MRI brain.  No driving at this time.    Discussed with Dr. Calix.

## 2023-07-06 NOTE — H&P ADULT - CONVERSATION DETAILS
diagnosed with gastric adeno ca in 3/2023   optimistic that he will respond to chemo  discussed CPR and ETT with patient but at this time prefers to be full code    time spent - 17 mins

## 2023-07-06 NOTE — CONSULT NOTE ADULT - SUBJECTIVE AND OBJECTIVE BOX
Date of visit: 2022      Chief Complaint   Patient presents with   • Follow-up     ER follow up       MA note appreciated and accepted.      HISTORY OF PRESENT ILLNESS:  Mr Jacobo Flower who is a pt of Charlie Valentine MD presents with his aunt, [place] with c/o N&V x 2.5 weeks - finally easing in the past few days.  Feels like he is getting better - was able to eat a sub today & keep it down.  Taking omeprazole most days.  Ran out of zofran about a week ago.   Smoking pot 2-3 x / day.  When heavy he smokes 4-5 x / day.  LBM 5 days ago.  Normally has stools about q 3 days - doesn't eat many veggies, drinks lots of caffeine.   Rare ETOH, no NSAIDs    Had earlier episodes of N&V - maybe from pot?    Lots of stress.  Dad  suddenly - he was only 45. They don't know the cause.  Mom  of cancer when pt was 10 yo.  Also, grandmother  recently.  Pt has been living with grandmother, grandfather, aunt.  Was close with his dad.   They are grieving.   Pt had been to grief sessions but doesn't feel he needs to go anymore.      Grandmother was the cook.  Since she passed they are doing mostly carry out meals.     Hx of psychosis last year in April - hospitalized May 5th - .    Sudden onset & sudden end.  Talking [employer], hallucinating, wanders away, would get instantly agitated - this is all out of character.   Sometimes has thoughts of death - no plan.   Denies any hallucinations  Aunt wonders if he mirrors her a bit - she is struggling with grief.    Also hx of anxiety - heart races, can't settle or sleep.     Works 9am - 3 pm detailing cars.         Past Medical History:   Diagnosis Date   • Mild intermittent asthma     Hasn't needed inhaler in years         REVIEW OF SYSTEMS:  Constitutional: does not feel ill  Cardiovascular: Denies chest pain, palpitations or pedal edema  Respiratory: Denies cough, SOB, wheeze        EXAM:    Visit Vitals  BP (!) 129/91 Comment: per np   Pulse (!) 104 Comment: reg   Wt  83.5 kg (184 lb)   SpO2 98%   BMI 26.40 kg/m²     General: pleasant 23 year old male in no acute distress.  Cardiovascular: regular tachycardia, no murmur appreciated, no pedal edema  Respiratory: CTA bilateral, normal resp effort  Psychosocial: dressed appropriately for the weather; speech patterns show clear thinking.     Recent Review Flowsheet Data     Date 6/16/2022    Adult PHQ 2 Score 3    Adult PHQ 2 Interpretation Further screening needed    Little interest or pleasure in activity? More than half the days    Feeling down, depressed or hopeless? Several days    Adult PHQ 9 Score 15    Adult PHQ 9 Interpretation Moderately Severe Depression    Trouble falling or staying asleep or sleeping all the time? Nearly every day    Feeling tired or having little energy? Several days    Poor appetite or overeating? Nearly every day    Feeling bad about yourself or that you are a failure or have let yourself or family down? Several days    Trouble concentrating on things such as reading the newspaper or watching TV? More than half the days    Moving or speaking slowly that other people have noticed or the opposite - being so fidgety or restless that you have been moving around a lot more than usual? Several days    Thoughts that you would be better off dead or of hurting yourself in some way? Several days    If you reported any problems, how difficult have these problems made it to do your work, take care of things at home, or get along with other people? Somewhat difficult        Generalized Anxiety Disorder (GAD7)    Over the last 2 weeks, how often have you been bothered by the following problems?   Score: 13    Score:  0-4 minimal symptoms 10-14 moderate symptoms   5-9 mild symptoms 15-21 severe symptoms      1.  Feeling nervous, anxious or on edge Several days   2.  Not being able to stop or control worrying Nearly every day   3.  Worrying too much about different things Nearly every day   4.  Trouble relaxing  Several days   5.  Being so restless that it's hard to sit still Several days   6.  Becoming easily annoyed or irritable More than half the days   7.  Feeling afraid as if something awful might happen More than half the days            If you checked off any problems, how difficult have these made it for you to do your work, take care of things at home, or get along with other people? Somewhat difficult            ASSESSMENT:   1. Nausea and vomiting in adult    2. Elevated blood pressure reading without diagnosis of hypertension    3. Depression with anxiety        PLAN:   Pt does not feel he needs any behav health meds at this time.  We discussed that N&V may be related to heavy pot use  We discussed that elevated BP may be related to high sodium intake.   Learning to cook is a way to carry out grandmother's legacy.    Tonight - magnesium sulfate - 1/2 bottle, push fluids, the rest if no results. OR Milk of magnesia as needed for constipation.  MOM in the PM for a BM in the AM  Miralax daily to keep going  Work on increasing veggies, less carry out, less sodium - read labels.   Limit pot to 0-1 / day.  Continue omeprazole daily  Use mylanta as needed for heartburn      FU: Recheck in 2-4 weeks     Written information given   Patient states understanding and agreement with plan  Collaborating physician: Dr Francisca Garcia     HPI:  This is a 64 yo male presently followed by  Dr. Sofie Abrams and diagnosed with Gastric Adenocarcinoma with locoregional lymphadenopathy recently started on FLOT neoadjuvant therapy. Patient has received last cycle on 6.19 and has required intermittent hydration appts. Patient presented to outpatient [facility] for hydration and had syncopal event. Patient was  brought to ED for evaluation and has been seen  by Neurology and CT neg   planned for EEG today.     PAST MEDICAL & SURGICAL HISTORY:  IDDM (insulin dependent diabetes mellitus)      HTN (hypertension)      DM (diabetes mellitus)      Hypothyroidism      HTN (hypertension)      HLD (hyperlipidemia)      Lesion of stomach      No significant past surgical history          Allergies    Altace (Hives)  Seafood (Hives; Rash)    Intolerances        MEDICATIONS  (STANDING):  dextrose 5%. 1000 milliLiter(s) (50 mL/Hr) IV Continuous <Continuous>  dextrose 5%. 1000 milliLiter(s) (100 mL/Hr) IV Continuous <Continuous>  dextrose 50% Injectable 25 Gram(s) IV Push once  dextrose 50% Injectable 12.5 Gram(s) IV Push once  dextrose 50% Injectable 25 Gram(s) IV Push once  glucagon  Injectable 1 milliGRAM(s) IntraMuscular once  insulin glargine Injectable (LANTUS) 10 Unit(s) SubCutaneous at bedtime  insulin lispro (ADMELOG) corrective regimen sliding scale   SubCutaneous three times a day before meals  insulin lispro Injectable (ADMELOG) 8 Unit(s) SubCutaneous three times a day before meals  melatonin 3 milliGRAM(s) Oral at bedtime    MEDICATIONS  (PRN):  acetaminophen     Tablet .. 650 milliGRAM(s) Oral every 6 hours PRN Temp greater or equal to 38C (100.4F), Moderate Pain (4 - 6)  dextrose Oral Gel 15 Gram(s) Oral once PRN Blood Glucose LESS THAN 70 milliGRAM(s)/deciliter  ondansetron Injectable 4 milliGRAM(s) IV Push every 8 hours PRN Nausea and/or Vomiting      FAMILY HISTORY:  Family history of diabetes mellitus (DM) (Mother)        SOCIAL HISTORY: No EtOH, no tobacco    REVIEW OF SYSTEMS:    CONSTITUTIONAL: No weakness, fevers or chills  EYES/ENT: No visual changes;  No vertigo or throat pain   NECK: No pain or stiffness  RESPIRATORY: No cough, wheezing, hemoptysis; No shortness of breath  CARDIOVASCULAR: No chest pain or palpitations  GASTROINTESTINAL: No abdominal or epigastric pain. No nausea, vomiting, or hematemesis; No diarrhea or constipation. No melena or hematochezia.  GENITOURINARY: No dysuria, frequency or hematuria  NEUROLOGICAL: No numbness or weakness  SKIN: No itching, burning, rashes, or lesions   All other review of systems is negative unless indicated above.    Height (cm): 165.1 (07-05 @ 10:42)  Weight (kg): 79.4 (07-05 @ 10:42)  BMI (kg/m2): 29.1 (07-05 @ 10:42)  BSA (m2): 1.87 (07-05 @ 10:42)    T(F): 99.6 (07-06-23 @ 01:49), Max: 100.7 (07-05-23 @ 23:00)  HR: 102 (07-05-23 @ 21:31)  BP: 169/64 (07-05-23 @ 21:31)  RR: 18 (07-05-23 @ 21:31)  SpO2: 100% (07-05-23 @ 21:31)  Wt(kg): --    GENERAL: NAD, well-developed  HEAD:  Atraumatic, Normocephalic  EYES: EOMI, PERRLA, conjunctiva and sclera clear  NECK: Supple, No JVD  CHEST/LUNG: Clear to auscultation bilaterally; No wheeze  HEART: Regular rate and rhythm; No murmurs, rubs, or gallops  ABDOMEN: Soft, Nontender, Nondistended; Bowel sounds present  EXTREMITIES:  2+ Peripheral Pulses, No clubbing, cyanosis, or edema  NEUROLOGY: non-focal  SKIN: No rashes or lesions                          11.3   7.41  )-----------( 190      ( 05 Jul 2023 11:05 )             34.0       07-05    135  |  108  |  8   ----------------------------<  157<H>  3.7   |  23  |  0.89    Ca    8.1<L>      05 Jul 2023 11:05  Phos  2.8     07-05  Mg     2.0     07-05    TPro  6.1  /  Alb  2.3<L>  /  TBili  0.4  /  DBili  x   /  AST  102<H>  /  ALT  114<H>  /  AlkPhos  163<H>  07-05      Magnesium: 2.0 mg/dL (07-05 @ 11:05)  Phosphorus: 2.8 mg/dL (07-05 @ 11:05)          Clean Catch Clean Catch (Midstream)  07-02 @ 22:30   No growth  --  --      .Blood Blood-Peripheral  07-02 @ 22:00   No growth at 72 Hours  --  --      .Blood Blood-Peripheral  07-02 @ 21:55   No growth at 72 Hours  --  --      .Blood Blood-Peripheral  06-27 @ 19:50   No growth at 5 days  --  --      .Blood Blood-Peripheral  06-27 @ 19:45   No growth at 5 days  --  --      Clean Catch Clean Catch (Midstream)  06-27 @ 17:45   <10,000 CFU/mL Normal Urogenital Ayaka  --  --      Clean Catch Clean Catch (Midstream)  06-25 @ 19:44   No growth  --  --      .Blood Blood-Venous  06-25 @ 16:30   No growth at 5 days  --  --      .Blood Blood-Peripheral  06-25 @ 16:25   No growth at 5 days  --  --

## 2023-07-06 NOTE — H&P ADULT - NSHPPHYSICALEXAM_GEN_ALL_CORE
PHYSICAL EXAM:  T(F): 99 (06 Jul 2023 09:05), Max: 100.7 (05 Jul 2023 23:00)  HR: 89 (06 Jul 2023 09:05) (89 - 102)  BP: 130/66 (06 Jul 2023 09:05) (130/66 - 169/64)  BP(mean): 95 (05 Jul 2023 18:06) (95 - 95)  RR: 18 (06 Jul 2023 09:05) (18 - 19)  SpO2: 98% (06 Jul 2023 09:05) (97% - 100%)/RA   GENERAL: NAD, able to lie flat in bed  HEAD:  Atraumatic, Normocephalic  EYES: EOMI, PERRLA, normal sclera  ENT: Moist mucous membranes  NECK: Supple, No JVD, no nuchal rigidity  CHEST/LUNG: Clear to auscultation bilaterally; No rales, rhonchi, wheezing, or rubs. Unlabored respirations  HEART: Regular rate and rhythm; No murmurs, rubs, or gallops  ABDOMEN: Bowel sounds present; Soft, Nontender, Nondistended. No hepatomegaly  EXTREMITIES:  no pitting bilaterally  NERVOUS SYSTEM:  Alert & Oriented X3, speech clear. No focal motor or sensory deficits  MSK: FROM all 4 extremities, full and equal strength  SKIN: No rashes or lesions

## 2023-07-07 ENCOUNTER — TRANSCRIPTION ENCOUNTER (OUTPATIENT)
Age: 63
End: 2023-07-07

## 2023-07-07 VITALS — TEMPERATURE: 99 F

## 2023-07-07 LAB
ANION GAP SERPL CALC-SCNC: 5 MMOL/L — SIGNIFICANT CHANGE UP (ref 5–17)
BUN SERPL-MCNC: 10 MG/DL — SIGNIFICANT CHANGE UP (ref 7–23)
CALCIUM SERPL-MCNC: 8 MG/DL — LOW (ref 8.5–10.1)
CHLORIDE SERPL-SCNC: 104 MMOL/L — SIGNIFICANT CHANGE UP (ref 96–108)
CO2 SERPL-SCNC: 24 MMOL/L — SIGNIFICANT CHANGE UP (ref 22–31)
CREAT SERPL-MCNC: 0.77 MG/DL — SIGNIFICANT CHANGE UP (ref 0.5–1.3)
CULTURE RESULTS: SIGNIFICANT CHANGE UP
EGFR: 101 ML/MIN/1.73M2 — SIGNIFICANT CHANGE UP
GLUCOSE BLDC GLUCOMTR-MCNC: 152 MG/DL — HIGH (ref 70–99)
GLUCOSE BLDC GLUCOMTR-MCNC: 259 MG/DL — HIGH (ref 70–99)
GLUCOSE SERPL-MCNC: 247 MG/DL — HIGH (ref 70–99)
HCT VFR BLD CALC: 30.4 % — LOW (ref 39–50)
HGB BLD-MCNC: 10.2 G/DL — LOW (ref 13–17)
MAGNESIUM SERPL-MCNC: 2 MG/DL — SIGNIFICANT CHANGE UP (ref 1.6–2.6)
MCHC RBC-ENTMCNC: 26.7 PG — LOW (ref 27–34)
MCHC RBC-ENTMCNC: 33.6 GM/DL — SIGNIFICANT CHANGE UP (ref 32–36)
MCV RBC AUTO: 79.6 FL — LOW (ref 80–100)
PHOSPHATE SERPL-MCNC: 2.6 MG/DL — SIGNIFICANT CHANGE UP (ref 2.5–4.5)
PLATELET # BLD AUTO: 185 K/UL — SIGNIFICANT CHANGE UP (ref 150–400)
POTASSIUM SERPL-MCNC: 3.9 MMOL/L — SIGNIFICANT CHANGE UP (ref 3.5–5.3)
POTASSIUM SERPL-SCNC: 3.9 MMOL/L — SIGNIFICANT CHANGE UP (ref 3.5–5.3)
RBC # BLD: 3.82 M/UL — LOW (ref 4.2–5.8)
RBC # FLD: 16.9 % — HIGH (ref 10.3–14.5)
SODIUM SERPL-SCNC: 133 MMOL/L — LOW (ref 135–145)
SPECIMEN SOURCE: SIGNIFICANT CHANGE UP
WBC # BLD: 9.55 K/UL — SIGNIFICANT CHANGE UP (ref 3.8–10.5)
WBC # FLD AUTO: 9.55 K/UL — SIGNIFICANT CHANGE UP (ref 3.8–10.5)

## 2023-07-07 PROCEDURE — 99239 HOSP IP/OBS DSCHRG MGMT >30: CPT

## 2023-07-07 PROCEDURE — 99232 SBSQ HOSP IP/OBS MODERATE 35: CPT

## 2023-07-07 PROCEDURE — 95720 EEG PHY/QHP EA INCR W/VEEG: CPT

## 2023-07-07 RX ORDER — SODIUM CHLORIDE 9 MG/ML
1000 INJECTION INTRAMUSCULAR; INTRAVENOUS; SUBCUTANEOUS
Refills: 0 | Status: DISCONTINUED | OUTPATIENT
Start: 2023-07-07 | End: 2023-07-07

## 2023-07-07 RX ADMIN — HEPARIN SODIUM 5000 UNIT(S): 5000 INJECTION INTRAVENOUS; SUBCUTANEOUS at 14:33

## 2023-07-07 RX ADMIN — Medication 8 UNIT(S): at 08:27

## 2023-07-07 RX ADMIN — Medication 650 MILLIGRAM(S): at 02:52

## 2023-07-07 RX ADMIN — SODIUM CHLORIDE 100 MILLILITER(S): 9 INJECTION INTRAMUSCULAR; INTRAVENOUS; SUBCUTANEOUS at 09:50

## 2023-07-07 RX ADMIN — LOSARTAN POTASSIUM 100 MILLIGRAM(S): 100 TABLET, FILM COATED ORAL at 09:42

## 2023-07-07 RX ADMIN — Medication 6: at 08:27

## 2023-07-07 RX ADMIN — Medication 2: at 12:21

## 2023-07-07 RX ADMIN — Medication 650 MILLIGRAM(S): at 17:00

## 2023-07-07 RX ADMIN — HEPARIN SODIUM 5000 UNIT(S): 5000 INJECTION INTRAVENOUS; SUBCUTANEOUS at 06:00

## 2023-07-07 RX ADMIN — Medication 1000 UNIT(S): at 09:42

## 2023-07-07 RX ADMIN — Medication 50 MICROGRAM(S): at 06:01

## 2023-07-07 RX ADMIN — Medication 650 MILLIGRAM(S): at 03:22

## 2023-07-07 RX ADMIN — Medication 8 UNIT(S): at 12:21

## 2023-07-07 NOTE — DISCHARGE NOTE PROVIDER - CARE PROVIDER_API CALL
RAJ MORLEY  [address] [place] [address]  Phone: [phone]  Follow Up Time:     Laila Joseph  Neurology  [address] [facility], [address] [place] [address]  Phone: [phone]  Fax: [phone]  Follow Up Time: 1 week

## 2023-07-07 NOTE — DISCHARGE NOTE NURSING/CASE MANAGEMENT/SOCIAL WORK - NSDCPEFALRISK_GEN_ALL_CORE
For information on Fall & Injury Prevention, visit: [email or web address] OR  [email or web address] OR  https://www.cdc.gov/steadi/patient.html

## 2023-07-07 NOTE — PROGRESS NOTE ADULT - ASSESSMENT
Mr. Sánchez is a 63 year old man with gastric CA on chemotherapy, DM, hypothyroidism, presenting with staring spells and loss of consciousness.  He has had multiple episodes of loss of consciousness as well as episodes of staring without clear loss of consciousness.  He also reports fevers.    He has a non-focal neurological examination.    Episodes are likely convulsive syncope and pre-syncope given preceding lightheadedness, brief duration of LOC and lack of postictal confusion.  Routine EEG is normal  24 hour EEG is in progress but is thus far unremarkable.  Given the lack of focal findings on EEG, there is no urgency for MRI brain.      No driving at this time.      Please call back if additional input from neurology is needed.  Dr. Crandall will cover beginning on 7/8/23.

## 2023-07-07 NOTE — DISCHARGE NOTE NURSING/CASE MANAGEMENT/SOCIAL WORK - PATIENT PORTAL LINK FT
You can access the FollowMyHealth Patient Portal offered by [facility] by registering at the following website: [email or web address]. By joining [employer]’s FollowMyHealth portal, you will also be able to view your health information using other applications (apps) compatible with our system.

## 2023-07-07 NOTE — DISCHARGE NOTE PROVIDER - NSDCMRMEDTOKEN_GEN_ALL_CORE_FT
Apidra SoloStar Pen 100 units/mL injectable solution: 10 unit(s) subcutaneously 3 times a day (with meals)  atorvastatin 40 mg oral tablet: 1 tab(s) orally once a day  ezetimibe 10 mg oral tablet: 1 tab(s) orally once a day  levothyroxine 50 mcg (0.05 mg) oral tablet: 1 orally once a day  losartan 100 mg oral tablet: 1 orally once a day  Toujeo SoloStar 300 units/mL subcutaneous solution: 13 unit(s) subcutaneous once a day (at bedtime)  Vitamin D3 25 mcg (1000 intl units) oral tablet: 1 tab(s) orally once a day

## 2023-07-07 NOTE — PROGRESS NOTE ADULT - SUBJECTIVE AND OBJECTIVE BOX
INTERVAL HPI/OVERNIGHT EVENTS:  Patient S&E at bedside. No o/n events,  pt has been on EEG and thus far unremarkable  spiked fever at 4 pm yesterday 100.9, has been afebrile since - infectious w/u neg       PAST MEDICAL & SURGICAL HISTORY:  IDDM (insulin dependent diabetes mellitus)      HTN (hypertension)      DM (diabetes mellitus)      Hypothyroidism      HTN (hypertension)      HLD (hyperlipidemia)      Lesion of stomach      No significant past surgical history          FAMILY HISTORY:  Family history of diabetes mellitus (DM) (Mother)        VITAL SIGNS:  T(F): 98.9 (07-07-23 @ 05:05)  HR: 93 (07-07-23 @ 05:05)  BP: 147/67 (07-07-23 @ 05:05)  RR: 18 (07-07-23 @ 05:05)  SpO2: 99% (07-07-23 @ 05:05)  Wt(kg): --    PHYSICAL EXAM:    Constitutional: NAD, EEG leads on head   Eyes: EOMI,   Respiratory: CTA   Cardiovascular: RRR,  Gastrointestinal: soft, NTND,   Extremities: no c/c/e  Neurological: AAOx3      MEDICATIONS  (STANDING):  atorvastatin 40 milliGRAM(s) Oral at bedtime  cholecalciferol 1000 Unit(s) Oral daily  dextrose 5%. 1000 milliLiter(s) (50 mL/Hr) IV Continuous <Continuous>  dextrose 5%. 1000 milliLiter(s) (100 mL/Hr) IV Continuous <Continuous>  dextrose 50% Injectable 25 Gram(s) IV Push once  dextrose 50% Injectable 12.5 Gram(s) IV Push once  dextrose 50% Injectable 25 Gram(s) IV Push once  glucagon  Injectable 1 milliGRAM(s) IntraMuscular once  heparin   Injectable 5000 Unit(s) SubCutaneous every 8 hours  insulin glargine Injectable (LANTUS) 10 Unit(s) SubCutaneous at bedtime  insulin lispro (ADMELOG) corrective regimen sliding scale   SubCutaneous three times a day before meals  insulin lispro Injectable (ADMELOG) 8 Unit(s) SubCutaneous three times a day before meals  levothyroxine 50 MICROGram(s) Oral daily  losartan 100 milliGRAM(s) Oral daily  melatonin 3 milliGRAM(s) Oral at bedtime    MEDICATIONS  (PRN):  acetaminophen     Tablet .. 650 milliGRAM(s) Oral every 6 hours PRN Temp greater or equal to 38C (100.4F), Mild Pain (1 - 3)  aluminum hydroxide/magnesium hydroxide/simethicone Suspension 30 milliLiter(s) Oral every 4 hours PRN Dyspepsia  dextrose Oral Gel 15 Gram(s) Oral once PRN Blood Glucose LESS THAN 70 milliGRAM(s)/deciliter  ondansetron Injectable 4 milliGRAM(s) IV Push every 8 hours PRN Nausea and/or Vomiting      Allergies    Altace (Hives)  Seafood (Hives; Rash)    Intolerances        LABS:                        10.2   9.55  )-----------( 185      ( 07 Jul 2023 06:10 )             30.4     07-07    133<L>  |  104  |  10  ----------------------------<  247<H>  3.9   |  24  |  0.77    Ca    8.0<L>      07 Jul 2023 06:10  Phos  2.6     07-07  Mg     2.0     07-07    TPro  6.1  /  Alb  2.3<L>  /  TBili  0.4  /  DBili  x   /  AST  102<H>  /  ALT  114<H>  /  AlkPhos  163<H>  07-05      Urinalysis Basic - ( 07 Jul 2023 06:10 )    Color: x / Appearance: x / SG: x / pH: x  Gluc: 247 mg/dL / Ketone: x  / Bili: x / Urobili: x   Blood: x / Protein: x / Nitrite: x   Leuk Esterase: x / RBC: x / WBC x   Sq Epi: x / Non Sq Epi: x / Bacteria: x        RADIOLOGY & ADDITIONAL TESTS:  Studies reviewed.  
Interval History:  7/7/23: No new complaints.    MEDICATIONS  (STANDING):  atorvastatin 40 milliGRAM(s) Oral at bedtime  cholecalciferol 1000 Unit(s) Oral daily  dextrose 5%. 1000 milliLiter(s) (50 mL/Hr) IV Continuous <Continuous>  dextrose 5%. 1000 milliLiter(s) (100 mL/Hr) IV Continuous <Continuous>  dextrose 50% Injectable 25 Gram(s) IV Push once  dextrose 50% Injectable 12.5 Gram(s) IV Push once  dextrose 50% Injectable 25 Gram(s) IV Push once  glucagon  Injectable 1 milliGRAM(s) IntraMuscular once  heparin   Injectable 5000 Unit(s) SubCutaneous every 8 hours  insulin glargine Injectable (LANTUS) 10 Unit(s) SubCutaneous at bedtime  insulin lispro (ADMELOG) corrective regimen sliding scale   SubCutaneous three times a day before meals  insulin lispro Injectable (ADMELOG) 8 Unit(s) SubCutaneous three times a day before meals  levothyroxine 50 MICROGram(s) Oral daily  losartan 100 milliGRAM(s) Oral daily  melatonin 3 milliGRAM(s) Oral at bedtime    MEDICATIONS  (PRN):  acetaminophen     Tablet .. 650 milliGRAM(s) Oral every 6 hours PRN Temp greater or equal to 38C (100.4F), Mild Pain (1 - 3)  aluminum hydroxide/magnesium hydroxide/simethicone Suspension 30 milliLiter(s) Oral every 4 hours PRN Dyspepsia  dextrose Oral Gel 15 Gram(s) Oral once PRN Blood Glucose LESS THAN 70 milliGRAM(s)/deciliter  ondansetron Injectable 4 milliGRAM(s) IV Push every 8 hours PRN Nausea and/or Vomiting      Allergies    Altace (Hives)  Seafood (Hives; Rash)    Intolerances        PHYSICAL EXAM:  Vital Signs Last 24 Hrs  T(F): 98.9 (07-07-23 @ 05:05)  HR: 93 (07-07-23 @ 05:05)  BP: 147/67 (07-07-23 @ 05:05)  RR: 18 (07-07-23 @ 05:05)    GENERAL: NAD, well-groomed  HEAD:  Atraumatic, Normocephalic  Neuro:  Awake, alert, no aphasia  CN: PERRL, EOMI, no nystagmus, no facial weakness, tongue protrudes in the midline  motor: normal tone, no pronator drift, full strength in all four extremities  sensory: intact to light touch  coordination: finger to nose intact bilaterally  DTRs: symmetric, plantar responses flexor bilaterally    LABS:                        10.2   9.55  )-----------( 185      ( 07 Jul 2023 06:10 )             30.4     07-07    133<L>  |  104  |  10  ----------------------------<  247<H>  3.9   |  24  |  0.77    Ca    8.0<L>      07 Jul 2023 06:10  Phos  2.6     07-07  Mg     2.0     07-07    TPro  6.1  /  Alb  2.3<L>  /  TBili  0.4  /  DBili  x   /  AST  102<H>  /  ALT  114<H>  /  AlkPhos  163<H>  07-05      Urinalysis Basic - ( 07 Jul 2023 06:10 )    Color: x / Appearance: x / SG: x / pH: x  Gluc: 247 mg/dL / Ketone: x  / Bili: x / Urobili: x   Blood: x / Protein: x / Nitrite: x   Leuk Esterase: x / RBC: x / WBC x   Sq Epi: x / Non Sq Epi: x / Bacteria: x        RADIOLOGY & ADDITIONAL STUDIES:    CT head 7/5/23:   No acute intracranial hemorrhage, mass effect, or shift of   the midline structures.    Echo 6/28/23:   1. Left ventricular ejection fraction, by visual estimation, is 65%.   2. Normal global left ventricular systolic function.   3. Spectral Doppler shows impaired relaxation pattern of left   ventricular myocardial filling (Grade I diastolic dysfunction).   4. Normal left atrial size.   5. Normal right atrial size.   6. There is no evidence of pericardial effusion.   7. Trace mitral valve regurgitation.    EEG 7/6/23: normal

## 2023-07-07 NOTE — DISCHARGE NOTE PROVIDER - NSDCCPCAREPLAN_GEN_ALL_CORE_FT
PRINCIPAL DISCHARGE DIAGNOSIS  Diagnosis: Syncope  Assessment and Plan of Treatment: stay hydrated  do not drive  at this time the EEG is negative for any seizure-like activity   follow-up with Neurology

## 2023-07-07 NOTE — DISCHARGE NOTE PROVIDER - HOSPITAL COURSE
64 y/o male recently admitted for recurrent syncope,  He has a Gastric AdenoCa on Chemo; last chemo with folfox and docetaxel on 6/16/23.  During that stay, he was leukopenic, had fever, Cxs NTD but was dced home on amoxicillin  Recurrent syncope was thought to be due to dehydration and DKA.  At this time, he notes that he continues to have dehydration,  he went to [facility] for IVFs and per chart review had  a staring spell and  passed out for a few seconds. He was briefly confused. In addition he continued to have fever for the last three days that he was at home. Temps have been 100.4 to 100.7.  He reports occ lightheadedness when he gets up, resolves immediately   He feels good now and would like to go home, has been ambulating in the hallway.    HOSPITAL COURSE:   64 y/o male with  frequent need for IV hydration, recurrent syncope, blank staring spells  Recurrent fever s/p discharge   Gastric AdenoCa on Chemo last chemo with folfox and docetaxel on 6/16/23.  Hyponatremia   DM, Type 1  HTN/HLD   Hypothyroidism     Plan:  discussed with Neuro, s/p  VEEG which is unremarkable   "episodes are likely convulsive syncope and pre-syncope given preceding lightheadedness, brief duration of LOC and lack of postictal confusion."  cont IVFs prn in light of his gastric adenoca and ongoing chemo   has an appt with [facility] for IVFs tmr   check orthostatics  - SBP greater than 110   on dc resume home on toujeo and apidra  hyponatremia likely SIADH   had a fever today - sent for pancxs  - NTD   DC home off abx, recent finished a course of abx  cont home meds  avoid hydrochlorothiazide due to hyponatremia  cont synthroid     OTHER DETAILS:   7/7 - no cp palps sob abdo pain or dysuria     PHYSICAL EXAM:  T(F): 98.2 (07 Jul 2023 15:28), Max: 98.9 (07 Jul 2023 05:05)  HR: 98 (07 Jul 2023 15:28) (93 - 100)  BP: 128/56 (07 Jul 2023 15:28) (128/56 - 147/67)  RR: 18 (07 Jul 2023 15:28) (18 - 18)  SpO2: 98% (07 Jul 2023 15:28) (98% - 100%)/RA   GENERAL: NAD, able to lie flat in bed  HEAD:  Atraumatic, Normocephalic  EYES: EOMI, PERRLA, normal sclera  ENT: Moist mucous membranes  NECK: Supple, No JVD, no nuchal rigidity  CHEST/LUNG: Clear to auscultation bilaterally; No rales, rhonchi, wheezing, or rubs. Unlabored respirations  HEART: Regular rate and rhythm; No murmurs, rubs, or gallops  ABDOMEN: Bowel sounds present; Soft, Nontender, Nondistended. No hepatomegaly  EXTREMITIES:  no pitting bilaterally  NERVOUS SYSTEM:  Alert & Oriented X3, speech clear. No focal motor or sensory deficits  MSK: FROM all 4 extremities, full and equal strength  SKIN: No rashes or lesions    LABS: All Labs Reviewed:                    10.2   9.55  )-----------( 185      ( 07 Jul 2023 06:10 )             30.4  133<L>  |  104  |  10  ----------------------------<  247<H>  3.9   |  24  |  0.77    RADIOLOGY/EKG:  < from: CT Head No Cont (07.05.23 @ 12:49) >  IMPRESSION: No acute intracranial hemorrhage, mass effect, or shift of   the midline structures.    time spent on discharge - 50 mins

## 2023-07-08 LAB
CULTURE RESULTS: SIGNIFICANT CHANGE UP
SPECIMEN SOURCE: SIGNIFICANT CHANGE UP

## 2023-07-11 LAB
CULTURE RESULTS: SIGNIFICANT CHANGE UP
SPECIMEN SOURCE: SIGNIFICANT CHANGE UP

## 2023-07-12 DIAGNOSIS — E86.0 DEHYDRATION: ICD-10-CM

## 2023-07-12 DIAGNOSIS — E03.9 HYPOTHYROIDISM, UNSPECIFIED: ICD-10-CM

## 2023-07-12 DIAGNOSIS — R55 SYNCOPE AND COLLAPSE: ICD-10-CM

## 2023-07-12 DIAGNOSIS — Z79.4 LONG TERM (CURRENT) USE OF INSULIN: ICD-10-CM

## 2023-07-12 DIAGNOSIS — C16.9 MALIGNANT NEOPLASM OF STOMACH, UNSPECIFIED: ICD-10-CM

## 2023-07-12 DIAGNOSIS — Z79.890 HORMONE REPLACEMENT THERAPY: ICD-10-CM

## 2023-07-12 DIAGNOSIS — E22.2 SYNDROME OF INAPPROPRIATE SECRETION OF ANTIDIURETIC HORMONE: ICD-10-CM

## 2023-07-12 DIAGNOSIS — I10 ESSENTIAL (PRIMARY) HYPERTENSION: ICD-10-CM

## 2023-07-12 DIAGNOSIS — E78.5 HYPERLIPIDEMIA, UNSPECIFIED: ICD-10-CM

## 2023-07-18 ENCOUNTER — NON-APPOINTMENT (OUTPATIENT)
Age: 63
End: 2023-07-18

## 2023-07-18 ENCOUNTER — APPOINTMENT (OUTPATIENT)
Dept: CARDIOLOGY | Facility: CLINIC | Age: 63
End: 2023-07-18
Payer: COMMERCIAL

## 2023-07-18 VITALS
HEART RATE: 83 BPM | HEIGHT: 65 IN | SYSTOLIC BLOOD PRESSURE: 130 MMHG | WEIGHT: 168 LBS | BODY MASS INDEX: 27.99 KG/M2 | DIASTOLIC BLOOD PRESSURE: 70 MMHG | RESPIRATION RATE: 16 BRPM

## 2023-07-18 PROCEDURE — 93000 ELECTROCARDIOGRAM COMPLETE: CPT

## 2023-07-18 PROCEDURE — 99204 OFFICE O/P NEW MOD 45 MIN: CPT | Mod: 25

## 2023-07-18 RX ORDER — LOSARTAN POTASSIUM 100 MG/1
100 TABLET, FILM COATED ORAL
Refills: 0 | Status: DISCONTINUED | COMMUNITY
End: 2023-07-18

## 2023-07-18 RX ORDER — HYDROCHLOROTHIAZIDE 12.5 MG/1
12.5 CAPSULE ORAL
Refills: 0 | Status: DISCONTINUED | COMMUNITY
End: 2023-07-18

## 2023-07-18 RX ORDER — SIMVASTATIN 40 MG/1
40 TABLET, FILM COATED ORAL
Refills: 0 | Status: DISCONTINUED | COMMUNITY
End: 2023-07-18

## 2023-07-18 RX ORDER — MELOXICAM 7.5 MG/1
7.5 TABLET ORAL
Qty: 30 | Refills: 0 | Status: DISCONTINUED | COMMUNITY
Start: 2021-12-03 | End: 2023-07-18

## 2023-07-18 RX ORDER — INSULIN GLARGINE 300 U/ML
INJECTION, SOLUTION SUBCUTANEOUS
Refills: 0 | Status: DISCONTINUED | COMMUNITY
End: 2023-07-18

## 2023-07-18 RX ORDER — INSULIN GLULISINE 100 [IU]/ML
INJECTION, SOLUTION SUBCUTANEOUS
Refills: 0 | Status: DISCONTINUED | COMMUNITY
End: 2023-07-18

## 2023-07-18 RX ORDER — ASPIRIN 81 MG
81 TABLET, DELAYED RELEASE (ENTERIC COATED) ORAL
Refills: 0 | Status: DISCONTINUED | COMMUNITY
End: 2023-07-18

## 2023-08-09 ENCOUNTER — APPOINTMENT (OUTPATIENT)
Dept: NEUROLOGY | Facility: CLINIC | Age: 63
End: 2023-08-09
Payer: COMMERCIAL

## 2023-08-09 VITALS
HEART RATE: 73 BPM | SYSTOLIC BLOOD PRESSURE: 138 MMHG | DIASTOLIC BLOOD PRESSURE: 75 MMHG | BODY MASS INDEX: 27.16 KG/M2 | WEIGHT: 163 LBS | HEIGHT: 65 IN | TEMPERATURE: 98.2 F

## 2023-08-09 DIAGNOSIS — R55 SYNCOPE AND COLLAPSE: ICD-10-CM

## 2023-08-09 PROCEDURE — 99213 OFFICE O/P EST LOW 20 MIN: CPT

## 2023-08-09 RX ORDER — EZETIMIBE 10 MG/1
10 TABLET ORAL DAILY
Refills: 0 | Status: ACTIVE | COMMUNITY

## 2023-08-09 RX ORDER — ATORVASTATIN CALCIUM 80 MG/1
TABLET, FILM COATED ORAL
Refills: 0 | Status: ACTIVE | COMMUNITY

## 2023-08-09 RX ORDER — LEVOTHYROXINE SODIUM 137 UG/1
TABLET ORAL
Refills: 0 | Status: ACTIVE | COMMUNITY

## 2023-08-09 NOTE — CONSULT LETTER
[Dear  ___] : Dear  [unfilled], [Consult Letter:] : I had the pleasure of evaluating your patient, [unfilled]. [Please see my note below.] : Please see my note below. [Consult Closing:] : Thank you very much for allowing me to participate in the care of this patient.  If you have any questions, please do not hesitate to contact me. [FreeTextEntry3] : Sincerely,   Laila Joseph MD Diplomate, [employer] Academy of Psychiat[employer] and Neurology Board Certified in the Subspecialty of Clinical Neurophysiology Board Certified in the Subspecialty of Sleep Medicine Board Certified in the Subspecialty of Epilepsy [FreeTextEntry2] : Gordy Chang

## 2023-08-09 NOTE — DISCUSSION/SUMMARY
[FreeTextEntry1] : Mr. Sánchez is a 63 year old man with gastric cancer, diabetes, hypothyroidism. He was evaluated at [facility] after several episodes of blank stares, some followed by brief loss of consciousness. No episodes were followed by any prolonged confusion or accompanied by tongue bite or urinary incontinence. Episodes were suspected to be syncope. A routine EEG and a 24 hour EEG were unremarkable. He has not had any recurrence of episodes since hydration has been increased.   -Will hold off on anticonvulsants for now. -Continue to stay well hydrated. -Sit down/lie down if feeling dizzy. -Follow-up with oncology.  He will follow up as needed.

## 2023-08-09 NOTE — PHYSICAL EXAM
[FreeTextEntry1] : Examination: Constitutional: normal, no apparent distress Eyes: normal conjunctiva b/l, no ptosis, visual fields full Respiratory: no respiratory distress, normal effort, normal auscultation Cardiovascular: normal rate, rhythm, no murmurs Neck: supple, no masses Vascular: carotids normal Skin: normal color, no rashes Psych: normal mood, affect  Neurological: Memory: normal memory, oriented to person, place, time Language intact/no aphasia Cranial Nerves: II-XII intact, Pupils equally round and reactive to light, ocular muscles/movements intact, no ptosis, no facial weakness, tongue protrudes normally in the midline,  Motor: normal tone, no pronator drift, full strength in all four extremities in the proximal and distal muscle groups Coordination: Fine motor movements intact, rapid alternating movements intact, finger to nose intact bilaterally Sensory: intact to light touch DTRs: symmetric, normal Gait: narrow based, steady

## 2023-08-09 NOTE — HISTORY OF PRESENT ILLNESS
[FreeTextEntry1] : MR. Sánchez was previously seen at [facility]. He presented to [facility] on July 5, 2023.  He has a history of gastric cancer on chemotherapy, diabetes, hypothyroidism.  He presented with his staring spells.  On 6/25/23 he had lightheadedness and a fever.  He was taken by ambulance to [facility].  While his port was being accessed, his face turned red, he looked blank, he started to shake and he passed out.  Loss of consciousness lasted for a few seconds and was followed by a few seconds of confusion.  He had a similar episode after his first round of chemotherapy.  He had a CT and was admitted and discharged the next day.  On June 27 he went to [facility] for hydration.  While there he had loss of consciousness.  Reportedly he was shaking and staring.  He was brought back to the hospital, and treated with IV antibiotics for fever.  He was discharged on June 30.  He reportedly had fever again on July 1 and July 2, went back to the hospital and was again given antibiotics and discharged several hours later.  On July 5 he was at [facility] for hydration.  He had a blank stare made a gasping sound.  He did not pass out.  Shortly later he had another blank stare and did lose consciousness for a few seconds.  He was confused for few seconds when he regained consciousness. He reported that these events were preceded by feeling of lightheadedness.  No events were associated with tongue bite or urinary incontinence.  He had no prior history of seizures. He was admitted to [facility]. A routine EEG was normal. A 24-hour EEG did not show any evidence of epileptiform activity.  8/9/23: He had his fourth session of chemotherapy four weeks ago. He is scheduled for surgery to remove his tumor next week. He has not had any further episodes of loss of consciousness or near loss of consciousness since the hospitalization. He says that since post chemo hydration has been increased, he has been doing well. He says that he feels fantastic. He denies staring spells, olfactory hallucinations, rising epigastric sensation, waking up with tongue bites or urinary incontinence.

## 2023-08-16 NOTE — ED ADULT TRIAGE NOTE - MODE OF ARRIVAL
Pt LVM requesting Xarelto 20mg samples.       Set aside what we had available.       Called and informed pt of the above, he verbalized understanding.    [facility]

## 2023-08-28 ENCOUNTER — APPOINTMENT (OUTPATIENT)
Dept: CARDIOLOGY | Facility: CLINIC | Age: 63
End: 2023-08-28

## 2024-06-26 ENCOUNTER — NON-APPOINTMENT (OUTPATIENT)
Age: 64
End: 2024-06-26

## 2024-06-30 ENCOUNTER — NON-APPOINTMENT (OUTPATIENT)
Age: 64
End: 2024-06-30

## 2024-11-27 NOTE — ED CDU PROVIDER SUBSEQUENT DAY NOTE - MUSCULOSKELETAL, MLM
"Chief Complaint: Recurrent left inguinal hernia        History of Present Illness: Patient is a 57-year-old  male who is an employee of [employer] who presents the office today with a recurrent left inguinal hernia.  Patient apparently underwent left inguinal herniorrhaphy 5 years ago in the [place].  Over the past several months he has developed left groin discomfort and a \"bump \"in his left groin primarily when he stands.  Is also aggravated with walking.  He denies any GI component.  He was actually seen in August 2023 in regards to this hernia but was unable to proceed with surgery at that time.  He presents the office today with increasing pain and symptoms in regards to this.  The bulge in his left groin is getting larger and he would like to have his hernia repaired sooner rather than later.        Past Medical History:   Medical History        Past Medical History:   Diagnosis Date    Elevated PSA      Hepatic steatosis      Hepatomegaly      High cholesterol      Neck pain       takes robaxin prn               Past Surgical History:    Surgical History         Past Surgical History:   Procedure Laterality Date    HERNIA REPAIR        LA BX PROSTATE STRTCTC SATURATION SAMPLING IMG GID N/A 05/16/2023     Procedure: TRANSPERINEAL MRI FUSION BIOPSY PROSTATE;  Surgeon: Anna Traore MD;  Location: [facility];  Service: Urology    LA LAPS SURG [id number] RPBIC RAD W/NRV SPARING ROBOT N/A 07/10/2023     Procedure: ROBOTIC RADICAL PROSTATECTOMY, PELVIC LYMPH NODE DISSECTION;  Surgeon: Marcio Sosa MD;  Location: [facility];  Service: Urology    PROSTATE BIOPSY   08/2021     benign    PROSTATE SURGERY        VARICOSE VEIN SURGERY                   Allergies:    Allergies   No Known Allergies           Medications:    Current Medications   Current Outpatient Medications:     acetaminophen (TYLENOL) 500 mg tablet, Take 1,000 mg by mouth every 6 (six) hours as needed for mild pain, Disp: , Rfl:     miconazole 2 % " cream, , Disp: , Rfl:     omeprazole (PriLOSEC) 20 mg delayed release capsule, Take 1 capsule (20 mg total) by mouth daily as needed (acid reflux), Disp: 30 capsule, Rfl: 0    terbinafine (LamISIL) 250 mg tablet, , Disp: , Rfl:     triamcinolone (KENALOG) 0.1 % ointment, , Disp: , Rfl:     docusate sodium (COLACE) 100 mg capsule, Take 1 capsule (100 mg total) by mouth 2 (two) times a day for 14 days, Disp: 28 capsule, Rfl: 0    senna (SENOKOT) 8.6 mg, Take 1 tablet (8.6 mg total) by mouth daily at bedtime for 14 days (Patient not taking: Reported on 8/8/2023), Disp: 14 tablet, Rfl: 0    tadalafil (CIALIS) 20 MG tablet, Take 1 tablet (20 mg total) by mouth daily as needed for erectile dysfunction (Patient not taking: Reported on 8/8/2023), Disp: 10 tablet, Rfl: 5           Social History:    Social History            Social History           Substance and Sexual Activity   Alcohol Use Yes     Comment: socially      Social History          Substance and Sexual Activity   Drug Use Never      Tobacco Use History   Social History          Tobacco Use   Smoking Status Never   Smokeless Tobacco Never               Family History:    Family History         Family History   Problem Relation Age of Onset    Cancer Mother                 Review of Systems:    As per the HPI.  He does admit to some urinary incontinence primarily when he moves around.  No weight loss weight gain fever chills night sweats chest pain nausea vomit diarrhea constipation shortness of breath headaches blurry vision double vision sore throat chronic cough etc.     Vitals:      Vitals:     08/08/23 1311   BP: 118/68   Pulse: 67   Temp: 98 °F (36.7 °C)   SpO2: 97%         Physical Exam:  Healthy-appearing adult  male 5 foot 2 inches under 47 pounds.  He is awake alert no distress.     Vital signs as above    Skin warm dry  Head normocephalic and atraumatic  Eyes PERRLA EOM intact  Ears nose within normal limits active throat gag reflex  "intact  Neck no masses thigh megaly lymphadenopathy palpable.  Back no severe spinal tenderness  Lungs clear to A&P  Cor regular rhythm no murmurs carotid bruits  Abdomen: There is a visible bulge in his left groin.  He has laparoscopic incisions throughout the abdomen and periumbilical area.  The right groin demonstrates no evidence of a hernia.  Left groin shows a well-healed scar and a large hernia that is reducible primarily when the patient is a supine position.  He is no other abdominal masses or hernias noted.  Extremities negative CCE  Neurologically ANO x 3 cranial nerves II to XII intact  Lymphatics no lymphadenopathy palpable.     Lab Results: I have personally reviewed pertinent reports. See below.  Imaging: I have personally reviewed pertinent CT scan of the abdomen and pelvis  EKG, Pathology, and Other Studies: I have personally reviewed pertinent reports.            No visits with results within 1 Day(s) from this visit.   Latest known visit with results is:   Office Visit on 07/28/2023   Component Date Value    POST-VOID RESIDUAL VOLUM* 07/28/2023 0             Impression:  Recurrent left inguinal hernia and a patient status post robotic prostatectomy.  Becoming larger and more symptomatic.     Plan:  Schedule repair open left inguinal herniorrhaphy for recurrent left inguinal hernia with mesh at the earliest possible date.  Local sedation.              Instructions      Return in about 2 months (around 10/8/2023) for Recheck.  Additional Documentation    Vitals: /68 (BP Location: Left arm, Patient Position: Sitting, Cuff Size: Standard)     Pulse 67     Temp 98 °F (36.7 °C) (Tympanic)     Ht 5' 2\" (1.575 m)     Wt 66.7 kg (147 lb)     SpO2 97%     BMI 26.89 kg/m²     BSA 1.68 m²   SmartForms:  MELINDA PRE-CHARTING   Encounter Info: Billing Info,     Histor     " Spine appears normal, range of motion is not limited, no muscle or joint tenderness

## 2025-05-22 NOTE — ED PROVIDER NOTE - DATE/TIME 1
Medication:   Requested Prescriptions     Pending Prescriptions Disp Refills    gabapentin (NEURONTIN) 100 MG capsule [Pharmacy Med Name: GABAPENTIN 100MG CAPSULES] 120 capsule 0     Sig: Take 2 capsules by mouth 2 times daily.     Last Filled:  5.22.25 duplicate    Last appt: 2/11/2025   Next appt: Visit date not found    Last OARRS:        No data to display               27-Jun-2023 21:28